# Patient Record
Sex: MALE | Race: WHITE | Employment: OTHER | URBAN - METROPOLITAN AREA
[De-identification: names, ages, dates, MRNs, and addresses within clinical notes are randomized per-mention and may not be internally consistent; named-entity substitution may affect disease eponyms.]

---

## 2019-01-23 ENCOUNTER — HOSPITAL ENCOUNTER (INPATIENT)
Age: 64
LOS: 5 days | Discharge: HOME | DRG: 061 | End: 2019-01-28
Attending: EMERGENCY MEDICINE | Admitting: INTERNAL MEDICINE
Payer: SUBSIDIZED

## 2019-01-23 ENCOUNTER — APPOINTMENT (OUTPATIENT)
Dept: GENERAL RADIOLOGY | Age: 64
DRG: 061 | End: 2019-01-23
Attending: EMERGENCY MEDICINE
Payer: SUBSIDIZED

## 2019-01-23 ENCOUNTER — APPOINTMENT (OUTPATIENT)
Dept: CT IMAGING | Age: 64
DRG: 061 | End: 2019-01-23
Attending: EMERGENCY MEDICINE
Payer: SUBSIDIZED

## 2019-01-23 DIAGNOSIS — I63.9 CEREBROVASCULAR ACCIDENT (CVA), UNSPECIFIED MECHANISM (HCC): Primary | ICD-10-CM

## 2019-01-23 DIAGNOSIS — I10 ESSENTIAL HYPERTENSION: ICD-10-CM

## 2019-01-23 DIAGNOSIS — I50.9 CONGESTIVE HEART FAILURE, UNSPECIFIED HF CHRONICITY, UNSPECIFIED HEART FAILURE TYPE (HCC): ICD-10-CM

## 2019-01-23 DIAGNOSIS — I63.10 CEREBROVASCULAR ACCIDENT (CVA) DUE TO EMBOLISM OF PRECEREBRAL ARTERY (HCC): ICD-10-CM

## 2019-01-23 DIAGNOSIS — E78.5 HYPERLIPIDEMIA, UNSPECIFIED HYPERLIPIDEMIA TYPE: ICD-10-CM

## 2019-01-23 DIAGNOSIS — I25.10 ATHEROSCLEROSIS OF NATIVE CORONARY ARTERY OF NATIVE HEART WITHOUT ANGINA PECTORIS: ICD-10-CM

## 2019-01-23 LAB
ALBUMIN SERPL-MCNC: 3.6 G/DL (ref 3.5–5)
ALBUMIN/GLOB SERPL: 0.9 {RATIO} (ref 1.1–2.2)
ALP SERPL-CCNC: 65 U/L (ref 45–117)
ALT SERPL-CCNC: 69 U/L (ref 12–78)
ANION GAP SERPL CALC-SCNC: 12 MMOL/L (ref 5–15)
APTT PPP: 27.2 SEC (ref 22.1–32)
AST SERPL-CCNC: 63 U/L (ref 15–37)
ATRIAL RATE: 68 BPM
BASOPHILS # BLD: 0.1 K/UL (ref 0–0.1)
BASOPHILS NFR BLD: 1 % (ref 0–1)
BILIRUB SERPL-MCNC: 0.4 MG/DL (ref 0.2–1)
BUN SERPL-MCNC: 17 MG/DL (ref 6–20)
BUN/CREAT SERPL: 15 (ref 12–20)
CALCIUM SERPL-MCNC: 8.9 MG/DL (ref 8.5–10.1)
CALCULATED P AXIS, ECG09: 27 DEGREES
CALCULATED R AXIS, ECG10: -22 DEGREES
CALCULATED T AXIS, ECG11: 112 DEGREES
CHLORIDE SERPL-SCNC: 107 MMOL/L (ref 97–108)
CHOLEST SERPL-MCNC: 191 MG/DL
CK MB CFR SERPL CALC: 1.7 % (ref 0–2.5)
CK MB CFR SERPL CALC: 4.7 % (ref 0–2.5)
CK MB CFR SERPL CALC: 5.6 % (ref 0–2.5)
CK MB SERPL-MCNC: 25.9 NG/ML (ref 5–25)
CK MB SERPL-MCNC: 30.5 NG/ML (ref 5–25)
CK MB SERPL-MCNC: 8.4 NG/ML (ref 5–25)
CK SERPL-CCNC: 485 U/L (ref 39–308)
CK SERPL-CCNC: 543 U/L (ref 39–308)
CK SERPL-CCNC: 554 U/L (ref 39–308)
CO2 SERPL-SCNC: 24 MMOL/L (ref 21–32)
COMMENT, HOLDF: NORMAL
CREAT SERPL-MCNC: 1.1 MG/DL (ref 0.7–1.3)
CRP SERPL-MCNC: 0.41 MG/DL (ref 0–0.6)
DIAGNOSIS, 93000: NORMAL
DIFFERENTIAL METHOD BLD: ABNORMAL
EOSINOPHIL # BLD: 0.2 K/UL (ref 0–0.4)
EOSINOPHIL NFR BLD: 2 % (ref 0–7)
ERYTHROCYTE [DISTWIDTH] IN BLOOD BY AUTOMATED COUNT: 12.7 % (ref 11.5–14.5)
ERYTHROCYTE [SEDIMENTATION RATE] IN BLOOD: 18 MM/HR (ref 0–20)
EST. AVERAGE GLUCOSE BLD GHB EST-MCNC: 154 MG/DL
GLOBULIN SER CALC-MCNC: 4.1 G/DL (ref 2–4)
GLUCOSE BLD STRIP.AUTO-MCNC: 149 MG/DL (ref 65–100)
GLUCOSE SERPL-MCNC: 166 MG/DL (ref 65–100)
HBA1C MFR BLD: 7 % (ref 4.2–6.3)
HCT VFR BLD AUTO: 46.8 % (ref 36.6–50.3)
HDLC SERPL-MCNC: 52 MG/DL
HDLC SERPL: 3.7 {RATIO} (ref 0–5)
HGB BLD-MCNC: 15.3 G/DL (ref 12.1–17)
IMM GRANULOCYTES # BLD AUTO: 0 K/UL (ref 0–0.04)
IMM GRANULOCYTES NFR BLD AUTO: 0 % (ref 0–0.5)
LDLC SERPL CALC-MCNC: 125 MG/DL (ref 0–100)
LIPID PROFILE,FLP: ABNORMAL
LYMPHOCYTES # BLD: 2.7 K/UL (ref 0.8–3.5)
LYMPHOCYTES NFR BLD: 39 % (ref 12–49)
MAGNESIUM SERPL-MCNC: 2 MG/DL (ref 1.6–2.4)
MCH RBC QN AUTO: 33.5 PG (ref 26–34)
MCHC RBC AUTO-ENTMCNC: 32.7 G/DL (ref 30–36.5)
MCV RBC AUTO: 102.4 FL (ref 80–99)
MONOCYTES # BLD: 0.7 K/UL (ref 0–1)
MONOCYTES NFR BLD: 10 % (ref 5–13)
NEUTS SEG # BLD: 3.2 K/UL (ref 1.8–8)
NEUTS SEG NFR BLD: 48 % (ref 32–75)
NRBC # BLD: 0 K/UL (ref 0–0.01)
NRBC BLD-RTO: 0 PER 100 WBC
P-R INTERVAL, ECG05: 190 MS
PHOSPHATE SERPL-MCNC: 2.6 MG/DL (ref 2.6–4.7)
PLATELET # BLD AUTO: 204 K/UL (ref 150–400)
PMV BLD AUTO: 10.7 FL (ref 8.9–12.9)
POTASSIUM SERPL-SCNC: 3.7 MMOL/L (ref 3.5–5.1)
PROT SERPL-MCNC: 7.7 G/DL (ref 6.4–8.2)
Q-T INTERVAL, ECG07: 394 MS
QRS DURATION, ECG06: 88 MS
QTC CALCULATION (BEZET), ECG08: 418 MS
RBC # BLD AUTO: 4.57 M/UL (ref 4.1–5.7)
SAMPLES BEING HELD,HOLD: NORMAL
SERVICE CMNT-IMP: ABNORMAL
SODIUM SERPL-SCNC: 143 MMOL/L (ref 136–145)
THERAPEUTIC RANGE,PTTT: NORMAL SECS (ref 58–77)
TRIGL SERPL-MCNC: 70 MG/DL (ref ?–150)
TROPONIN I SERPL-MCNC: 0.59 NG/ML
TROPONIN I SERPL-MCNC: 12.3 NG/ML
TROPONIN I SERPL-MCNC: 16.3 NG/ML
TROPONIN I SERPL-MCNC: 8.1 NG/ML
TSH SERPL DL<=0.05 MIU/L-ACNC: 0.74 UIU/ML (ref 0.36–3.74)
VENTRICULAR RATE, ECG03: 68 BPM
VLDLC SERPL CALC-MCNC: 14 MG/DL
WBC # BLD AUTO: 6.8 K/UL (ref 4.1–11.1)

## 2019-01-23 PROCEDURE — 96361 HYDRATE IV INFUSION ADD-ON: CPT

## 2019-01-23 PROCEDURE — 74011000250 HC RX REV CODE- 250: Performed by: EMERGENCY MEDICINE

## 2019-01-23 PROCEDURE — 74011000258 HC RX REV CODE- 258: Performed by: RADIOLOGY

## 2019-01-23 PROCEDURE — 84100 ASSAY OF PHOSPHORUS: CPT

## 2019-01-23 PROCEDURE — 96374 THER/PROPH/DIAG INJ IV PUSH: CPT

## 2019-01-23 PROCEDURE — 36415 COLL VENOUS BLD VENIPUNCTURE: CPT

## 2019-01-23 PROCEDURE — 82962 GLUCOSE BLOOD TEST: CPT

## 2019-01-23 PROCEDURE — 94761 N-INVAS EAR/PLS OXIMETRY MLT: CPT

## 2019-01-23 PROCEDURE — 3E03317 INTRODUCTION OF OTHER THROMBOLYTIC INTO PERIPHERAL VEIN, PERCUTANEOUS APPROACH: ICD-10-PCS | Performed by: EMERGENCY MEDICINE

## 2019-01-23 PROCEDURE — 74011250637 HC RX REV CODE- 250/637: Performed by: INTERNAL MEDICINE

## 2019-01-23 PROCEDURE — 86140 C-REACTIVE PROTEIN: CPT

## 2019-01-23 PROCEDURE — 74011250637 HC RX REV CODE- 250/637: Performed by: PHYSICIAN ASSISTANT

## 2019-01-23 PROCEDURE — 77030032490 HC SLV COMPR SCD KNE COVD -B

## 2019-01-23 PROCEDURE — 80053 COMPREHEN METABOLIC PANEL: CPT

## 2019-01-23 PROCEDURE — 71045 X-RAY EXAM CHEST 1 VIEW: CPT

## 2019-01-23 PROCEDURE — 83036 HEMOGLOBIN GLYCOSYLATED A1C: CPT

## 2019-01-23 PROCEDURE — 82550 ASSAY OF CK (CPK): CPT

## 2019-01-23 PROCEDURE — 74011636320 HC RX REV CODE- 636/320: Performed by: RADIOLOGY

## 2019-01-23 PROCEDURE — 99285 EMERGENCY DEPT VISIT HI MDM: CPT

## 2019-01-23 PROCEDURE — 65610000006 HC RM INTENSIVE CARE

## 2019-01-23 PROCEDURE — C8929 TTE W OR WO FOL WCON,DOPPLER: HCPCS

## 2019-01-23 PROCEDURE — 92610 EVALUATE SWALLOWING FUNCTION: CPT | Performed by: SPEECH-LANGUAGE PATHOLOGIST

## 2019-01-23 PROCEDURE — 80061 LIPID PANEL: CPT

## 2019-01-23 PROCEDURE — 85025 COMPLETE CBC W/AUTO DIFF WBC: CPT

## 2019-01-23 PROCEDURE — 74011000258 HC RX REV CODE- 258: Performed by: EMERGENCY MEDICINE

## 2019-01-23 PROCEDURE — 74011250636 HC RX REV CODE- 250/636: Performed by: INTERNAL MEDICINE

## 2019-01-23 PROCEDURE — 84484 ASSAY OF TROPONIN QUANT: CPT

## 2019-01-23 PROCEDURE — 83735 ASSAY OF MAGNESIUM: CPT

## 2019-01-23 PROCEDURE — 70450 CT HEAD/BRAIN W/O DYE: CPT

## 2019-01-23 PROCEDURE — 96375 TX/PRO/DX INJ NEW DRUG ADDON: CPT

## 2019-01-23 PROCEDURE — 74011250636 HC RX REV CODE- 250/636

## 2019-01-23 PROCEDURE — 93005 ELECTROCARDIOGRAM TRACING: CPT

## 2019-01-23 PROCEDURE — 84443 ASSAY THYROID STIM HORMONE: CPT

## 2019-01-23 PROCEDURE — 74011250636 HC RX REV CODE- 250/636: Performed by: PHYSICIAN ASSISTANT

## 2019-01-23 PROCEDURE — 0042T CT PERF W CBF: CPT

## 2019-01-23 PROCEDURE — 70496 CT ANGIOGRAPHY HEAD: CPT

## 2019-01-23 PROCEDURE — 85730 THROMBOPLASTIN TIME PARTIAL: CPT

## 2019-01-23 PROCEDURE — 85652 RBC SED RATE AUTOMATED: CPT

## 2019-01-23 PROCEDURE — 74011250636 HC RX REV CODE- 250/636: Performed by: EMERGENCY MEDICINE

## 2019-01-23 RX ORDER — LISINOPRIL 5 MG/1
5 TABLET ORAL DAILY
Status: DISCONTINUED | OUTPATIENT
Start: 2019-01-23 | End: 2019-01-24

## 2019-01-23 RX ORDER — SODIUM CHLORIDE 9 MG/ML
75 INJECTION, SOLUTION INTRAVENOUS CONTINUOUS
Status: DISCONTINUED | OUTPATIENT
Start: 2019-01-23 | End: 2019-01-25

## 2019-01-23 RX ORDER — CARVEDILOL 6.25 MG/1
6.25 TABLET ORAL 2 TIMES DAILY WITH MEALS
Status: DISCONTINUED | OUTPATIENT
Start: 2019-01-23 | End: 2019-01-28 | Stop reason: HOSPADM

## 2019-01-23 RX ORDER — ONDANSETRON 2 MG/ML
4 INJECTION INTRAMUSCULAR; INTRAVENOUS
Status: DISCONTINUED | OUTPATIENT
Start: 2019-01-23 | End: 2019-01-28 | Stop reason: HOSPADM

## 2019-01-23 RX ORDER — FUROSEMIDE 10 MG/ML
40 INJECTION INTRAMUSCULAR; INTRAVENOUS ONCE
Status: COMPLETED | OUTPATIENT
Start: 2019-01-23 | End: 2019-01-23

## 2019-01-23 RX ORDER — SODIUM CHLORIDE 0.9 % (FLUSH) 0.9 %
30 SYRINGE (ML) INJECTION
Status: DISPENSED | OUTPATIENT
Start: 2019-01-23 | End: 2019-01-23

## 2019-01-23 RX ORDER — LABETALOL HCL 20 MG/4 ML
10 SYRINGE (ML) INTRAVENOUS
Status: DISCONTINUED | OUTPATIENT
Start: 2019-01-23 | End: 2019-01-23 | Stop reason: SDUPTHER

## 2019-01-23 RX ORDER — BISACODYL 5 MG
5 TABLET, DELAYED RELEASE (ENTERIC COATED) ORAL DAILY PRN
Status: DISCONTINUED | OUTPATIENT
Start: 2019-01-23 | End: 2019-01-28 | Stop reason: HOSPADM

## 2019-01-23 RX ORDER — SODIUM CHLORIDE 0.9 % (FLUSH) 0.9 %
5-40 SYRINGE (ML) INJECTION EVERY 8 HOURS
Status: DISCONTINUED | OUTPATIENT
Start: 2019-01-23 | End: 2019-01-28 | Stop reason: HOSPADM

## 2019-01-23 RX ORDER — SODIUM CHLORIDE 0.9 % (FLUSH) 0.9 %
5-40 SYRINGE (ML) INJECTION AS NEEDED
Status: DISCONTINUED | OUTPATIENT
Start: 2019-01-23 | End: 2019-01-28 | Stop reason: HOSPADM

## 2019-01-23 RX ORDER — ACETAMINOPHEN 325 MG/1
650 TABLET ORAL
Status: DISCONTINUED | OUTPATIENT
Start: 2019-01-23 | End: 2019-01-28 | Stop reason: HOSPADM

## 2019-01-23 RX ORDER — SODIUM CHLORIDE 0.9 % (FLUSH) 0.9 %
5-40 SYRINGE (ML) INJECTION EVERY 8 HOURS
Status: DISCONTINUED | OUTPATIENT
Start: 2019-01-23 | End: 2019-01-26

## 2019-01-23 RX ORDER — LABETALOL HCL 20 MG/4 ML
SYRINGE (ML) INTRAVENOUS
Status: DISPENSED
Start: 2019-01-23 | End: 2019-01-23

## 2019-01-23 RX ORDER — SODIUM CHLORIDE 0.9 % (FLUSH) 0.9 %
10 SYRINGE (ML) INJECTION
Status: COMPLETED | OUTPATIENT
Start: 2019-01-23 | End: 2019-01-23

## 2019-01-23 RX ORDER — LABETALOL HCL 20 MG/4 ML
20 SYRINGE (ML) INTRAVENOUS
Status: COMPLETED | OUTPATIENT
Start: 2019-01-23 | End: 2019-01-23

## 2019-01-23 RX ORDER — ATORVASTATIN CALCIUM 40 MG/1
40 TABLET, FILM COATED ORAL
Status: DISCONTINUED | OUTPATIENT
Start: 2019-01-23 | End: 2019-01-28 | Stop reason: HOSPADM

## 2019-01-23 RX ORDER — LABETALOL HCL 20 MG/4 ML
10 SYRINGE (ML) INTRAVENOUS
Status: DISCONTINUED | OUTPATIENT
Start: 2019-01-23 | End: 2019-01-24

## 2019-01-23 RX ORDER — SODIUM CHLORIDE 9 MG/ML
50 INJECTION, SOLUTION INTRAVENOUS ONCE
Status: COMPLETED | OUTPATIENT
Start: 2019-01-23 | End: 2019-01-23

## 2019-01-23 RX ADMIN — SODIUM CHLORIDE 75 ML/HR: 900 INJECTION, SOLUTION INTRAVENOUS at 17:30

## 2019-01-23 RX ADMIN — CARVEDILOL 6.25 MG: 6.25 TABLET, FILM COATED ORAL at 18:30

## 2019-01-23 RX ADMIN — LABETALOL 20 MG/4 ML (5 MG/ML) INTRAVENOUS SYRINGE 10 MG: at 09:33

## 2019-01-23 RX ADMIN — SODIUM CHLORIDE 1.5 ML: 9 INJECTION INTRAMUSCULAR; INTRAVENOUS; SUBCUTANEOUS at 12:41

## 2019-01-23 RX ADMIN — Medication 10 ML: at 18:32

## 2019-01-23 RX ADMIN — Medication 10 ML: at 08:18

## 2019-01-23 RX ADMIN — ALTEPLASE 81 MG: KIT at 02:00

## 2019-01-23 RX ADMIN — LABETALOL 20 MG/4 ML (5 MG/ML) INTRAVENOUS SYRINGE 20 MG: at 01:51

## 2019-01-23 RX ADMIN — IOPAMIDOL 120 ML: 755 INJECTION, SOLUTION INTRAVENOUS at 02:36

## 2019-01-23 RX ADMIN — SODIUM CHLORIDE 50 ML: 900 INJECTION, SOLUTION INTRAVENOUS at 03:00

## 2019-01-23 RX ADMIN — LISINOPRIL 5 MG: 5 TABLET ORAL at 10:26

## 2019-01-23 RX ADMIN — CARVEDILOL 6.25 MG: 6.25 TABLET, FILM COATED ORAL at 10:26

## 2019-01-23 RX ADMIN — Medication 10 ML: at 02:36

## 2019-01-23 RX ADMIN — FUROSEMIDE 40 MG: 10 INJECTION, SOLUTION INTRAMUSCULAR; INTRAVENOUS at 10:26

## 2019-01-23 RX ADMIN — SODIUM CHLORIDE 100 ML: 900 INJECTION, SOLUTION INTRAVENOUS at 02:36

## 2019-01-23 RX ADMIN — SODIUM CHLORIDE 75 ML/HR: 900 INJECTION, SOLUTION INTRAVENOUS at 04:16

## 2019-01-23 RX ADMIN — ATORVASTATIN CALCIUM 40 MG: 40 TABLET, FILM COATED ORAL at 21:59

## 2019-01-23 NOTE — ED NOTES
0500 provided pt with pillow for comfort. VSS. No s/s of acute distress or bleeding. 0530 spouse leaving for the night. Pt resting comfortably on stretcher. Call bell within reach. 0600 pt resting with eyes closed. Continues to be on monitor x 3. No s/s of bleeding noted. No sign and held orders available to release at this time. 0630 pt continues to rest with eyes closed. VSS.    0700 pt awake. Pt continues to have clear speech. IV on RIGHT Forearm has some bruising above IV sight and appears to have elevation at the site of insertion. IV fluids paused and switched to LEFT hand. IV checked by a 2nd RN. IV line has flashback and is flushing well. 0730 pt resting comfortably on stretcher. VSS. Call bell within reach. Will continue to monitor.

## 2019-01-23 NOTE — CONSULTS
Cardiology Consult Note      Patient Name: Fernandez Sebastian  : 1955 MRN: [id number]  Date: 2019  Time: 9:19 AM    Admit Diagnosis: CVA (cerebral vascular accident) [facility])    Primary/Consulting Cardiologist: Cally Hicks M.D.    Galdino Jordan for Consult: Elevated Troponin    Requesting MD: Deleta Moritz, MD    HPI:  Fernandez Sebastian is a 58 y.o. male admitted on 2019  for CVA (cerebral vascular accident) [facility]). has a past medical history of CAD (coronary artery disease). Presented from home early this am for left sided weakness. LKW at 2130 yesterday. Awoke to use the restroom and noted his left side was flaccid as well as left facial droop. Ultimately received tPA in the ED, which has helped his symptoms to mostly completely resolve. He has a h/o CAD. STEMI in  and received PCI to LAD with JARED. Has disease in OM1 and OM2 at 60% and RCA at 80% at the time of cath. He was put on ASA, Plavix, Statin, ACE and BB. EF by echo was 50% at that time. Last seen in office in , he admits he stopped following up. He no longer takes any medicines as well. He did initially lose weight, about 120 pounds, following his stents, but has gained since. Last weigh in office was 247 pounds. He denies having any CP or SOB with activities. He is a , moves heavy loads, walks a lot on the job as well as climbs ladders and step. He denies any edema of the legs, abdomen and does not have early satiety. Subjective:  Denies CP, SOB or palpitations. Feels he has complete recovery of his left side. Speech is clear. Assessment and Plan     1. Acute CVA   - s/p tPA   - Neurology consulted   - ASA held  2. Troponin elevation   - 0.59, Primary team trending   - Asymptomatic   - EKG NSR - no evidence for ACS  2.  CAD   - s/p PCI with JARED to LAD   - residual dz - disease in OM1 and OM@ at 60% and RCA at 80%   - No longer on ASA, Plavix, BB, ACE or statin - he stopped about 1 year after stents   - Statin restarted   - Restart BB and ACE   - ASA held with tPA use   - Echo ordered   - Lipid panel pending  3. HTN   - BP elevated in ED   - BB and ACE  4. Body mass index is 41.55 kg/m². - Morbid obesity - discussed weight loss and dietary changes  5. Abnormal CXR   - Cardiomegaly   - mild edema pattern   - edema of legs as well   - Give Lasix IV x 1    Acute CVA on presentation. tPA given and successfully stopped CVA. Left sided weakness and facial droop now absent. He feels back to \"normal\"  Troponin 0.59 and trending. H/o CAD with PCI to LAD and noted residual disease. Non compliant with meds. Echo ordered as well as lipid profile. Will start meds for HTN and follow up all other labs. Will follow up echo results. Further cardiac testing pending echo results. Cardiology attending: seen and examined. Agree with assess and plan  Has not seen doc since I saw him in 2012 and on no meds. No cardiac symptoms in spite of trop and ekg unchanged from 2012. Chest clear, cv rrr no murmur, ext 1+. Await echo results, but most likely will need cath to sort this out. Resume usual meds for htn and cad.         Review of Systems:     GENERAL   Recent weight loss - no   Fever -----------------   no   Chills -----------------   no     EYES, VISION   Visual Changes - no     EARS, NOSE, THROAT   Hearing loss ----------- no   Swallowing difficulties - no     CARDIOVASCULAR   Chest pain/pressure ---- no   Arrhythmia/palpitations - no       RESPIRATORY   Cough ------------------ no   Shortness of breath - no   Wheezing -------------- no   GASTROINTESTINAL   Abdominal pain - no   Heartburn -------- no   Bloody stool ----- no     GENITOURINARY   Frequent urination - no   Urgency -------------- no     MUSCULOSKELETAL   Joint pain/swelling ---- no   Musculoskeletal pain - no     SKIN & INTEGUMENTARY   Rashes - no   Sores --- no         NEUROLOGICAL Numbness/tingling - no   Sensation loss ------ no     PSYCHIATRIC   Nervousness/anxiety - no   Depression -------------- no     ENDOCRINE   Heat/cold intolerance - no   Excessive thirst -------- no     HEMATOLOGIC/LYMPHATIC   Abnormal bleeding - no     ALL/IMMUN   Allergic reaction ------ no   Recurrent infections - no     Previous treatment/evaluation includes Percutaneous Coronary Intervention, echocardiogram and cardiac catheterization . Cardiac risk factors: dyslipidemia, obesity, male gender, hypertension. Past Medical History:   Diagnosis Date    CAD (coronary artery disease)     stent, MI 4-10-12     Past Surgical History:   Procedure Laterality Date    CARDIAC SURG PROCEDURE UNLIST      cath with stents     Current Facility-Administered Medications   Medication Dose Route Frequency    sodium chloride (NS) flush 5-40 mL  5-40 mL IntraVENous Q8H    sodium chloride (NS) flush 5-40 mL  5-40 mL IntraVENous PRN    labetalol (NORMODYNE;TRANDATE) 20 mg/4 mL (5 mg/mL) injection 10 mg  10 mg IntraVENous Q10MIN PRN    niCARdipine (CARDENE) 25 mg in 0.9% sodium chloride 250 mL infusion  0-15 mg/hr IntraVENous TITRATE    0.9% sodium chloride infusion  75 mL/hr IntraVENous CONTINUOUS    sodium chloride (NS) flush 5-40 mL  5-40 mL IntraVENous Q8H    sodium chloride (NS) flush 5-40 mL  5-40 mL IntraVENous PRN    labetalol (NORMODYNE;TRANDATE) injection 10 mg  10 mg IntraVENous Q10MIN PRN    acetaminophen (TYLENOL) tablet 650 mg  650 mg Oral Q6H PRN    bisacodyl (DULCOLAX) tablet 5 mg  5 mg Oral DAILY PRN    atorvastatin (LIPITOR) tablet 40 mg  40 mg Oral QHS     No current outpatient medications on file. No Known Allergies   History reviewed. No pertinent family history.    Social History     Socioeconomic History    Marital status:      Spouse name: Not on file    Number of children: Not on file    Years of education: Not on file    Highest education level: Not on file   Tobacco Use    Smoking status: Never Smoker    Smokeless tobacco: Never Used   Substance and Sexual Activity    Alcohol use: Yes     Comment: Weekly    Drug use: No    Sexual activity: Yes       Objective:    Physical Exam    Vitals:   Vitals:    01/23/19 0730 01/23/19 0800 01/23/19 0830 01/23/19 0900   BP: 145/82 145/89 143/88 170/89   Pulse: 64 68 66 77   Resp: 16 16 16 18   Temp:  97.9 °F (36.6 °C) 97.9 °F (36.6 °C)    SpO2: 95% 98% 99% 99%   Weight:           General:    Alert, cooperative, no distress, appears stated age. Neck:   Supple, no carotid bruit and no JVD. Back:     Symmetric, normal curvature. Lungs:     Coarse BS to auscultation bilaterally. Heart[de-identified]    Regular rate and rhythm, S1, S2 normal, no murmur, click, rub or gallop. Abdomen:     Soft, non-tender. Bowel sounds normal.    Extremities:   Extremities normal, atraumatic, no cyanosis. 1 + edema. Vascular:   Pulses - 2+ radials   Skin:   Skin color normal. No rashes or lesions   Neurologic:   CN II-XII grossly intact. Telemetry: normal sinus rhythm    ECG:   EKG Results     Procedure 720 Value Units Date/Time    EKG, 12 LEAD, INITIAL [[id number]] Collected:  01/23/19 0255    Order Status:  Completed Updated:  01/23/19 0258     Ventricular Rate 68 BPM      Atrial Rate 68 BPM      P-R Interval 190 ms      QRS Duration 88 ms      Q-T Interval 394 ms      QTC Calculation (Bezet) 418 ms      Calculated P Axis 27 degrees      Calculated R Axis -22 degrees      Calculated T Axis 112 degrees      Diagnosis --     Normal sinus rhythm  Inferior infarct , age undetermined  Anterolateral infarct , age undetermined  When compared with ECG of 10-APR-2012 04:47,  Sinus rhythm has replaced Idioventricular rhythm  Vent.  rate has decreased BY  35 BPM              Data Review:     Radiology:   XR Results (most recent):  Results from Hospital Encounter encounter on 01/23/19   XR CHEST PORT    Narrative EXAM:  XR CHEST PORT    INDICATION: Chest pain    COMPARISON: 4/10/2012    TECHNIQUE: Portable AP semierect] chest view at 0237 hours    FINDINGS: Cardiac monitoring wires overlie the thorax. There is increased  moderate to marked cardiac silhouette enlargement. There is central pulmonary  vascular congestion     There are mild diffuse interstitial opacities. There is no pleural effusion or  pneumothorax. The bones and upper abdomen are stable. Impression IMPRESSION:    Increased moderate to marked cardiac silhouette enlargement, which may represent  cardiomegaly and/or pericardial effusion. Findings of pulmonary edema. Recent Labs     01/23/19  0140   *   TROIQ 0.59*     Recent Labs     01/23/19 0140      K 3.7      CO2 24   BUN 17   CREA 1.10   *   PHOS 2.6   CA 8.9     Recent Labs     01/23/19 0140   WBC 6.8   HGB 15.3   HCT 46.8        Recent Labs     01/23/19 0140   SGOT 63*   AP 65     No results for input(s): CHOL, LDLC in the last 72 hours. No lab exists for component: TGL, HDLC,  HBA1C  No results for input(s): CRP, TSH, TSHEXT in the last 72 hours. No lab exists for component: ESR    Mary Gonsalves. Blair Alaniz M.D.          [facility] [address] [place] [address] Rd[address] [facility] [address] [facility] [address]     [place] [address]     [phone]    CC:None

## 2019-01-23 NOTE — ED NOTES
Pt was able to stand at side of bed with minimal assistance and void in urinal. Pt returned to bed independently with no difficulties.

## 2019-01-23 NOTE — PROGRESS NOTES
NEUROINTERVENTIONAL SURGERY ATTENDING ON CALL NOTE    Asked by Dr. Vero Ramirez to review imaging on this 57 yo patient brought to the ED with sudden onset of left hemiplegia and facial droop early this morning. CT head was negative for blood or hyperdense MCA sign. Pt received tPA and returned to scanner for CTA/CTP. I personally reviewed all of the patient's neuroimaging. CT head shows no acute process, ASPECTS score of 10. Calcifications in both siphons and left vertebral artery. CTA shows no significant bifurcation disease. No large vessel occlusion identified. Reconstructed MIPs show a small right middle division branch occlusion with flow distally from collateral circulation. No other emboli identified. CTP shows delayed MTT in the middle division territory of the temporal lobe with no significant blood flow/blood volume mismatch. Pt was improving in the ED after tPA administration. No need for thrombectomy. Pt for routine post tPA care per neurology. Andrey Prieto M.D.   Attending, NIS

## 2019-01-23 NOTE — PROGRESS NOTES
Admission Medication Reconciliation:    Information obtained from:  patient    Comments/Recommendations:     Spoke with patient regarding Akilah Choi's medications. He states that he does not take any medications prior to admission. No changes have been made to the PTA medication list.    Allergies and reactions were verified with the patient. Allergies:  Patient has no known allergies. Chief Complaint for this Admission:    Chief Complaint   Patient presents with    Extremity Weakness       Significant PMH/Disease States:   Past Medical History:   Diagnosis Date    CAD (coronary artery disease)     stent, MI 4-10-12       Prior to Admission Medications:   None       Thank you for allowing me to participate in this patient's care. Please call the main pharmacy at  or the med [facility] pharmacist at  with any questions.     Keith Kenny, LeahD

## 2019-01-23 NOTE — PROGRESS NOTES
CM attempted to perform initial assessment. Pt currently using restroom. CM to follow-up with pt as able.     Rosanna Pak RN, BSN  Care Management Department

## 2019-01-23 NOTE — ED PROVIDER NOTES
58 y.o. male with past medical history significant for CAD who presents from home with chief complaint of left sided weakness. Pt states he went to bed x2130 last night, Last known well. He reports awaking from sleep x40 minutes ago to use the restroom when he noticed his left side was flaccid. He was able to walk to the bathroom and return to the bed, his wife then noticed a left sided facial droop noted. He denies any headache or pain at this time. Pt denies any hx of stroke. There are no other acute medical concerns at this time. PSHx: Significant for cardiac catheterization   Social Hx: denies tobacco use, denies EtOH use, denies Illicit Drug use    PCP: None    Note written by Barbara Steward, as dictated by Coleman Galvan MD 1:20 AM        The history is provided by the patient. No  was used. Past Medical History:   Diagnosis Date    CAD (coronary artery disease)     stent, MI 4-10-12       Past Surgical History:   Procedure Laterality Date    CARDIAC SURG PROCEDURE UNLIST      cath with stents         History reviewed. No pertinent family history.     Social History     Socioeconomic History    Marital status:      Spouse name: Not on file    Number of children: Not on file    Years of education: Not on file    Highest education level: Not on file   Social Needs    Financial resource strain: Not on file    Food insecurity - worry: Not on file    Food insecurity - inability: Not on file    Transportation needs - medical: Not on file   [employer] needs - non-medical: Not on file   Occupational History    Not on file   Tobacco Use    Smoking status: Never Smoker    Smokeless tobacco: Never Used   Substance and Sexual Activity    Alcohol use: Yes     Comment: Weekly    Drug use: No    Sexual activity: Yes   Other Topics Concern    Not on file   Social History Narrative    Not on file         ALLERGIES: Patient has no known allergies. Review of Systems   HENT:        + left facial droop   Musculoskeletal: Negative for myalgias. Neurological: Positive for weakness (L sided). Negative for headaches. All other systems reviewed and are negative. Vitals:    01/23/19 0141 01/23/19 0153 01/23/19 0158 01/23/19 0200   BP: 185/81 178/74 178/74 160/82   Pulse: 97 89 92 91   Resp: 17 23 26   SpO2: 93% 90% 93% 92%   Weight:                Physical Exam   Nursing note and vitals reviewed. CONSTITUTIONAL: Well-appearing; well-nourished; in mild distress  HEAD: Normocephalic; atraumatic  EYES: PERRL; EOM intact; conjunctiva and sclera are clear bilaterally. ENT: No rhinorrhea; normal pharynx with no tonsillar hypertrophy; mucous membranes pink/moist, no erythema, no exudate. NECK: Supple; non-tender; no cervical lymphadenopathy  CARD: Normal S1, S2; no murmurs, rubs, or gallops. Regular rate and rhythm. RESP: Normal respiratory effort; breath sounds clear and equal bilaterally; no wheezes, rhonchi, or rales. ABD: Normal bowel sounds; non-distended; non-tender; no palpable organomegaly, no masses, no bruits. Back Exam: Normal inspection; no vertebral point tenderness, no CVA tenderness. Normal range of motion. EXT: Normal ROM in all four extremities; non-tender to palpation; no swelling or deformity; distal pulses are normal, no edema. SKIN: Warm; dry; no rash. NEURO:Alert and oriented x 3, Excessive aphasia with left facial droop; left upper and lower extremity weakness; decreased reflexes on the left side; left pronator drift; gaze deviation to the right side     MDM  Number of Diagnoses or Management Options  Cerebrovascular accident (CVA), unspecified mechanism [facility]):   Diagnosis management comments: Assessment: 77-year-old male, who presents with acute CVA and VAN positive for large vessel occlusion. The patient appears mild to moderately hypertensive. He is in the window for thrombolytic therapy. The [facility] is 12.      Plan: CT scan of the head, followed by CTA of the head and neck and CT perfusion/ consult Teleneurology/ thrombolytic therapy/ Consult NIS/ consult hospitalist/ Monitor and Reevaluate. Amount and/or Complexity of Data Reviewed  Clinical lab tests: ordered and reviewed  Tests in the radiology section of CPT®: ordered and reviewed  Tests in the medicine section of CPT®: reviewed and ordered  Discussion of test results with the performing providers: yes  Decide to obtain previous medical records or to obtain history from someone other than the patient: yes  Obtain history from someone other than the patient: yes  Review and summarize past medical records: yes  Discuss the patient with other providers: yes  Independent visualization of images, tracings, or specimens: yes    Risk of Complications, Morbidity, and/or Mortality  Presenting problems: moderate  Diagnostic procedures: moderate  Management options: moderate    Critical Care  Total time providing critical care: (Total critical care time spent exclusive of procedures: 60 minutes)    Patient Progress  Patient progress: stable         Procedures     ED EKG interpretation:  Rhythm: normal sinus rhythm; and regular . Rate (approx.): 68; Axis: left axis deviation; P wave: normal; QRS interval: normal ; ST/T wave: non-specific changes; in  Lead: Diffusely; Other findings: abnormal ekg. This EKG was interpreted by Surjit Barraza MD,ED Provider. XRAY INTERPRETATION (ED MD)  Chest Xray  No acute process seen. Normal heart size. No bony abnormalities. No infiltrate. Tita Hanley MD 4:07 AM      PROGRESS NOTE:  1:22 AM  Pt in Ct. PROGRESS NOTE:  1:26 AM  Paged neurology. CONSULT NOTE:  1:30 AM Tita Hanley MD spoke with Dr. Anjali Diggs, Consult for Neurology. Discussed available diagnostic tests and clinical findings. Dr. Anjali Diggs will evaluate. PROGRESS NOTE:  1:50 AM  Dr. Anjali Diggs evaluating the pt.     CONSULT NOTE:  2:20 AM Tita Hanley MD spoke with Dr. Luis Cannon, Consult for NIS. Discussed available diagnostic tests and clinical findings. Dr. Luis Cannon will review results of the pt's CTA. PROGRESS NOTE:    Pt has been reexamined by Ankita Bowman MD all available results have been reviewed with pt and any available family. The patient is not a candidate for an embolectomy. His symptoms are rapidly improving after the administration of TPA. Will consult hospitalist for evaluation and admission. Pt understands sx, dx, and tx in ED. Care plan has been outlined and questions have been answered. Pt and any available family understands and agrees to need for admission to hospital for further tx not available in ED. Pt is ready for admission. Written by Alphonso Triplett MD,  3:09 AM    .   Hospitalist Ray for Admission  3:09 AM    ED Room Number: DE68/97  Patient Name and age:  Gen Escoto 58 y.o.  male  Working Diagnosis:   1. Cerebrovascular accident (CVA), unspecified mechanism ([address]      Readmission: no  Isolation Requirements:  no  Recommended Level of Care:  ICU  Code Status:  Full  Other:  Pt was given TPA. CONSULT NOTE:  Ankita Bowman MD spoke with Dr. Letitia Friend of the adult hospitalist team. Discussed patient's presentation, history, physical assessment, and available diagnostic results.  He will evaluate, write orders and admit the patient to the hospital. 3:15 AM

## 2019-01-23 NOTE — PROGRESS NOTES
A 79-year-old man with a past medical history significant for coronary artery disease status post stent placement in 2012, hypertension, dyslipidemia, morbid obesity was admitted for sudden onset of left-sided facial droop. Acute CVA s/p tPA  - MRI pending  - neurology consult    Elevated troponin   - Trop increased from 0.59 to 16.30  - pt denied chest pain  - cardiology on board  - discussed with Dr. Corinna Cartwright- possible cardiac cath tomorrow.   - echo pending

## 2019-01-23 NOTE — ED NOTES
Bedside and Verbal shift change report given to Naveed Luna RN (oncoming nurse) by Lyubov Mendoza RN (offgoing nurse). Report included the following information SBAR, ED Summary, MAR and Recent Results.

## 2019-01-23 NOTE — ED TRIAGE NOTES
Gertr[place] 2130, woke from sleep to use restroom, noted LEFT sided facial droop, then while attempting to walk to restroom, became unable to move LEFT arm or leg.

## 2019-01-23 NOTE — ED NOTES
Pt tolerated dysphagia screening well    Spouse requesting lab/imaging results. Dr. Shailesh Oates notified.

## 2019-01-23 NOTE — PROGRESS NOTES
Speech Pathology bedside swallow evaluation/discharge  Patient: Ashly Davies (4 y.o. male)  Date: 1/23/2019  Primary Diagnosis: CVA (cerebral vascular accident) [facility])       Precautions:        ASSESSMENT :  Based on the objective data described below, the patient presents with intact oropharyngeal swallow. Patient with mild left facial droop however no functional speech or swallowing deficits. Patient tolerated all PO trials without overt s/s aspiration. Patient speaking intelligibly at full sentence level in conversation. .  Skilled acute therapy provided by a speech-language pathologist is not indicated at this time. PLAN :  Recommendations:  Regular diet  Thin liquids  No follow up SLP treatment warranted  Discharge Recommendations: None     SUBJECTIVE:   Patient stated I have a swallowing problem. I swallow too much. Patient reports no difficulty swallowing. RN approved visit. OBJECTIVE:     Past Medical History:   Diagnosis Date    CAD (coronary artery disease)     stent, MI 4-10-12     Past Surgical History:   Procedure Laterality Date    CARDIAC SURG PROCEDURE UNLIST      cath with stents     Prior Level of Function/Home Situation:      Diet prior to admission: Regular  Current Diet:  NPO   Cognitive and Communication Status:  Neurologic State: Alert  Orientation Level: Oriented X4  Cognition: Appropriate for age attention/concentration, Follows commands  Perception: Appears intact  Perseveration: No perseveration noted  Safety/Judgement: Awareness of environment, Insight into deficits  Oral Assessment:  Oral Assessment  Labial: Left droop  Dentition: Intact  Oral Hygiene: Moist oral mucosa  Lingual: No impairment  Velum: Unable to visualize  Mandible: No impairment  P.O. Trials:  Patient Position: Upright in bed  Vocal quality prior to P.O.: No impairment  Consistency Presented: Ice chips; Thin liquid;Puree; Solid  How Presented: Self-fed/presented;Straw;Spoon; Successive swallows Bolus Acceptance: No impairment  Bolus Formation/Control: No impairment     Propulsion: No impairment  Oral Residue: None  Initiation of Swallow: No impairment  Laryngeal Elevation: Functional  Aspiration Signs/Symptoms: None  Pharyngeal Phase Characteristics: No impairment, issues, or problems              Oral Phase Severity: No impairment  Pharyngeal Phase Severity : No impairment  NOMS:   The NOMS functional outcome measure was used to quantify this patient's level of swallowing impairment. Based on the NOMS, the patient was determined to be at level 7 for swallow function     NOMS Swallowing Levels:  Level 1 (CN): NPO  Level 2 (CM): NPO but takes consistency in therapy  Level 3 (CL): Takes less than 50% of nutrition p.o. and continues with nonoral feedings; and/or safe with mod cues; and/or max diet restriction  Level 4 (CK): Safe swallow but needs mod cues; and/or mod diet restriction; and/or still requires some nonoral feeding/supplements  Level 5 (CJ): Safe swallow with min diet restriction; and/or needs min cues  Level 6 (CI): Independent with p.o.; rare cues; usually self cues; may need to avoid some foods or needs extra time  Level 7 ([address] [place] [address]): Independent for all p.o.  GARO. (2003). National Outcomes Measurement System (NOMS): Adult Speech-Language Pathology User's Guide. Pain:  Pain Scale 1: Numeric (0 - 10)  Pain Intensity 1: 0     After treatment:   [] Patient left in no apparent distress sitting up in chair  [x] Patient left in no apparent distress in bed  [x] Call bell left within reach  [x] Nursing notified  [x] Caregiver present  [] Bed alarm activated    COMMUNICATION/EDUCATION:   The patients plan of care including findings, recommendations, and recommended diet changes were discussed with: Registered Nurse. Patient was educated regarding role of SLP, POC. [] Posted safety precautions in patient's room.   [x] Patient/family have participated as able and agree with findings and recommendations. [] Patient is unable to participate in plan of care at this time.     Thank you for this referral.  Мария Pardo, SLP  Time Calculation: 15 mins

## 2019-01-23 NOTE — PROGRESS NOTES
Physical Therapy:    Orders received and chart reviewed. Pt admitted with acute CVA and received TPA in the ER this morning. Will hold therapy until 24 hours post TPA administration prior to mobilization. Will follow up tomorrow.  Thank you    Bird Becerra, PT, DPT

## 2019-01-23 NOTE — ED NOTES
0141 Pt back in ED room. Dr. Ivonne Newby, teleneurology, examining pt. [id number] noted by spouse. Pharmacy at the bedside. [id number] teleneurology and Dr. Moy Lowry speaking with pt and spouse regarding alteplase administration. Pt placed on 2L via NC due to OSats dropping to 89% on RA. OSats 94% on 2L O2    0159 Alteplase bolus given. 0200 Alteplase drip started. 0215 Pt to CT for CTA  Via stretcher with 2 RNs and alteplase infusing. No s/s of bleeding noted. Pt tolerating infusion well. 0245 speech appears to be a little bit more clearer at this time. Pt is able to raise left arm a little higher as well. Pt requesting ice chips. Pt provided with mouth swab for oral care. Spouse continues to be at the bedside. 0300 Alteplase infusion complete. NS running at 81 ml/hr to clear alteplase. Pt tolerated infusion well. RN remained a the bedside throughout the infusion.

## 2019-01-23 NOTE — PROGRESS NOTES
Occupational Therapy [id number] -   01.23.2019     Orders received and chart reviewed. Patient admitted with acute CVA and received TPA in the ER this morning (~0200AM). Will hold therapy until 24 hours post TPA administration prior to mobilization. Will follow up tomorrow. Thank you. Dakota Katz MS, OTR/L

## 2019-01-23 NOTE — PROGRESS NOTES
TRANSFER - IN REPORT:    Verbal report received from guillermo(name) on [employer]  being received from er(unit) for routine progression of care      Report consisted of patients Situation, Background, Assessment and   Recommendations(SBAR). Information from the following report(s) SBAR, Kardex and MAR was reviewed with the receiving nurse. Opportunity for questions and clarification was provided. Assessment completed upon patients arrival to unit and care assumed.

## 2019-01-23 NOTE — ED NOTES
0327 Pt will not be going to IR as he is not a candidate per Dr. Natividad Riggs, who reviewed CTA imaging. Dr. Tatiana Matos had called for an update and was notified of this. Maninder Anglin at the bedside. Maintenance fluids hung. No s/s of bleeding noted. 0430 pt talking with spouse. Pt does not appear to be in any distress at this time.

## 2019-01-23 NOTE — H&P
[address] HISTORY AND PHYSICAL Boris Drummond 
MR#: [id number] : 1955 ACCOUNT #: [de-identified] ADMIT DATE: 2019 PRIMARY CARE PHYSICIAN:  None. SOURCE OF INFORMATION:  The patient and the patient's spouse, who was present at the bedside. CHIEF COMPLAINT:  Left-sided facial droop. HISTORY OF PRESENT ILLNESS:  This is a 59-year-old man with a past medical history significant for coronary artery disease status post stent placement, hypertension, dyslipidemia, morbid obesity who was in his usual state of health until the day of presentation at the emergency room when the patient developed sudden onset of left-sided facial droop. It was reported that the patient was last known well at about 9:30 yesterday evening. The patient went to bed around that time. Forty minutes later, the patient woke up to use the bathroom and realized that the left side of his body was weak. He called his wife on the phone who was in another room sleeping, and the wife came to the room and found that the patient has left facial droop in addition to the left-sided weakness. Patient was brought to the emergency room for further evaluation. When the patient arrived at the emergency room, a code stroke was called. CT of the head without contrast was obtained. The emergency room physician consulted neurologist through the tele neurology service. CTA of the head and neck with a perfusion study was obtained. It was determined that the patient was a candidate for tPA. Patient received tPA in the emergency room. The left facial droop improved as well as the left-sided weakness. Patient was referred to the hospitalist service for evaluation for admission. He was last admitted to this hospital from 04/10/2012 to 2012. He was admitted to the cardiology service for evaluation and treatment of anterior myocardial infarction.   He underwent cardiac catheterization with subsequent LAD stent placement. The patient was discharged home on cardiac medication. The patient stated that he took the medication for a while, but after some time, the medications were interfering with his ability to go to work and stopped the medication and tried alternative natural remedies. He also stated that he has lost a significant amount of weight through lifestyle modification. The patient stopped the medication over the objection of his primary care provider at that time. The patient denies fever, rigors, and chills. His troponin was elevated on arrival at the emergency room, but the patient denies any chest pain. PAST MEDICAL HISTORY:  Coronary artery disease status post stent placement, morbid obesity, hypertension, dyslipidemia. ALLERGIES:  NO KNOWN DRUG ALLERGIES. MEDICATIONS:  The patient is not taking any medication at present, but in 2012, he was discharged home on the following medications:  Aspirin 81 mg daily, Plavix 75 mg daily, lisinopril 5 mg daily, Zocor 40 mg daily, Lopressor 25 mg twice daily. FAMILY HISTORY:  This was reviewed. The patient's mother has Alzheimer's. No family history of stroke or TIA. PAST SURGICAL HISTORY:  This is significant for cardiac stent placement. SOCIAL HISTORY:  No history of tobacco abuse. Patient admits to social consumption of alcohol. REVIEW OF SYSTEMS:   
HEAD, EYES, EARS, NOSE, AND THROAT:  No headache, no dizziness, no blurring of vision, no photophobia. RESPIRATORY SYSTEM:  No cough, no shortness of breath, no hemoptysis. CARDIOVASCULAR SYSTEM:  No chest pain, no orthopnea, no palpitation. GASTROINTESTINAL SYSTEM:  No nausea and vomiting, no diarrhea, no constipation. GENITOURINARY SYSTEM:  No dysuria, no urgency, and no frequency. All other systems are reviewed, and they are negative. PHYSICAL EXAMINATION:   
GENERAL APPEARANCE:  The patient appeared ill, in moderate distress. VITAL SIGNS:  On arrival at the emergency room, temperature 98.2, pulse 97, respiratory rate 17, blood pressure 185/81, oxygen saturation 93% on room air. HEAD:  Normocephalic, atraumatic. EYES:  Normal eye movement. No redness, no drainage, no discharge. EARS:  Normal external ears with no obvious drainage. NOSE:  No deformity and no drainage. MOUTH AND THROAT:  No visible oral lesion. NECK:  Supple. No JVD, no thyromegaly. CHEST:  Clear breath sounds. No wheezing, no crackles. HEART:  Normal S1 and S2.  Regular. No clinically appreciable murmur. ABDOMEN:  Soft, obese, nontender, normal bowel sounds. CENTRAL NERVOUS SYSTEM:  Alert, oriented x3. Mild left facial droop noted, but patient is able to move all extremities. EXTREMITIES:  No edema. Pulses 2+ bilaterally. MUSCULOSKELETAL SYSTEM:  No obvious joint deformity and swelling. SKIN:  No active skin lesions seen in the exposed part of the body. PSYCHIATRIC:  Normal mood and affect. LYMPHATIC SYSTEM:  No cervical lymphadenopathy. DIAGNOSTIC DATA:  DATA:  Chest x-ray shows increased moderate to marked cardiac enlargement which may represent cardiomegaly and/or pericardial effusion and findings of pulmonary edema. CT scan of the head without contrast:  No acute intracranial abnormalities. CTA of the head and neck with the perfusion study shows a probable occlusion of a distal branch of the right MCA associated with a small area of abnormal perfusion in the right parietotemporal region. LABORATORY DATA:  Hematology:  WBC 6.8, hemoglobin at 15.3, hematocrit 46.8, platelet at 502. Coagulation profile:  PTT 27.2. Phosphorus level 2.6, magnesium 2.0.   Chemistry:  Sodium 143, potassium 3.7, chloride at 107, CO2 of 24, glucose 166, BUN 17, creatinine 1.10, calcium 8.9, total bilirubin 0.4, ALT 69, AST 63, alkaline phosphatase 65, total protein at 7.7, albumin level 3.6, globulin at 4.1. Cardiac profile:  Troponin 0.59. CK-MB 8.4. ASSESSMENT:   
1. Acute cerebrovascular accident. 2.  Coronary artery disease status post stent placement. 3.  Morbid obesity, unspecified. 4.  Hypertension. 5.  Dyslipidemia. 6.  Elevated troponin level. 7.  Hyperglycemia. PLAN:   
1. Acute cerebrovascular accident. Will admit the patient for further evaluation and treatment. Patient received tPA in the emergency room. Will admit the patient into the intensive care unit. Will obtain the MRI of the brain, echocardiogram.  Inpatient neurology consult will be requested to assist in further evaluation and treatment. Will check a lipid profile, hemoglobin A1c level. Will request a PT, OT evaluation and treatment as well as a speech therapy evaluation and treatment. Will await further recommendation from the inpatient neurologist.  Aspirin on hold for 24 hours as per post-tPA treatment protocol. 2.  Coronary artery disease status post stent placement. Will obtain serial cardiac markers. Patient is chest pain free. Will start the patient on Lopressor and lisinopril. Aspirin is on hold because of the tPA administration. 3.  Morbid obesity, unspecified. Dietary consult will be requested for dietary counseling. Will check a TSH level. 4.  Hypertension. Will place the patient on lisinopril and Lopressor as stated above. 5.  Dyslipidemia. Will resume Zocor and check lipid profile. 6.  Elevated troponin level. Patient denies chest pain. Will trend troponin level. Cardiology consult will be requested to assist in further evaluation and treatment. Will also await result of echocardiogram.   
7.  Hyperglycemia. Will check hemoglobin A1c level. Patient denies a history of diabetes. OTHER ISSUES:  CODE STATUS:  PATIENT IS A FULL CODE. Will request SCD for DVT prophylaxis.  
 
 
Mike Funez MD 
 
  
RE/SN 
 D: 01/23/2019 05:06    
T: 01/23/2019 06:07 JOB #: [id number]

## 2019-01-24 ENCOUNTER — APPOINTMENT (OUTPATIENT)
Dept: MRI IMAGING | Age: 64
DRG: 061 | End: 2019-01-24
Attending: INTERNAL MEDICINE
Payer: SUBSIDIZED

## 2019-01-24 LAB
ANION GAP SERPL CALC-SCNC: 8 MMOL/L (ref 5–15)
APTT PPP: 26.7 SEC (ref 22.1–32)
BUN SERPL-MCNC: 17 MG/DL (ref 6–20)
BUN/CREAT SERPL: 18 (ref 12–20)
CALCIUM SERPL-MCNC: 8.6 MG/DL (ref 8.5–10.1)
CHLORIDE SERPL-SCNC: 106 MMOL/L (ref 97–108)
CO2 SERPL-SCNC: 27 MMOL/L (ref 21–32)
CREAT SERPL-MCNC: 0.95 MG/DL (ref 0.7–1.3)
ERYTHROCYTE [DISTWIDTH] IN BLOOD BY AUTOMATED COUNT: 13.2 % (ref 11.5–14.5)
ERYTHROCYTE [DISTWIDTH] IN BLOOD BY AUTOMATED COUNT: 13.2 % (ref 11.5–14.5)
GLUCOSE SERPL-MCNC: 151 MG/DL (ref 65–100)
HCT VFR BLD AUTO: 45.7 % (ref 36.6–50.3)
HCT VFR BLD AUTO: 46.8 % (ref 36.6–50.3)
HGB BLD-MCNC: 14.8 G/DL (ref 12.1–17)
HGB BLD-MCNC: 15 G/DL (ref 12.1–17)
INR PPP: 1.1 (ref 0.9–1.1)
MCH RBC QN AUTO: 33.3 PG (ref 26–34)
MCH RBC QN AUTO: 33.7 PG (ref 26–34)
MCHC RBC AUTO-ENTMCNC: 32.1 G/DL (ref 30–36.5)
MCHC RBC AUTO-ENTMCNC: 32.4 G/DL (ref 30–36.5)
MCV RBC AUTO: 103.8 FL (ref 80–99)
MCV RBC AUTO: 104.1 FL (ref 80–99)
NRBC # BLD: 0 K/UL (ref 0–0.01)
NRBC # BLD: 0 K/UL (ref 0–0.01)
NRBC BLD-RTO: 0 PER 100 WBC
NRBC BLD-RTO: 0 PER 100 WBC
PLATELET # BLD AUTO: 180 K/UL (ref 150–400)
PLATELET # BLD AUTO: 192 K/UL (ref 150–400)
PMV BLD AUTO: 10.7 FL (ref 8.9–12.9)
PMV BLD AUTO: 11 FL (ref 8.9–12.9)
POTASSIUM SERPL-SCNC: 4 MMOL/L (ref 3.5–5.1)
PROTHROMBIN TIME: 10.9 SEC (ref 9–11.1)
RBC # BLD AUTO: 4.39 M/UL (ref 4.1–5.7)
RBC # BLD AUTO: 4.51 M/UL (ref 4.1–5.7)
SODIUM SERPL-SCNC: 141 MMOL/L (ref 136–145)
THERAPEUTIC RANGE,PTTT: NORMAL SECS (ref 58–77)
TROPONIN I SERPL-MCNC: 4.17 NG/ML
WBC # BLD AUTO: 7.1 K/UL (ref 4.1–11.1)
WBC # BLD AUTO: 7.5 K/UL (ref 4.1–11.1)

## 2019-01-24 PROCEDURE — 77010033678 HC OXYGEN DAILY

## 2019-01-24 PROCEDURE — C1760 CLOSURE DEV, VASC: HCPCS

## 2019-01-24 PROCEDURE — 97165 OT EVAL LOW COMPLEX 30 MIN: CPT

## 2019-01-24 PROCEDURE — 85610 PROTHROMBIN TIME: CPT

## 2019-01-24 PROCEDURE — 36415 COLL VENOUS BLD VENIPUNCTURE: CPT

## 2019-01-24 PROCEDURE — 84484 ASSAY OF TROPONIN QUANT: CPT

## 2019-01-24 PROCEDURE — 99153 MOD SED SAME PHYS/QHP EA: CPT

## 2019-01-24 PROCEDURE — 4A023N7 MEASUREMENT OF CARDIAC SAMPLING AND PRESSURE, LEFT HEART, PERCUTANEOUS APPROACH: ICD-10-PCS | Performed by: SPECIALIST

## 2019-01-24 PROCEDURE — 74011250636 HC RX REV CODE- 250/636: Performed by: INTERNAL MEDICINE

## 2019-01-24 PROCEDURE — 80048 BASIC METABOLIC PNL TOTAL CA: CPT

## 2019-01-24 PROCEDURE — 70551 MRI BRAIN STEM W/O DYE: CPT

## 2019-01-24 PROCEDURE — 97530 THERAPEUTIC ACTIVITIES: CPT

## 2019-01-24 PROCEDURE — 93458 L HRT ARTERY/VENTRICLE ANGIO: CPT

## 2019-01-24 PROCEDURE — 85027 COMPLETE CBC AUTOMATED: CPT

## 2019-01-24 PROCEDURE — B2151ZZ FLUOROSCOPY OF LEFT HEART USING LOW OSMOLAR CONTRAST: ICD-10-PCS | Performed by: SPECIALIST

## 2019-01-24 PROCEDURE — 65660000000 HC RM CCU STEPDOWN

## 2019-01-24 PROCEDURE — 74011636320 HC RX REV CODE- 636/320: Performed by: SPECIALIST

## 2019-01-24 PROCEDURE — 99152 MOD SED SAME PHYS/QHP 5/>YRS: CPT

## 2019-01-24 PROCEDURE — 74011250637 HC RX REV CODE- 250/637: Performed by: INTERNAL MEDICINE

## 2019-01-24 PROCEDURE — 74011250637 HC RX REV CODE- 250/637: Performed by: PHYSICIAN ASSISTANT

## 2019-01-24 PROCEDURE — 74011250636 HC RX REV CODE- 250/636: Performed by: SPECIALIST

## 2019-01-24 PROCEDURE — 77030013744

## 2019-01-24 PROCEDURE — 85730 THROMBOPLASTIN TIME PARTIAL: CPT

## 2019-01-24 PROCEDURE — 77030004533 HC CATH ANGI DX IMP BSC -B

## 2019-01-24 PROCEDURE — 51798 US URINE CAPACITY MEASURE: CPT

## 2019-01-24 PROCEDURE — 97116 GAIT TRAINING THERAPY: CPT

## 2019-01-24 PROCEDURE — 97161 PT EVAL LOW COMPLEX 20 MIN: CPT

## 2019-01-24 PROCEDURE — C1894 INTRO/SHEATH, NON-LASER: HCPCS

## 2019-01-24 PROCEDURE — 74011250637 HC RX REV CODE- 250/637: Performed by: SPECIALIST

## 2019-01-24 PROCEDURE — B2111ZZ FLUOROSCOPY OF MULTIPLE CORONARY ARTERIES USING LOW OSMOLAR CONTRAST: ICD-10-PCS | Performed by: SPECIALIST

## 2019-01-24 RX ORDER — SODIUM CHLORIDE 0.9 % (FLUSH) 0.9 %
5-40 SYRINGE (ML) INJECTION EVERY 8 HOURS
Status: CANCELLED | OUTPATIENT
Start: 2019-01-24

## 2019-01-24 RX ORDER — SODIUM CHLORIDE 0.9 % (FLUSH) 0.9 %
5 SYRINGE (ML) INJECTION AS NEEDED
Status: DISCONTINUED | OUTPATIENT
Start: 2019-01-24 | End: 2019-01-24

## 2019-01-24 RX ORDER — SODIUM CHLORIDE 9 MG/ML
3 INJECTION, SOLUTION INTRAVENOUS AS NEEDED
Status: DISCONTINUED | OUTPATIENT
Start: 2019-01-24 | End: 2019-01-24

## 2019-01-24 RX ORDER — SODIUM CHLORIDE 9 MG/ML
1.5 INJECTION, SOLUTION INTRAVENOUS AS NEEDED
Status: DISCONTINUED | OUTPATIENT
Start: 2019-01-24 | End: 2019-01-24

## 2019-01-24 RX ORDER — MIDAZOLAM HYDROCHLORIDE 1 MG/ML
1-10 INJECTION, SOLUTION INTRAMUSCULAR; INTRAVENOUS
Status: DISCONTINUED | OUTPATIENT
Start: 2019-01-24 | End: 2019-01-24

## 2019-01-24 RX ORDER — VERAPAMIL HYDROCHLORIDE 2.5 MG/ML
2.5-5 INJECTION, SOLUTION INTRAVENOUS
Status: DISCONTINUED | OUTPATIENT
Start: 2019-01-24 | End: 2019-01-24

## 2019-01-24 RX ORDER — GUAIFENESIN 100 MG/5ML
81 LIQUID (ML) ORAL DAILY
Status: DISCONTINUED | OUTPATIENT
Start: 2019-01-24 | End: 2019-01-28 | Stop reason: HOSPADM

## 2019-01-24 RX ORDER — ATROPINE SULFATE 0.1 MG/ML
.5-1 INJECTION INTRAVENOUS AS NEEDED
Status: DISCONTINUED | OUTPATIENT
Start: 2019-01-24 | End: 2019-01-24

## 2019-01-24 RX ORDER — FUROSEMIDE 10 MG/ML
40 INJECTION INTRAMUSCULAR; INTRAVENOUS ONCE
Status: COMPLETED | OUTPATIENT
Start: 2019-01-24 | End: 2019-01-24

## 2019-01-24 RX ORDER — FUROSEMIDE 40 MG/1
40 TABLET ORAL DAILY
Status: DISCONTINUED | OUTPATIENT
Start: 2019-01-25 | End: 2019-01-24

## 2019-01-24 RX ORDER — SODIUM CHLORIDE 0.9 % (FLUSH) 0.9 %
5-40 SYRINGE (ML) INJECTION AS NEEDED
Status: CANCELLED | OUTPATIENT
Start: 2019-01-24

## 2019-01-24 RX ORDER — LISINOPRIL 10 MG/1
10 TABLET ORAL DAILY
Status: DISCONTINUED | OUTPATIENT
Start: 2019-01-25 | End: 2019-01-24

## 2019-01-24 RX ORDER — FENTANYL CITRATE 50 UG/ML
25-200 INJECTION, SOLUTION INTRAMUSCULAR; INTRAVENOUS
Status: DISCONTINUED | OUTPATIENT
Start: 2019-01-24 | End: 2019-01-24

## 2019-01-24 RX ORDER — HEPARIN SODIUM 200 [USP'U]/100ML
1000 INJECTION, SOLUTION INTRAVENOUS AS NEEDED
Status: DISCONTINUED | OUTPATIENT
Start: 2019-01-24 | End: 2019-01-24

## 2019-01-24 RX ORDER — HEPARIN SODIUM 1000 [USP'U]/ML
1000-10000 INJECTION, SOLUTION INTRAVENOUS; SUBCUTANEOUS AS NEEDED
Status: DISCONTINUED | OUTPATIENT
Start: 2019-01-24 | End: 2019-01-24

## 2019-01-24 RX ORDER — CLOPIDOGREL BISULFATE 75 MG/1
75 TABLET ORAL DAILY
Status: DISCONTINUED | OUTPATIENT
Start: 2019-01-25 | End: 2019-01-28 | Stop reason: HOSPADM

## 2019-01-24 RX ORDER — SPIRONOLACTONE 25 MG/1
25 TABLET ORAL DAILY
Status: DISCONTINUED | OUTPATIENT
Start: 2019-01-25 | End: 2019-01-24

## 2019-01-24 RX ORDER — SODIUM CHLORIDE 9 MG/ML
25 INJECTION, SOLUTION INTRAVENOUS CONTINUOUS
Status: DISCONTINUED | OUTPATIENT
Start: 2019-01-24 | End: 2019-01-24 | Stop reason: HOSPADM

## 2019-01-24 RX ORDER — HYDRALAZINE HYDROCHLORIDE 20 MG/ML
10 INJECTION INTRAMUSCULAR; INTRAVENOUS
Status: DISCONTINUED | OUTPATIENT
Start: 2019-01-24 | End: 2019-01-28 | Stop reason: HOSPADM

## 2019-01-24 RX ORDER — CLOPIDOGREL 300 MG/1
600 TABLET, FILM COATED ORAL
Status: DISCONTINUED | OUTPATIENT
Start: 2019-01-24 | End: 2019-01-24

## 2019-01-24 RX ORDER — LIDOCAINE HYDROCHLORIDE 10 MG/ML
10-30 INJECTION INFILTRATION; PERINEURAL
Status: DISCONTINUED | OUTPATIENT
Start: 2019-01-24 | End: 2019-01-24

## 2019-01-24 RX ORDER — FUROSEMIDE 10 MG/ML
40 INJECTION INTRAMUSCULAR; INTRAVENOUS ONCE
Status: ACTIVE | OUTPATIENT
Start: 2019-01-24 | End: 2019-01-25

## 2019-01-24 RX ADMIN — ATORVASTATIN CALCIUM 40 MG: 40 TABLET, FILM COATED ORAL at 21:20

## 2019-01-24 RX ADMIN — Medication 10 ML: at 16:28

## 2019-01-24 RX ADMIN — CARVEDILOL 6.25 MG: 6.25 TABLET, FILM COATED ORAL at 18:15

## 2019-01-24 RX ADMIN — Medication 10 ML: at 00:01

## 2019-01-24 RX ADMIN — SODIUM CHLORIDE 75 ML/HR: 900 INJECTION, SOLUTION INTRAVENOUS at 08:58

## 2019-01-24 RX ADMIN — CARVEDILOL 6.25 MG: 6.25 TABLET, FILM COATED ORAL at 08:49

## 2019-01-24 RX ADMIN — SODIUM CHLORIDE 25 ML/HR: 900 INJECTION, SOLUTION INTRAVENOUS at 13:18

## 2019-01-24 RX ADMIN — IOPAMIDOL 104 ML: 755 INJECTION, SOLUTION INTRAVENOUS at 13:56

## 2019-01-24 RX ADMIN — Medication 10 ML: at 21:21

## 2019-01-24 RX ADMIN — HEPARIN SODIUM 2000 UNITS: 200 INJECTION, SOLUTION INTRAVENOUS at 13:06

## 2019-01-24 RX ADMIN — LISINOPRIL 5 MG: 5 TABLET ORAL at 08:49

## 2019-01-24 RX ADMIN — SODIUM CHLORIDE 250 ML: 900 INJECTION, SOLUTION INTRAVENOUS at 13:37

## 2019-01-24 RX ADMIN — LIDOCAINE HYDROCHLORIDE 10 ML: 10 INJECTION, SOLUTION INFILTRATION; PERINEURAL at 13:32

## 2019-01-24 RX ADMIN — FUROSEMIDE 40 MG: 10 INJECTION, SOLUTION INTRAMUSCULAR; INTRAVENOUS at 13:59

## 2019-01-24 RX ADMIN — ASPIRIN 81 MG CHEWABLE TABLET 81 MG: 81 TABLET CHEWABLE at 08:49

## 2019-01-24 NOTE — CONSULTS
Neurology Consult Note     NAME: Ivon Lugo   :  1955   MRN:  [id number]   DATE:  2019    Assessment:     Principal Problem:    CVA (cerebral vascular accident) ([address] (2019)      Pt is a 55yo male with HTN, HLD, morbid obesity, CAD s/p MI and stenting in , non-compliant with therapy, admitted 18 with acute left F/A/L weakness s/p IV TPA. No LVO. Pt had improvement in sxs. Not on antiplatelet agents at home. Echo with EF 20%, he had Troponin elevation to 16, but then trending down to 12, no c/o CP. Cardiology took him for cardiac cath today. He has not had 24 hour post-TPA imaging yet. Cardiology has started him on ASA. 81mg daily and have placed order for Plavix to start tomorrow. Cardiology has also started him on Lasix, Lisinopril, Spironolactone, Coreg. His BP was as low as 94/58 overnight.   HgbA1C 7.0  CT head - no acute intracranial abnormality. CTA H/N - Atherosclerotic calcification origin dominant left vertebral artery with associated stenosis, chronic calcification carotid bifurcations, Abrupt occlusion distal right MCA M3 branches corresponding to area of perfusion deficit. Plan:   -Continue ASA 81mg  -MRI brain ASAP  -Continue Lipitor 40mg daily  -Diabetes management per hospitalist  -I recommend permissive hypertension, I recommend treating SBP >160 at this point.   -I d/w cardiology [place], [place] and then Dr. Smiley Cruz. We are trying to balance tx for CHF, CAD, and acute stroke. Agree with dual antiplatelet ASA or Plavix per cardiac guidelines. Will d/c Lasix, Spironolactone, and Lisinopril and continue only Coreg at current dose. These medications can be restarted per CHF guidelines in one week. Subjective:   Pt doing well. No complaints. No residual deficits from CVA.      Objective:   Chart reviewed since last seen    Current Facility-Administered Medications   Medication Dose Route Frequency    aspirin chewable tablet 81 mg  81 mg Oral DAILY    [START ON 2019] lisinopril (PRINIVIL, ZESTRIL) tablet 10 mg  10 mg Oral DAILY    [START ON 2019] clopidogrel (PLAVIX) tablet 75 mg  75 mg Oral DAILY    [START ON 2019] spironolactone (ALDACTONE) tablet 25 mg  25 mg Oral DAILY    furosemide (LASIX) injection 40 mg  40 mg IntraVENous ONCE    [START ON 2019] furosemide (LASIX) tablet 40 mg  40 mg Oral DAILY    sodium chloride (NS) flush 5-40 mL  5-40 mL IntraVENous Q8H    sodium chloride (NS) flush 5-40 mL  5-40 mL IntraVENous PRN    niCARdipine (CARDENE) 25 mg in 0.9% sodium chloride 250 mL infusion  0-15 mg/hr IntraVENous TITRATE    0.9% sodium chloride infusion  75 mL/hr IntraVENous CONTINUOUS    sodium chloride (NS) flush 5-40 mL  5-40 mL IntraVENous Q8H    sodium chloride (NS) flush 5-40 mL  5-40 mL IntraVENous PRN    labetalol (NORMODYNE;TRANDATE) 20 mg/4 mL (5 mg/mL) injection 10 mg  10 mg IntraVENous Q10MIN PRN    acetaminophen (TYLENOL) tablet 650 mg  650 mg Oral Q6H PRN    bisacodyl (DULCOLAX) tablet 5 mg  5 mg Oral DAILY PRN    atorvastatin (LIPITOR) tablet 40 mg  40 mg Oral QHS    carvedilol (COREG) tablet 6.25 mg  6.25 mg Oral BID WITH MEALS    ondansetron (ZOFRAN) injection 4 mg  4 mg IntraVENous Q6H PRN     Home Meds: None    No Known Allergies    SH: , No Tob, +ETOH once a week, 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 Cardiac: Regular rate and rhythm with no murmurs. Extremities: 2+ Radial pulses, no cyanosis or edema    Neurological Exam:  Mental Status: Oriented to time, place and person. Speech and language intact. Attention and fund of knowledge appropriate. Normal recent and remote memory. Cranial Nerves:   VFF, PERRL, EOMI, no nystagmus, no ptosis. Facial sensation is normal. Facial movement is symmetric. Palate is midline. Tongue is midline. Hearing is intact bilaterally. Motor:  5/5 strength in upper and lower proximal and distal muscles. Normal bulk and tone. No PD. No tremors   Reflexes:   Deep tendon reflexes 2+ and symmetric. Toes downgoing. Sensory:   Intact to LT and PP   Gait:  Gait not assessed due to post-cath   Cerebellar:  Intact FTN and HTS, ERICA intact         Lab Review   Recent Results (from the past 24 hour(s))   TROPONIN I    Collection Time: 01/23/19  7:07 PM   Result Value Ref Range    Troponin-I, Qt. 8.10 (H) <0.05 ng/mL   CBC W/O DIFF    Collection Time: 01/24/19  2:01 AM   Result Value Ref Range    WBC 7.1 4.1 - 11.1 K/uL    RBC 4.39 4. 10 - 5.70 M/uL    HGB 14.8 12.1 - 17.0 g/dL    HCT 45.7 36.6 - 50.3 %    .1 (H) 80.0 - 99.0 FL    MCH 33.7 26.0 - 34.0 PG    MCHC 32.4 30.0 - 36.5 g/dL    RDW 13.2 11.5 - 14.5 %    PLATELET 029 661 - 675 K/uL    MPV 11.0 8.9 - 12.9 FL    NRBC 0.0 0  WBC    ABSOLUTE NRBC 0.00 0.00 - 7.83 K/uL   METABOLIC PANEL, BASIC    Collection Time: 01/24/19  2:01 AM   Result Value Ref Range    Sodium 141 136 - 145 mmol/L    Potassium 4.0 3.5 - 5.1 mmol/L    Chloride 106 97 - 108 mmol/L    CO2 27 21 - 32 mmol/L    Anion gap 8 5 - 15 mmol/L    Glucose 151 (H) 65 - 100 mg/dL    BUN 17 6 - 20 MG/DL    Creatinine 0.95 0.70 - 1.30 MG/DL    BUN/Creatinine ratio 18 12 - 20      GFR est AA >60 >60 ml/min/1.73m2    GFR est non-AA >60 >60 ml/min/1.73m2    Calcium 8.6 8.5 - 10.1 MG/DL   TROPONIN I Collection Time: 01/24/19  2:01 AM   Result Value Ref Range    Troponin-I, Qt. 4.17 (H) <0.05 ng/mL   CBC W/O DIFF    Collection Time: 01/24/19 12:30 PM   Result Value Ref Range    WBC 7.5 4.1 - 11.1 K/uL    RBC 4.51 4.10 - 5.70 M/uL    HGB 15.0 12.1 - 17.0 g/dL    HCT 46.8 36.6 - 50.3 %    .8 (H) 80.0 - 99.0 FL    MCH 33.3 26.0 - 34.0 PG    MCHC 32.1 30.0 - 36.5 g/dL    RDW 13.2 11.5 - 14.5 %    PLATELET 002 320 - 878 K/uL    MPV 10.7 8.9 - 12.9 FL    NRBC 0.0 0  WBC    ABSOLUTE NRBC 0.00 0.00 - 0.01 K/uL   PTT    Collection Time: 01/24/19 12:30 PM   Result Value Ref Range    aPTT 26.7 22.1 - 32.0 sec    aPTT, therapeutic range     58.0 - 77.0 SECS   PROTHROMBIN TIME + INR    Collection Time: 01/24/19 12:30 PM   Result Value Ref Range    INR 1.1 0.9 - 1.1      Prothrombin time 10.9 9.0 - 11.1 sec       Additional comments:  I have reviewed the patient's new clinical lab test results. I have personally reviewed the patient's radiographs. CT Results (most recent):  Results from Hospital Encounter encounter on 01/23/19   CT PERF W CBF    Narrative *PRELIMINARY REPORT*  There is probable occlusion of a distal branch of the right MCA associated with  a small area of abnormal perfusion in the right parietotemporal region. The  right vertebral artery is developmentally diminutive in its course and  terminates in PICA. An attempt was made to discuss these findings with the emergency department on  1/23/2019 at 0245 hours by Dr. Merced Sheets but the patient was not reached until 0305  hours. [phone]   Final report to follow. *END PRELIMINARY REPORT*    *FINAL REPORT BELOW*  EXAM:  CTA CODE NEURO HEAD AND NECK W CONT, CT PERF W CBF  INDICATION:  CVA  TECHNIQUE: Axial spiral acquired CT angiography was performed from the aortic  arch up through the intracranial vessels. This was performed during intravenous  bolus infusion 100 mL of Isovue 370.  Post-processing with  MIP reconstructions  from aortic arch to the skull base. CT dose reduction was achieved through use  of a standardized protocol tailored for this examination and automatic exposure  control for dose modulation. COMPARISON: CT head, CT perfusion  FINDINGS:  Normal origins of the brachiocephalic arteries from the arch. Some  atherosclerotic calcification in the aorta. The left vertebral artery is dominant. The right vertebral artery is relatively  small. There is dense calcification associated with the origin of the left  vertebral artery which may be associated with a significant stenosis. Cervical vertebral arteries are adequately maintained. Atherosclerotic  calcification related to be 4 segments of both vertebral arteries with at least  mild stenosis. Basilar artery is normal and supplies both posterior cerebral  arteries. Common carotid arteries are adequately maintained. Atherosclerotic calcification in both carotid bifurcations without hemodynamic  stenosis. 0% stenosis by NASCET criteria. Tortuosity of the distal cervical  internal carotid arteries with associated calcification and no significant  stenosis. Carotid siphons also have atherosclerotic calcification with relatively mild  stenosis. Symmetric opacification of middle and anterior cerebral arteries. There is an abrupt occlusion of the right middle cerebral artery distal M3  branch corresponding to the area perfusion abnormality in the right lateral  frontal lobe. Impression IMPRESSION:  1. Atherosclerotic calcification origin dominant left vertebral artery with  associated stenosis which may be significant but hard to define due to degree of  calcification and tortuosity. 2. CHRONIC calcification carotid bifurcations without hemodynamically  significant stenosis. 3. Abrupt occlusion distal right middle cerebral artery M3 branches  corresponding to area of perfusion deficit. EXAM:  CTA CODE NEURO HEAD AND NECK W CONT, CT PERF W CBF  INDICATION:  Acute left-sided facial droop.  Left arm and leg weakness. PROCEDURE:  During rapid bolus infusion 40 mL Isovue-370 CT perfusion acquisition through  the mid calvarium was obtained with color coded mapping reconstructions of time  to peak, blood volume, blood flow, mean transit time and  T-max . CT dose  reduction was achieved through use of a standardized protocol tailored for this  examination and automatic exposure control for dose modulation. COMPARISON: CT  FINDINGS:  There is an area of diminished perfusion in the right lateral frontal lobe  operculum. Possible less defined area of reduced perfusion extending  posteriorly. Final result (Exam End: 1/23/2019 01:29) Provider Status: Open   Study Result     EXAM:  CT CODE NEURO HEAD WO CONTRAST     INDICATION: Weakness.     COMPARISON: None.     TECHNIQUE: Axial noncontrast head CT from foramen magnum to vertex. Coronal and  sagittal reformatted images were obtained. CT dose reduction was achieved  through use of a standardized protocol tailored for this examination and  automatic exposure control for dose modulation. Adaptive statistical iterative  reconstruction (ASIR) was utilized.     FINDINGS:  There is diffuse age-related parenchymal volume loss. The ventricles  and sulci are age-appropriate without hydrocephalus. There is no mass effect or  midline shift. There is no intracranial hemorrhage or extra-axial fluid  collection. There is no significant white matter disease. The gray-white matter  differentiation is maintained. The basal cisterns are patent.     The osseous structures are intact. There is mild mucosal thickening in the left  maxillary sinus. The remaining visualized paranasal sinuses and mastoid air  cells are clear.     IMPRESSION:   No acute intracranial abnormality. Care Plan discussed with:  Patient x   Family x   RN x   Care Manager    Consultant/Specialist:  x     Signed: Jessica Guajardo MD

## 2019-01-24 NOTE — PROGRESS NOTES
Problem: Pressure Injury - Risk of  Goal: *Prevention of pressure injury  Document Patrice Scale and appropriate interventions in the flowsheet. Outcome: Progressing Towards Goal  Pressure Injury Interventions: Activity Interventions: Increase time out of bed, Pressure redistribution bed/mattress(bed type)    Mobility Interventions: Assess need for specialty bed, HOB 30 degrees or less, Pressure redistribution bed/mattress (bed type)    Nutrition Interventions: Document food/fluid/supplement intake                    Problem: Falls - Risk of  Goal: *Absence of Falls  Document Hunter Fall Risk and appropriate interventions in the flowsheet.   Outcome: Progressing Towards Goal  Fall Risk Interventions:  Mobility Interventions: Communicate number of staff needed for ambulation/transfer, Patient to call before getting OOB         Medication Interventions: Patient to call before getting OOB    Elimination Interventions: Call light in reach

## 2019-01-24 NOTE — PROGRESS NOTES
Reason for Admission:   Presented to the ED with left sided weakness and left facial droop. RRAT Score:   12                  Plan for utilizing home health: TBD                         Likelihood of Readmission:  moderate                         Transition of Care Plan:  Rehab    Patient presented to the ED with left facial droop and left sided weakness. Code stroke called, patient received TPA. Care manager met with patient and his wife to explain role and discuss transitions of care. Patient is independent no previous home health or equipment needs. Patient confirmed he does not have a PCP and is not interested in getting one, he will follow up with his neurologist and cardiologist at discharge. He uses the [facility] on [address] as his pharmacy. Confirmed with patient he is currently uninsured. [facility] met with patient this morning and provided him with a financial aid application. Patient is currently in the Cath Lab as his troponin has been elevated. Consult for rehab noted. Will await therapy notes to determine plan for discharge.  Keenan Ruvalcaba RN,CRM    Care Management Interventions  Palliative Care Criteria Met (RRAT>21 & CHF Dx)?: No  MyChart Signup: No  Discharge Durable Medical Equipment: No  Physical Therapy Consult: Yes  Occupational Therapy Consult: Yes  Speech Therapy Consult: Yes  Current Support Network: Lives with Spouse(wife Indio Sawyer [phone])  Confirm Follow Up Transport: Family

## 2019-01-24 NOTE — PROGRESS NOTES
Neurology Consult                                                       Subjective:      Romina Valladares  is a 58 y.o.  male with a  Medical history of CAD, Hypertension, dyslipidemia, morbid obesity s/p stents placed in 2012 and during that time he was put on ASA, Plavix, Statin, ACE and Beta blockers . Had an ejection fracture of 50% also in 2012. It is noted that patient stopped following up with cardiology and is no longer taking any of his cardiac meds due to its interference  with his ability to work as a  . He  was admitted on 01/24/2018 due to a complaint of acute left sided weakness to include his (L) UE and (R) LE  He went to bed that night around 2130 and 40 minutes later got up to use restroom. He noticed at that time his left upper and lower extremity was weak and  when he got back into bed that is when his wife noticed that he also had some left sided facial droop present as well. .  CT head: completed revealed No acute intracranial abnormality. CTA head and neck reveals: Atherosclerotic calcification origin dominant left vertebral artery with associated stenosis, chronic calcification carotid bifurcations, Abrupt occlusion distal right middle cerebral artery M3 branches corresponding to area of perfusion deficit. Patient was provided TPA in the Emergency room and his symptoms were improving;   Neuro IR was consulted and images reviewed by Dr. Anthony Kyle and found the  patient was not a candidate for embolectomy. He was then admitted to the Neuro ICU for further care. Neurology was consulted due to srtoke symptoms that were occurring.      Past Medical History:   Diagnosis Date    Arthritis     BMI 40.0-44.9, adult ([facility] [address] 03/20/2018    CAD (coronary artery disease)     Depression     Diabetes (HCC)     GERD (gastroesophageal reflux disease)     Headache(784.0)     Hx of carbuncle of skin and subcutaneous tissue 03/20/2018    Hyperlipidemia     Hypertension     Morbid obesity ([address] 03/20/2018    Psychiatric disorder     Depressioin, anxiety     Past Surgical History:   Procedure Laterality Date    HX GYN      c section    HX HEENT      tonsillectomy    HX ORTHOPAEDIC      lumbar spine surgery    HX ORTHOPAEDIC      bilat knee arthroscopy    HX ORTHOPAEDIC      cervical fusion    HX ORTHOPAEDIC      carpal tunnel surgery      Family History   Problem Relation Age of Onset    Cancer Maternal Aunt     Diabetes Brother     Heart Disease Maternal Grandmother      Social History     Tobacco Use    Smoking status: Never Smoker    Smokeless tobacco: Never Used   Substance Use Topics    Alcohol use: No      Current Facility-Administered Medications   Medication Dose Route Frequency Provider Last Rate Last Dose    midazolam (VERSED) 1 mg/mL injection             propofol (DIPRIVAN) infusion  0-50 mcg/kg/min IntraVENous TITRATE Carla Loaiza MD 32 mL/hr at 01/12/19 2130 50 mcg/kg/min at 01/12/19 2130    midazolam (VERSED) 1 mg/mL injection             LORazepam (ATIVAN) 1 mg/mL in D5W infusion  0-5 mg/hr IntraVENous TITRATE Carla Loaiza MD 2.5 mL/hr at 01/12/19 1833 2.5 mg/hr at 01/12/19 1833    midazolam (VERSED) injection 5 mg  5 mg IntraVENous NOW Carla Loaiza MD        acetaminophen (TYLENOL) suppository 650 mg  650 mg Rectal Q4H PRN Duane Huynh MD        0.9% sodium chloride infusion  100 mL/hr IntraVENous CONTINUOUS Duane Huynh  mL/hr at 01/12/19 1834 100 mL/hr at 01/12/19 1834    sodium chloride (NS) flush 5-40 mL  5-40 mL IntraVENous Q8H Duane Huynh MD   10 mL at 01/12/19 2144    sodium chloride (NS) flush 5-40 mL  5-40 mL IntraVENous PRN Duane Huynh MD        ondansetron (ZOFRAN) injection 4 mg  4 mg IntraVENous Q6H PRN Duane Huynh MD        [START ON 1/13/2019] aspirin (ASA) suppository 300 mg  300 mg Rectal DAILY Duane Huynh MD        bisacodyl (DULCOLAX) suppository 10 mg  10 mg Rectal DAILY PRN Jethro Prieto MD NADIA        heparin (porcine) injection 5,000 Units  5,000 Units SubCUTAneous Q8H Duane Huynh MD   5,000 Units at 01/12/19 1921    insulin lispro (HUMALOG) injection   SubCUTAneous AC&HS Nigel Homans A, MD   4 Units at 01/12/19 2143    glucose chewable tablet 16 g  4 Tab Oral PRN Duane Huynh MD        dextrose (D50W) injection syrg 12.5-25 g  12.5-25 g IntraVENous PRN Duane Huynh MD        glucagon (GLUCAGEN) injection 1 mg  1 mg IntraMUSCular PRN Duane Huynh MD        pantoprazole (PROTONIX) 40 mg in sodium chloride 0.9% 10 mL injection  40 mg IntraVENous DAILY Duane Huynh MD   40 mg at 01/12/19 1928    potassium chloride 10 mEq in 50 ml IVPB  10 mEq IntraVENous Q1H Duane Huynh MD 50 mL/hr at 01/12/19 2228 10 mEq at 01/12/19 2228    niCARdipine (CARDENE) 25 mg in 0.9% sodium chloride 250 mL infusion  0-15 mg/hr IntraVENous TITRATE Duane Huynh MD 75 mL/hr at 01/12/19 2131 7.5 mg/hr at 01/12/19 2131    acetaminophen (OFIRMEV) infusion 1,000 mg  1,000 mg IntraVENous ONCE Madelyn Jennings MD            Allergies   Allergen Reactions    Sulfa (Sulfonamide Antibiotics) Other (comments)     Eyes burned after using sulfa eyedrops    Gabapentin Other (comments)     Swelling in ankles    Oxycodone Unknown (comments)     \"hallucinations\"    Tape [Adhesive] Rash       Review of Systems:  A comprehensive review of systems was negative except for that written in the History of Present Illness. Objective:     Vitals:    01/12/19 2200 01/12/19 2205 01/12/19 2215 01/12/19 2230   BP: 111/76 127/57 134/60 137/55   Pulse: (!) 103 99 99 98   Resp: 11 14 23 19   Temp:    (!) 103.2 °F (39.6 °C)   SpO2: 98% 98% 98% 99%   Weight:       Height:            Physical Exam:    CONSTITUTIONAL: Well-appearing; well-nourished; in NAD  HEAD: Normocephalic; atraumatic  EYES: PERRL; EOM intact; conjunctiva and sclera are clear bilaterally.   ENT: No rhinorrhea; normal pharynx with no tonsillar hypertrophy; mucous membranes pink/moist, no erythema, no exudate. NECK: Supple; non-tender; no cervical lymphadenopathy  CARD: Normal S1, S2; no murmurs, rubs, or gallops. Regular rate and rhythm. RESP: Normal respiratory effort; breath sounds clear and equal bilaterally; no wheezes, rhonchi, or rales. ABD: Normal bowel sounds; non-distended; non-tender; no palpable organomegaly, no masses, no bruits. Back Exam: Normal inspection; no vertebral point tenderness, no CVA tenderness. Normal range of motion. EXT: Normal ROM in all four extremities; non-tender to palpation; no swelling or deformity; distal pulses are normal, Edema noted in bilteral lower extremities. SKIN: Warm; dry; no rash. NEURO:Alert and oriented x 3,     Imaging:    CT head: 1/23/2019  IMPRESSION:   No acute intracranial abnormality      CTA head/neck  PROCEDURE:  During rapid bolus infusion 40 mL Isovue-370 CT perfusion acquisition through  the mid calvarium was obtained with color coded mapping reconstructions of time  to peak, blood volume, blood flow, mean transit time and  T-max . CT dose  reduction was achieved through use of a standardized protocol tailored for this  examination and automatic exposure control for dose modulation. COMPARISON: CT  FINDINGS:  There is an area of diminished perfusion in the right lateral frontal lobe  operculum. Possible less defined area of reduced perfusion extending  posteriorly    ECHO:  PROCEDURE: This was a routine study. The study included complete 2D  imaging, M-mode, complete spectral Doppler, and color Doppler. The heart  rate was 65 bpm, at the start of the study. Systolic blood pressure was  114 mmHg, at the start of the study. Diastolic blood pressure was 84 mmHg,  at the start of the study. Intravenous contrast (Definity) was  administered to opacify the left ventricle.  This was a technically  difficult study due to patient body habitus    LEFT VENTRICLE: The ventricle was mildly to moderately dilated. Systolic  function was markedly reduced. Ejection fraction was estimated in the  range of 25 % to 30 %. There was severe diffuse hypokinesis with distinct  regional wall motion abnormalities. RIGHT VENTRICLE: Not well visualized. LEFT ATRIUM: The atrium was moderately to severely dilated. ATRIAL SEPTUM: Not well visualized. RIGHT ATRIUM: Not well visualized. MITRAL VALVE: There was mild annular calcification. There was normal  leaflet separation. AORTIC VALVE: The valve was trileaflet. Leaflets exhibited mild to  moderate calcification. DOPPLER: There was mild stenosis. TRICUSPID VALVE: Not well visualized. DOPPLER: There was no evidence for  tricuspid stenosis. There was trivial regurgitation. PULMONIC VALVE: Not well visualized. AORTA: The root exhibited normal size. PERICARDIUM: There was no pericardial effusion. Assessment:     Hospital Problems  Date Reviewed: 1/12/2019          Codes Class Noted POA    * (Principal) Acute CVA (cerebrovascular accident) [facility]) ICD-10-CM: I63.9  ICD-9-CM: 434.91  1/12/2019 Yes              Plan:     - Abrupt occlusion distal right middle cerebral artery with successful/timely administration of TPA with rapid recovery   - SBP < 140  - HOB at 30 degrees  - MRI brain   - Elevated tropin w/o chest pain with possible cardiac cath on 1/24/2019 (cardiolgy consulted)       Thank you for this consult and participating in the care of this patient. I have discussed the diagnosis with the patient and the intended plan as seen in the above orders. Patient is in agreement.       Signed By: LES Galindo     January 12, 2019

## 2019-01-24 NOTE — PROCEDURES
Cardiac Catheterization Procedure Note   Patient: Estuardo Parham  MRN: [id number]  SSN: [id number]   YOB: 1955 Age: 58 y.o. Sex: male    Date of Procedure: 1/24/2019   Pre-procedure Diagnosis: Coronary Artery Disease  Post-procedure Diagnosis: Coronary Artery Disease  Procedure: Left Heart Cath  :  Dr. Vanessa Dumas MD    Assistant(s):  None  Anesthesia: Moderate Sedation   Estimated Blood Loss: Less than 10 mL   Specimens Removed: None  Findings: LAD widely patent stent, no significant disease. LCX co-dominant, first marginal has multiple high grade lesions including at ostium. Ramus small with tight lesion, 2.0 vessel. RCA small with multiple lesions proximal and mid. LV dilated with apical akinesis and severe global hypokinesis, EF 20%, LVEDP 40-45. Recommend medical therapy for now, given IV lasix after procedure.   Complications: None   Implants:  None  Signed by:  Vanessa Dumas MD  1/24/2019  2:11 PM

## 2019-01-24 NOTE — PROGRESS NOTES
Problem: Self Care Deficits Care Plan (Adult)  Goal: *Acute Goals and Plan of Care (Insert Text)  Occupational Therapy Goals  Initiated 1/24/2019  1. Patient will perform grooming standing at sink for 10 minutes with no LOB with independence within 7 day(s). 2.  Patient will perform upper body ADLs with independence within 7 day(s). 3.  Patient will perform lower body ADLs with independence within 7 day(s). 4.  Patient will perform toilet transfers with independence within 7 day(s). 5.  Patient will perform all aspects of toileting with independence within 7 day(s). 6.  Patient will participate in upper extremity therapeutic exercise/activities with independence for 5 minutes within 7 day(s). 7.  Patient will utilize energy conservation techniques during functional activities with verbal cues within 7 day(s). Occupational Therapy EVALUATION  Patient: Estuardo Parham (69 y.o. male)  Date: 1/24/2019  Primary Diagnosis: CVA (cerebral vascular accident) [facility])       Precautions:  Fall    ASSESSMENT :  Based on the objective data described below, the patient presents with Independent upper body ADLs, Supervision lower body ADLs, and Stand-by assistance assist functional mobility. Patient BUE ROM, strength, FM/GM coordination and sensation WFL. Patient vision and balance WFL. Fugl Del Rio assessment not indicated at this time. The following are barriers to independence with ADLs while in acute care:   - Cognitive and/or behavioral: none  - Medical condition: cardiopulmonary tolerance, hypertension precautions (SPB<140)  - Other:       Patient will benefit from skilled acute intervention to address the above impairments.   Patients rehabilitation potential is considered to be Good    Discharge recommendations: Home health (to increase independence and safety) and 24 supervision  Barriers to discharging home, in addition to above listed impairments: none     Equipment recommendations for successful discharge (if) home: none     PLAN :  Recommendations and Planned Interventions: self care training, functional mobility training, therapeutic exercise, balance training, therapeutic activities, endurance activities, patient education, home safety training and family training/education    Frequency/Duration: Patient will be followed by occupational therapy 3 times a week to address goals. SUBJECTIVE:   Patient stated I feel fine except a little of this droop in my face.     OBJECTIVE DATA SUMMARY:   HISTORY:   Past Medical History:   Diagnosis Date    CAD (coronary artery disease)     stent, MI 4-10-12     Past Surgical History:   Procedure Laterality Date    CARDIAC SURG PROCEDURE UNLIST      cath with stents       Prior Level of Function/Environment/Context: Patient lives in 2 level home with wife and was IND with ADLs, IADLs and functional mobility PTA. Patient was working and driving PTA. Patient has all needed DME 3 80year old mother living with family however does not use DME at baseline. Wife available to assist at baseline    [address]: Private residence  # Steps to Enter: 5  Rails to Enter: Yes  Hand Rails : Bilateral  One/Two Story Residence: Two story, live on 1st floor  Living Alone: No  Support Systems: Family member(s), Spouse/Significant Other/Partner  Patient Expects to be Discharged to[de-identified] Private residence  Current DME Used/Available at Home: Grab bars, Commode, bedside, Shower chair  Tub or Shower Type: Shower    Hand dominance: Right    EXAMINATION OF PERFORMANCE DEFICITS:  Cognitive/Behavioral Status:  Neurologic State: Alert  Orientation Level: Oriented X4  Cognition: Appropriate for age attention/concentration; Follows commands  Perception: Appears intact  Perseveration: No perseveration noted  Safety/Judgement: Awareness of environment; Fall prevention; Insight into deficits    Skin: Appears grossly intact    Edema: none noted in BUEs    Hearing:   Auditory  Auditory Impairment: None    Vision/Perceptual:    Tracking: Able to track stimulus in all quadrants w/o difficulty    Diplopia: No    Acuity: Within Defined Limits    Corrective Lenses: Reading glasses    Range of Motion:  In BUEs  AROM: Within functional limits    Strength: In BUEs  Strength: Within functional limits    Coordination:  Coordination: Within functional limits  Fine Motor Skills-Upper: Left Intact; Right Intact    Gross Motor Skills-Upper: Left Intact; Right Intact    Tone & Sensation:  In BUEs  Tone: Normal  Sensation: Intact    Balance:  Sitting: Intact  Standing: Intact    Functional Mobility and Transfers for ADLs:  Bed Mobility:  Rolling: (NT - received in chair and returned to chair)    Transfers:  Sit to Stand: Stand-by assistance  Stand to Sit: Stand-by assistance  Bed to Chair: Stand-by assistance(Infer )  Toilet Transfer : Stand-by assistance(Infer)    ADL Assessment:  Feeding: Independent(Infer per obs of func reach, BUE ROM, FM/GM coordination)    Oral Facial Hygiene/Grooming: Independent(Infer per obs of func reach, BUE ROM, FM/GM coordination)    Bathing: Supervision(Infer per obs of func mob, func reach, BUE ROM, balance)    Upper Body Dressing: Independent(Infer per obs of func reach, BUE ROM, FM/GM coordination)    Lower Body Dressing: Supervision(Infer per obs of func mob, func reach, BUE ROM, balance)    Toileting: Supervision(Infer per obs of func mob, func reach, BUE ROM, balance)    ADL Intervention and task modifications:  Lower Body Dressing Assistance  Socks: Supervision/set-up  Leg Crossed Method Used: No  Position Performed: Seated in chair    Cognitive Retraining  Safety/Judgement: Awareness of environment; Fall prevention; Insight into deficits     Functional Measure:  Barthel Index:    Bathin  Bladder: 10  Bowels: 10  Groomin  Dressing: 10  Feeding: 10  Mobility: 10  Stairs: 5  Toilet Use: 10  Transfer (Bed to Chair and Back): 10  Total: 85        The Barthel ADL Index: Guidelines  1. The index should be used as a record of what a patient does, not as a record of what a patient could do. 2. The main aim is to establish degree of independence from any help, physical or verbal, however minor and for whatever reason. 3. The need for supervision renders the patient not independent. 4. A patient's performance should be established using the best available evidence. Asking the patient, friends/relatives and nurses are the usual sources, but direct observation and common sense are also important. However direct testing is not needed. 5. Usually the patient's performance over the preceding 24-48 hours is important, but occasionally longer periods will be relevant. 6. Middle categories imply that the patient supplies over 50 per cent of the effort. 7. Use of aids to be independent is allowed. Rory Vazquez., Barthel, D.W. (6065). Functional evaluation: the Barthel Index. [address] [facility] (14)2. LATONYA Baltazar, [place]., [place], [address] [place] [address] (1999). Measuring the change indisability after inpatient rehabilitation; comparison of the responsiveness of the Barthel Index and Functional [place] Measure. Journal of Neurology, Neurosurgery, and Psychiatry, 66(4), 535-205. Nayeli Arias, N.J.A, NIR MartínezJ.JUNIOR, & David Gan MVIVIANE. (2004.) Assessment of post-stroke quality of life in cost-effectiveness studies: The usefulness of the Barthel Index and the EuroQoL-5D.  Quality of Life Research, 15, 677-15       Occupational Therapy Evaluation Charge Determination   History Examination Decision-Making   LOW Complexity : Brief history review  LOW Complexity : 1-3 performance deficits relating to physical, cognitive , or psychosocial skils that result in activity limitations and / or participation restrictions  LOW Complexity : No comorbidities that affect functional and no verbal or physical assistance needed to complete eval tasks       Based on the above components, the patient evaluation is determined to be of the following complexity level: LOW   Pain:  Pre treatment: 0 /10   Post treatment:  0/10   Location: none    Description:none   Aggravating factors: Activity Tolerance:   good  Please refer to the flowsheet for vital signs taken during this treatment. After treatment patient left:   Up in chair  Caregiver at bedside  Call light within reach  RN notified   COMMUNICATION/EDUCATION:   The patients plan of care was discussed with: Physical Therapist and Registered Nurse. Home safety education was provided and the patient/caregiver indicated understanding. and Patient/family have participated as able in goal setting and plan of care. This patients plan of care is appropriate for delegation to [facility].     Thank you for this referral.  Almyra Ormond, OT  Time Calculation: 18 mins

## 2019-01-24 NOTE — PROGRESS NOTES
Problem: Mobility Impaired (Adult and Pediatric)  Goal: *Acute Goals and Plan of Care (Insert Text)  Physical Therapy Goals  Initiated 1/24/2019  1. Patient will move from supine to sit and sit to supine  in bed with modified independence within 7 day(s). 2.  Patient will transfer from bed to chair and chair to bed with modified independence using the least restrictive device within 7 day(s). 3.  Patient will perform sit to stand with modified independence within 7 day(s). 4.  Patient will ambulate with modified independence for 500 feet with the least restrictive device within 7 day(s). 5.  Patient will ascend/descend 7 stairs with single handrail(s) with supervision/set-up within 7 day(s). 6.  Patient will improve Harley Balance score by 7 points within 7 days. physical Therapy EVALUATION  Patient: Tita Goldmann (86 y.o. male)  Date: 1/24/2019  Primary Diagnosis: CVA (cerebral vascular accident) [facility])       Precautions:   Fall    ASSESSMENT :   Based on the objective data described below, the patient presents with Stand-by assistance functional transfers, Stand-by assistance and Contact guard assistance gait, 40 feet ambulated, and with gait belt. The following are barriers to independence while in acute care:  -Medical condition: balance and gait  -Other:       Patient will benefit from skilled acute intervention to address the above impairments. Patients rehabilitation potential is considered to be Good    Discharge recommendations: Anticipate outpatient PT versus no needs pending medical course (cardiac cath later today)  Patient's barriers to discharging home, in addition to above impairments: none.      Equipment recommendations for successful discharge (if) home: none       PLAN :  Recommendations and Planned Interventions: transfer training, gait training, therapeutic exercises, therapeutic activities and neuromuscular re-education      Frequency/Duration: Patient will be followed by physical therapy  5 times a week to address goals (Anticipate 1-2 more sessions). SUBJECTIVE:   Patient stated I've been up already.     OBJECTIVE DATA SUMMARY:   HISTORY:    Past Medical History:   Diagnosis Date    CAD (coronary artery disease)     stent, MI 4-10-12     Past Surgical History:   Procedure Laterality Date    CARDIAC SURG PROCEDURE UNLIST      cath with stents     Prior Level of Function/Home Situation: independent, ambulatory without AD, works full time at [employer], lives with spouse  Personal factors and/or comorbidities impacting plan of care:     Home Situation  Home Environment: Private residence  # Steps to Enter: 5  Rails to Enter: Yes  Hand Rails : Bilateral  One/Two Story Residence: Two story, live on 1st floor  Living Alone: No  Support Systems: Family member(s), Spouse/Significant Other/Partner  Patient Expects to be Discharged to[de-identified] Private residence  Current DME Used/Available at Home: Grab bars, Commode, bedside, Shower chair  Tub or Shower Type: Shower    EXAMINATION/PRESENTATION/DECISION MAKING:   Critical Behavior:  Neurologic State: Eyes open spontaneously  Orientation Level: Oriented X4  Cognition: Appropriate decision making, Appropriate for age attention/concentration, Appropriate safety awareness, Follows commands  Safety/Judgement: Awareness of environment, Insight into deficits  Hearing: Auditory  Auditory Impairment: None  Skin:    Edema:   Range Of Motion:  AROM: Within functional limits                       Strength:    Strength:  Within functional limits                    Tone & Sensation:   Tone: Normal              Sensation: Intact               Coordination:     Vision:      Functional Mobility:  Bed Mobility:              Transfers:  Sit to Stand: Stand-by assistance  Stand to Sit: Stand-by assistance                       Balance:   Sitting: Intact  Standing: Intact  Ambulation/Gait Training:  Distance (ft): 40 Feet (ft)  Assistive Device: Gait belt  Ambulation - Level of Assistance: Stand-by assistance;Contact guard assistance        Gait Abnormalities: Decreased step clearance;Trunk sway increased        Base of Support: Widened     Speed/Kimberly: Pace decreased (<100 feet/min)         Functional Measure:  Harley Balance Test:    Sitting to Standing: 3  Standing Unsupported: 4  Sitting with Back Unsupported: 4  Standing to Sittin  Transfers: 4  Standing Unsupported with Eyes Closed: 3  Standing Unsupported with Feet Together: 4  Reach Forward with Outstretched Arm: 3   Object: 3  Turn to Look Over Shoulders: 4  Turn 360 Degrees: 4  Alternate Foot on Step/Stool: 3  Standing Unsupported One Foot in Front: 3  Stand on One Le  Total: 48         56=Maximum possible score;   0-20=High fall risk  21-40=Moderate fall risk   41-56=Low fall risk          Based on the above components, the patient evaluation is determined to be of the following complexity level: LOW         Activity Tolerance:   good  Please refer to the flowsheet for vital signs taken during this treatment. After treatment patient left:   Up in chair  Bed/Chair-wheels locked  RN notified  Family at bedside     COMMUNICATION/EDUCATION:   The patients plan of care was discussed with: Occupational Therapist and Registered Nurse. Fall prevention education was provided and the patient/caregiver indicated understanding., Patient/family have participated as able in goal setting and plan of care. and Patient/family agree to work toward stated goals and plan of care.     Thank you for this referral.  Saloni Herman, PT, DPT   Time Calculation: 19 mins

## 2019-01-24 NOTE — PROGRESS NOTES
PCCM    Events reviewed  Discussed on Multi-D rounds  S/p tpa CVA   S/p cath--> for medical management per cards    Transfer to tele

## 2019-01-24 NOTE — PROGRESS NOTES
Hospitalist Progress Note  Shea Martinez MD  Answering service: [phone] OR [phone] from in house phone        Date of Service:  2019  NAME:  Kareem Ramires  :  1955  MRN:  [id number]      Admission Summary:   41-year-old man with a past medical history significant for coronary artery disease status post stent placement, hypertension, dyslipidemia, morbid obesity who was in his usual state of health until the day of presentation at the emergency room when the patient developed sudden onset of left-sided facial droop. It was reported that the patient was last known well at about 9:30 yesterday evening. The patient went to bed around that time. Forty minutes later, the patient woke up to use the bathroom and realized that the left side of his body was weak. He called his wife on the phone who was in another room sleeping, and the wife came to the room and found that the patient has left facial droop in addition to the left-sided weakness. Patient was brought to the emergency room for further evaluation        Interval history / Subjective:     Patient seen and examined and no focal neuro deficit   He is doing good   He had cardiac cath today and didn't need stenting   His EF is low but he doesnot look fluid overload        Assessment & Plan:       Acute stroke s/p TPA   Post 24 hour window of tpa , stable and no neuro deficit   Neurology wants to continue permissive HTN   ON Beta blocker , asa and plavix for now  CTA reviwed , no intervention needed      CAD s/p MI and stenting in ,  S/p cath   LAD widely patent stent, no significant disease. LCX co-dominant, first marginal has multiple high grade lesions including at ostium. Ramus small with tight lesion, 2.0 vessel. RCA small with multiple lesions proximal and mid. LV dilated with apical akinesis and severe global hypokinesis, EF 20%, LVEDP 40-45.   Recommend medical therapy for now, given IV lasix after procedure. On asa and plavix ,. Statin beta blocker     Since EF is low will need aldactone and ACE prior to discharge as well       HTN   Stable , on beta blocker     HLD  Statin     Morbid obesity   counselling started by me   He is very motivated       Code status: full   DVT prophylaxis: Rachelle Cobos discussed with: Patient/Family  Disposition: TBD     Hospital Problems  Date Reviewed: 1/23/2019          Codes Class Noted POA    * (Principal) CVA (cerebral vascular accident) [facility]) ICD-10-CM: I63.9  ICD-9-CM: 434.91  1/23/2019 Yes                Review of Systems:   A comprehensive review of systems was negative except for that written in the HPI. Vital Signs:    Last 24hrs VS reviewed since prior progress note. Most recent are:  Visit Vitals  /90 (BP 1 Location: Right arm, BP Patient Position: At rest)   Pulse 70   Temp 98.5 °F (36.9 °C)   Resp 19   Ht 6' 1.5\" (1.867 m)   Wt 138.5 kg (305 lb 5.4 oz)   SpO2 98%   BMI 39.74 kg/m²         Intake/Output Summary (Last 24 hours) at 1/24/2019 1802  Last data filed at 1/24/2019 1200  Gross per 24 hour   Intake 1350 ml   Output 625 ml   Net 725 ml        Physical Examination:             Constitutional:  No acute distress, cooperative, pleasant    ENT:  Oral mucous moist, oropharynx benign. Neck supple,    Resp:  CTA bilaterally. No wheezing/rhonchi/rales. No accessory muscle use   CV:  Regular rhythm, normal rate, no murmurs, gallops, rubs    GI:  Soft, non distended, non tender. normoactive bowel sounds, no hepatosplenomegaly     Musculoskeletal:  No edema, warm, 2+ pulses throughout    Neurologic:  Moves all extremities.   AAOx3, CN II-XII reviewed  Right upper ext 5/5 , left upper 5/5 , left lowwer and right lower 5/5             Data Review:    Review and/or order of clinical lab test      Labs:     Recent Labs     01/24/19  1230 01/24/19  0201   WBC 7.5 7.1   HGB 15.0 14.8   HCT 46.8 45.7    180     Recent Labs 01/24/19  0201 01/23/19  0140    143   K 4.0 3.7    107   CO2 27 24   BUN 17 17   CREA 0.95 1.10   * 166*   CA 8.6 8.9   MG  --  2.0   PHOS  --  2.6     Recent Labs     01/23/19  0140   SGOT 63*   ALT 69   AP 65   TBILI 0.4   TP 7.7   ALB 3.6   GLOB 4.1*     Recent Labs     01/24/19  1230 01/23/19  0140   INR 1.1  --    PTP 10.9  --    APTT 26.7 27.2      No results for input(s): FE, TIBC, PSAT, FERR in the last 72 hours. No results found for: FOL, RBCF   No results for input(s): PH, PCO2, PO2 in the last 72 hours.   Recent Labs     01/24/19  0201 01/23/19  1907 01/23/19  1353 01/23/19  0830 01/23/19  0140   CPK  --   --  554* 543* 485*   CKNDX  --   --  4.7* 5.6* 1.7   TROIQ 4.17* 8.10* 12.30* 16.30* 0.59*     Lab Results   Component Value Date/Time    Cholesterol, total 191 01/23/2019 08:30 AM    HDL Cholesterol 52 01/23/2019 08:30 AM    LDL, calculated 125 (H) 01/23/2019 08:30 AM    Triglyceride 70 01/23/2019 08:30 AM    CHOL/HDL Ratio 3.7 01/23/2019 08:30 AM     Lab Results   Component Value Date/Time    Glucose (POC) 149 (H) 01/23/2019 01:24 AM    Glucose (POC) 164 (H) 04/10/2012 03:15 AM     No results found for: COLOR, APPRN, SPGRU, REFSG, JET, PROTU, GLUCU, KETU, BILU, UROU, NIMCO, LEUKU, GLUKE, EPSU, BACTU, WBCU, RBCU, CASTS, UCRY      Medications Reviewed:     Current Facility-Administered Medications   Medication Dose Route Frequency    aspirin chewable tablet 81 mg  81 mg Oral DAILY    [START ON 1/25/2019] clopidogrel (PLAVIX) tablet 75 mg  75 mg Oral DAILY    furosemide (LASIX) injection 40 mg  40 mg IntraVENous ONCE    hydrALAZINE (APRESOLINE) 20 mg/mL injection 10 mg  10 mg IntraVENous Q6H PRN    sodium chloride (NS) flush 5-40 mL  5-40 mL IntraVENous Q8H    sodium chloride (NS) flush 5-40 mL  5-40 mL IntraVENous PRN    0.9% sodium chloride infusion  75 mL/hr IntraVENous CONTINUOUS    sodium chloride (NS) flush 5-40 mL  5-40 mL IntraVENous Q8H    sodium chloride (NS) flush 5-40 mL  5-40 mL IntraVENous PRN    acetaminophen (TYLENOL) tablet 650 mg  650 mg Oral Q6H PRN    bisacodyl (DULCOLAX) tablet 5 mg  5 mg Oral DAILY PRN    atorvastatin (LIPITOR) tablet 40 mg  40 mg Oral QHS    carvedilol (COREG) tablet 6.25 mg  6.25 mg Oral BID WITH MEALS    ondansetron (ZOFRAN) injection 4 mg  4 mg IntraVENous Q6H PRN     ______________________________________________________________________  EXPECTED LENGTH OF STAY: 3d 2h  ACTUAL LENGTH OF STAY:          1                 Dangelo Roblero MD

## 2019-01-24 NOTE — PROGRESS NOTES
Orders received, chart reviewed and patient evaluated by occupational therapy. Recommend patient to discharge to [facility] vs. None pending cardiac cath procedure and pending progression with skilled acute occupational therapy. Recommend with nursing patient to complete as able in order to maintain strength, endurance and independence: OOB to chair 3x/day, ADLs with supervision/setup and mobilizing to the bathroom for toileting with 1 assist. Thank you for your assistance. Full evaluation to follow. Dakota Saldana MS, OTR/L

## 2019-01-24 NOTE — PROGRESS NOTES
Cardiac Cath Lab Procedure Area Arrival Note:    Breanne Esquivel arrived to Cardiac Cath Lab, Procedure Area. Patient identifiers verified with NAME and DATE OF BIRTH. Procedure verified with patient. Consent forms verified. Allergies verified. Patient informed of procedure and plan of care. Questions answered with review. Patient voiced understanding of procedure and plan of care. Patient on cardiac monitor, non-invasive blood pressure, SPO2 monitor. On RA and placed on O2 @ 2 lpm via NC.  IV of NSS on pump at 25 ml/hr per FRANCK protocol. Patient status doing well without problems. Patient is A&Ox 4. Patient reports no complaints of pain or shortness of breath. Patient medicated during procedure with orders obtained and verified by Dr. Cristian Godfrey. Refer to patients Cardiac Cath Lab PROCEDURE REPORT for vital signs, assessment, status, and response during procedure, printed at end of case. Printed report on chart or scanned into chart. 1415 TRANSFER - OUT REPORT:    Verbal report given to FENG Gonzalez(name) on Breanne Esquivel  being transferred to ICU(unit) for routine progression of care       Report consisted of patients Situation, Background, Assessment and   Recommendations(SBAR). Information from the following report(s) SBAR, Procedure Summary and MAR was reviewed with the receiving nurse. Lines:   Peripheral IV 01/23/19 Left Hand (Active)   Site Assessment Clean, dry, & intact 1/24/2019 12:00 PM   Phlebitis Assessment 0 1/24/2019 12:00 PM   Infiltration Assessment 0 1/24/2019 12:00 PM   Dressing Status Clean, dry, & intact 1/24/2019 12:00 PM   Dressing Type Transparent 1/24/2019 12:00 PM   Hub Color/Line Status Pink; Infusing 1/24/2019 12:00 PM   Action Taken Open ports on tubing capped 1/24/2019 12:00 PM   Alcohol Cap Used Yes 1/24/2019 12:00 PM        Opportunity for questions and clarification was provided.       Patient transported with:   Monitor  Registered Nurse  Tech

## 2019-01-25 LAB
ANION GAP SERPL CALC-SCNC: 7 MMOL/L (ref 5–15)
BUN SERPL-MCNC: 19 MG/DL (ref 6–20)
BUN/CREAT SERPL: 19 (ref 12–20)
CALCIUM SERPL-MCNC: 8.7 MG/DL (ref 8.5–10.1)
CHLORIDE SERPL-SCNC: 107 MMOL/L (ref 97–108)
CO2 SERPL-SCNC: 28 MMOL/L (ref 21–32)
CREAT SERPL-MCNC: 0.99 MG/DL (ref 0.7–1.3)
GLUCOSE SERPL-MCNC: 112 MG/DL (ref 65–100)
POTASSIUM SERPL-SCNC: 4.2 MMOL/L (ref 3.5–5.1)
SODIUM SERPL-SCNC: 142 MMOL/L (ref 136–145)

## 2019-01-25 PROCEDURE — 74011250637 HC RX REV CODE- 250/637: Performed by: SPECIALIST

## 2019-01-25 PROCEDURE — 77010033678 HC OXYGEN DAILY

## 2019-01-25 PROCEDURE — 74011250637 HC RX REV CODE- 250/637: Performed by: PHYSICIAN ASSISTANT

## 2019-01-25 PROCEDURE — 74011250637 HC RX REV CODE- 250/637: Performed by: INTERNAL MEDICINE

## 2019-01-25 PROCEDURE — 65660000000 HC RM CCU STEPDOWN

## 2019-01-25 PROCEDURE — 80048 BASIC METABOLIC PNL TOTAL CA: CPT

## 2019-01-25 PROCEDURE — 36415 COLL VENOUS BLD VENIPUNCTURE: CPT

## 2019-01-25 PROCEDURE — 97116 GAIT TRAINING THERAPY: CPT

## 2019-01-25 RX ORDER — CARVEDILOL 6.25 MG/1
6.25 TABLET ORAL 2 TIMES DAILY WITH MEALS
Qty: 90 TAB | Refills: 0 | Status: SHIPPED | OUTPATIENT
Start: 2019-01-25 | End: 2019-03-01 | Stop reason: SDUPTHER

## 2019-01-25 RX ORDER — ATORVASTATIN CALCIUM 40 MG/1
40 TABLET, FILM COATED ORAL
Qty: 90 TAB | Refills: 0 | Status: SHIPPED | OUTPATIENT
Start: 2019-01-25 | End: 2019-05-03 | Stop reason: SDUPTHER

## 2019-01-25 RX ORDER — GUAIFENESIN 100 MG/5ML
81 LIQUID (ML) ORAL DAILY
Qty: 90 TAB | Refills: 0 | Status: SHIPPED | OUTPATIENT
Start: 2019-01-26 | End: 2019-05-03 | Stop reason: SDUPTHER

## 2019-01-25 RX ORDER — CLOPIDOGREL BISULFATE 75 MG/1
75 TABLET ORAL DAILY
Qty: 90 TAB | Refills: 0 | Status: SHIPPED | OUTPATIENT
Start: 2019-01-26 | End: 2019-02-04 | Stop reason: ALTCHOICE

## 2019-01-25 RX ADMIN — ATORVASTATIN CALCIUM 40 MG: 40 TABLET, FILM COATED ORAL at 22:34

## 2019-01-25 RX ADMIN — CARVEDILOL 6.25 MG: 6.25 TABLET, FILM COATED ORAL at 16:57

## 2019-01-25 RX ADMIN — CLOPIDOGREL BISULFATE 75 MG: 75 TABLET ORAL at 09:39

## 2019-01-25 RX ADMIN — Medication 10 ML: at 13:08

## 2019-01-25 RX ADMIN — Medication 10 ML: at 05:57

## 2019-01-25 RX ADMIN — ASPIRIN 81 MG CHEWABLE TABLET 81 MG: 81 TABLET CHEWABLE at 09:39

## 2019-01-25 RX ADMIN — Medication 10 ML: at 22:34

## 2019-01-25 RX ADMIN — CARVEDILOL 6.25 MG: 6.25 TABLET, FILM COATED ORAL at 09:39

## 2019-01-25 RX ADMIN — Medication 10 ML: at 13:07

## 2019-01-25 NOTE — PROGRESS NOTES
Neurology Progress Note  Nick Lorene NP-C  Admit Date: 1/23/2019   LOS: 2 days        Daily Progress Note: 1/25/2019      58year old male with h/o HTN. HLD, obesity, CAD, s/p MI and stenting 2012, non-adherence to medications. Admitted 1/23/19 with left face, arm, leg weakness. Head CT without acute abnormality. CTA/CTP with atherosclerotic calcification origin dominant left vertebral artery with associated stenosis, chronic calcification carotid bifurcations, Abrupt occlusion distal right MCA M3 branches corresponding to area of perfusion deficit. Given tPA in the ED, delayed repeat neuroimaging with MRI on 1/24 showed large acute ischemia in the right cerebellar hemisphere, small acute ischemia in the cortex and white matter of right parietal lobe, chronic small vessel ischemic white matter changes. , A1c 7.0. Elevated troponin and TTE with LVEF 25-30%, cardiology took him for catheterization 1/24. LV dilated with apical akinesis and severe global hypokinesis, EF 20% per procedure note. Recommended dual antiplatelets and started him on anti-hypertensives, BP 94/58 O/N on 1/24 and Dr. More Zhou asked to maintain permissive HTN. Subjective:     Patient reports that he feels well. Denies headache, numbness, tingling, weakness, vision changes, speech issues, cognitive issues.     Current Facility-Administered Medications   Medication Dose Route Frequency Provider Last Rate Last Dose    aspirin chewable tablet 81 mg  81 mg Oral DAILY Renee Olivares MD   81 mg at 01/24/19 0849    clopidogrel (PLAVIX) tablet 75 mg  75 mg Oral DAILY Charity Jackson MD        hydrALAZINE (APRESOLINE) 20 mg/mL injection 10 mg  10 mg IntraVENous Q6H PRN Herson De Paz MD        sodium chloride (NS) flush 5-40 mL  5-40 mL IntraVENous Q8H Maryana Costa MD   10 mL at 01/25/19 0557    sodium chloride (NS) flush 5-40 mL  5-40 mL IntraVENous PRN Maryana Costa MD        0.9% sodium chloride infusion  75 mL/hr IntraVENous CONTINUOUS Stevie Gee MD   Stopped at 19 1318    sodium chloride (NS) flush 5-40 mL  5-40 mL IntraVENous Q8H Duane Huynh MD   10 mL at 19 0557    sodium chloride (NS) flush 5-40 mL  5-40 mL IntraVENous PRN Duane Huynh MD        acetaminophen (TYLENOL) tablet 650 mg  650 mg Oral Q6H PRN Duane Huynh MD        bisacodyl (DULCOLAX) tablet 5 mg  5 mg Oral DAILY PRN Duane Huynh MD        atorvastatin (LIPITOR) tablet 40 mg  40 mg Oral QHS Duane Huynh MD   40 mg at 190    carvedilol (COREG) tablet 6.25 mg  6.25 mg Oral BID WITH MEALS Rajan Peguero PA-C   6.25 mg at 19    ondansetron (ZOFRAN) injection 4 mg  4 mg IntraVENous Q6H PRN Maria E Acuna PA            No Known Allergies    Review of Systems:  Pertinent items are noted in the History of Present Illness. Objective:     Vital signs  Temp (24hrs), Av.4 °F (36.9 °C), Min:97.7 °F (36.5 °C), Max:98.8 °F (37.1 °C)   No intake/output data recorded.  1901 -  0700  In: 4213 [P.O.:120;  I.V.:1275]  Out: 1025 [Urine:1025]    Visit Vitals  BP (!) 154/95 (BP 1 Location: Right arm, BP Patient Position: At rest)   Pulse 64   Temp 98.6 °F (37 °C)   Resp 16   Ht 6' 1.5\" (1.867 m)   Wt 138.8 kg (305 lb 16 oz)   SpO2 97%   BMI 39.82 kg/m²    O2 Flow Rate (L/min): 2 l/min O2 Device: Room air     Pain control  Pain Assessment  Pain Scale 1: Numeric (0 - 10)  Pain Intensity 1: 0    PT/OT  Gait     Gait  Base of Support: Widened  Speed/Kimberly: Pace decreased (<100 feet/min)  Gait Abnormalities: Decreased step clearance, Trunk sway increased  Ambulation - Level of Assistance: Stand-by assistance, Contact guard assistance  Distance (ft): 40 Feet (ft)  Assistive Device: Gait belt             Vitals:    19 0500 19 0600 19 0700 19 0800   BP: 118/74 131/64 (!) 152/92 (!) 154/95   Pulse: (!) 58 66 64 64   Resp: 18 20 18 16   Temp:    98.6 °F (37 °C)   SpO2: 99% 94% 96% 97% Weight:       Height:            Neurologic Exam:  Mental Status:  Alert and oriented x 4. Appropriate affect, mood and behavior. Language:    Normal fluency, comprehension and naming. Cranial Nerves:   Pupils equal, round and reactive to light. Vision grossly intact     Extraocular movements intact. Facial sensation intact. Full facial strength, no asymmetry. Hearing intact grossly. No dysarthria. Tongue protrudes to midline. Shoulder shrug 5/5 bilaterally. Motor:    No pronator drift. Bulk and tone normal.      5/5 power in all extremities proximally and distally. No involuntary movements. Sensation:    Sensation intact and equal throughout to light touch    Coordination & Gait: Gait testing deferred.   Normal FTN, HTS, rapid alternating movements bilaterally        24 hour results:    Recent Results (from the past 24 hour(s))   CBC W/O DIFF    Collection Time: 01/24/19 12:30 PM   Result Value Ref Range    WBC 7.5 4.1 - 11.1 K/uL    RBC 4.51 4.10 - 5.70 M/uL    HGB 15.0 12.1 - 17.0 g/dL    HCT 46.8 36.6 - 50.3 %    .8 (H) 80.0 - 99.0 FL    MCH 33.3 26.0 - 34.0 PG    MCHC 32.1 30.0 - 36.5 g/dL    RDW 13.2 11.5 - 14.5 %    PLATELET 041 804 - 774 K/uL    MPV 10.7 8.9 - 12.9 FL    NRBC 0.0 0  WBC    ABSOLUTE NRBC 0.00 0.00 - 0.01 K/uL   PTT    Collection Time: 01/24/19 12:30 PM   Result Value Ref Range    aPTT 26.7 22.1 - 32.0 sec    aPTT, therapeutic range     58.0 - 77.0 SECS   PROTHROMBIN TIME + INR    Collection Time: 01/24/19 12:30 PM   Result Value Ref Range    INR 1.1 0.9 - 1.1      Prothrombin time 10.9 9.0 - 20.2 sec   METABOLIC PANEL, BASIC    Collection Time: 01/25/19  3:56 AM   Result Value Ref Range    Sodium 142 136 - 145 mmol/L    Potassium 4.2 3.5 - 5.1 mmol/L    Chloride 107 97 - 108 mmol/L    CO2 28 21 - 32 mmol/L    Anion gap 7 5 - 15 mmol/L    Glucose 112 (H) 65 - 100 mg/dL    BUN 19 6 - 20 MG/DL    Creatinine 0.99 0.70 - 1.30 MG/DL    BUN/Creatinine ratio 19 12 - 20      GFR est AA >60 >60 ml/min/1.73m2    GFR est non-AA >60 >60 ml/min/1.73m2    Calcium 8.7 8.5 - 10.1 MG/DL          Imaging:  MRI Results (most recent):  Results from Hospital Encounter encounter on 01/23/19   MRI BRAIN WO CONT    Narrative EXAM:  MRI BRAIN WO CONT    INDICATION:  CVA    COMPARISON: CT yesterday. CONTRAST:  None. TECHNIQUE: Sagittal T1, axial FLAIR, T2,T1 and gradient echo images as well as  coronal T2 weighted images and axial diffusion weighted images of the head were  obtained. FINDINGS: The ventricular size and configuration are normal. There are are  rounded areas of mildly increased signal with T2-weighting in the medial portion  of the right cerebellar hemisphere inferiorly. In addition there are foci of  hyperintensity in periventricular white matter both cerebral hemispheres. Finally there is also an area of increased T2 signal in the cortex of a right  parietal lobe gyrus. There is no evidence of mass or hemorrhage or extra-axial  collection. Diffusion scanning demonstrates extensive diffusion abnormalities in  the inferior right cerebellar hemisphere consistent with areas of acute  ischemia. In addition the cortical ribbon in the right parietal lobe previously  described as abnormal T2 signal also demonstrates diffusion abnormality as well  as punctate focus within the right parietal white matter both at this level and  more inferiorly. These findings are consistent with areas of acute ischemic  change as well. Normal appearing flow-voids are present in the vertebral,  basilar and carotid artery systems. The craniocervical junction is normal.  Inflammatory changes noted in the base of the left maxillary sinus. Impression IMPRESSION:   1. There are large areas of acute ischemic change in the right cerebellar  hemisphere. 2. Small foci of acute ischemic change noted in the cortex and white matter of  the right parietal lobe.   3. White matter findings in both cerebral hemispheres adjacent to the ventricles  consistent with small vessel ischemic change which is most likely chronic. Assessment:     Principal Problem:    CVA (cerebral vascular accident) ([facility] [address] (1/23/2019)        Plan:     28-year-old male admitted 1/23/19 with left-sided weakness. Head CT unremarkable, received TPA in the ED. Troponin was elevated, TTE showed LVEF 25-30% and he was taken for cardiac catheterization on 1/24/2019. LVEF 20% with severe diffuse hypokinesis. Antihypertensives started, but goal is to maintain permissive hypertension still. Also started on aspirin and Plavix by cardiology. MRI concerning for cardioembolic stroke. Exam unremarkable today, no focal deficits appreciated.     Continue ASA and Plavix  Discussed MRI findings with Dr. Cirilo Garcia, she plans to discuss possibly starting anticoagulation with cardiology  PT/OT/speech  Continue permissive hypertension for 1 week post stroke, then okay to treat per CHF guidelines  Continue statin      Plan d/w Dr. Redd Carney, NP

## 2019-01-25 NOTE — PROGRESS NOTES
TRANSFER - OUT REPORT:    Verbal report given to toya(name) on Bailey Chambers  being transferred to nstu(unit) for routine progression of care       Report consisted of patients Situation, Background, Assessment and   Recommendations(SBAR). Information from the following report(s) SBAR, Kardex and MAR was reviewed with the receiving nurse. Lines:   Peripheral IV 01/23/19 Left Hand (Active)   Site Assessment Clean, dry, & intact 1/25/2019  4:00 PM   Phlebitis Assessment 0 1/25/2019  4:00 PM   Infiltration Assessment 0 1/25/2019  4:00 PM   Dressing Status Clean, dry, & intact 1/25/2019  4:00 PM   Dressing Type Transparent 1/25/2019  4:00 PM   Hub Color/Line Status Pink;Capped 1/25/2019  4:00 PM   Action Taken Open ports on tubing capped 1/25/2019  4:00 PM   Alcohol Cap Used Yes 1/25/2019  4:00 PM        Opportunity for questions and clarification was provided.       Patient transported with:   Registered Nurse

## 2019-01-25 NOTE — CDMP QUERY
Patient admitted with CVA, noted to have asymptomatic elevated troponin of 16.3. If possible, please document in progress notes and d/c summary if you are evaluating and/or treating any of the following: 
 
=> Type 2 MI (POA) 
=> NSTEMI (POA) 
=> Other Explanation of clinical findings 
=> Clinically Undetermined (no explanation for clinical findings) The medical record reflects the following: 
   Risk Factors: CVA, CAD, elevated troponin, morbid obesity, HTN Clinical Indicators: 
Troponin 16.30 ED: 2L via NC due to OSats dropping to 89% on RA; /101 ECHO: Ejection fraction was estimated in the range of 25 % to 30 % CCL note: 
LV dilated with apical akinesis and severe global hypokinesis, EF 20%, Treatment: cardiac cath, supplemental O2, Coreg, at DC Lisinopril, Aldactone Please clarify and document your clinical opinion in the progress notes and discharge summary including the definitive and/or presumptive diagnosis, (suspected or probable), related to the above clinical findings. Please include clinical findings supporting your diagnosis. ? Type 2 (MI secondary to an ischemic imbalance): MI consequent to increased oxygen demand or decreased supply (eg, coronary endothelial dysfunction, coronary artery spasm, coronary artery embolus, tachy-/lacy-arrhythmias, anemia, respiratory failure, hypertension, or hypotension). Reference Martin BURGER, Paty Weaver MD MPH, Theodore Pedraza. (July 5, 2016). Criteria for the Diagnosis of Acute Myocardial Infarction. In UpToDate (Topic  52, Version 36.0). Retrieved from [employer] Thank you, Sandi Dodd, RN 
[phone]

## 2019-01-25 NOTE — PROGRESS NOTES
TRANSFER - IN REPORT:    Verbal report received from rufus(name) on Emmett Varela  being received from S(unit) for routine progression of care      Report consisted of patients Situation, Background, Assessment and   Recommendations(SBAR). Information from the following report(s) SBAR, ED Summary, Recent Results and Cardiac Rhythm nsr-sinus lacy 50's was reviewed with the receiving nurse. Opportunity for questions and clarification was provided. Assessment completed upon patients arrival to unit and care assumed.

## 2019-01-25 NOTE — PROGRESS NOTES
Problem: Pressure Injury - Risk of  Goal: *Prevention of pressure injury  Document Patrice Scale and appropriate interventions in the flowsheet. Outcome: Progressing Towards Goal  Pressure Injury Interventions: Activity Interventions: Pressure redistribution bed/mattress(bed type)    Mobility Interventions: Pressure redistribution bed/mattress (bed type)    Nutrition Interventions: Document food/fluid/supplement intake    Friction and Shear Interventions: HOB 30 degrees or less               Problem: Falls - Risk of  Goal: *Absence of Falls  Document Hunter Fall Risk and appropriate interventions in the flowsheet.   Outcome: Progressing Towards Goal  Fall Risk Interventions:  Mobility Interventions: Communicate number of staff needed for ambulation/transfer         Medication Interventions: Evaluate medications/consider consulting pharmacy, Patient to call before getting OOB    Elimination Interventions: Call light in reach

## 2019-01-25 NOTE — PROGRESS NOTES
Cardiology Progress Note            Admit Date: 1/23/2019  Admit Diagnosis: CVA (cerebral vascular accident) [facility])  Date: 1/25/2019     Time: 10:13 AM    Subjective:  Received OOB to chair. Feeling well today. Denies CP or SOB, palpitations, dizziness or blurry vision     Assessment and Plan     1. Acute CVA              - s/p tPA              - Neurology following              - Plavix and ASA started. 2. CAD              - s/p PCI with JARED to LAD with residual dz - disease in OM1 and OM@ at 60% and RCA at 80% - 2012              - Echo EF 25-30%, severe diffuse hypokinesis with distinct regional wall motion abnormalities                 LA moderate to severe dilation     - s/p Cardiac cath       - Re: LAD widely patent stent, no significant disease. LCX co-dominant, first marginal has multiple high grade lesions including at ostium. Ramus small with tight lesion, 2.0 vessel. RCA small with multiple lesions proximal and mid. LV dilated with apical akinesis and severe global hypokinesis, EF 20%, LVEDP 40-45. - Coreg 6.25 mg BID     - ACE/ARB and Aldactone held for permissive HTN following CVA, x 1 week. May begin after 1 week     - ASA, Plavix and Statin started. 3. HTN              - Coreg 6.25 mg BID     - Permissive HTN  4. HLD   Cholesterol, total 191 01/23/2019 08:30 AM   HDL Cholesterol 52 01/23/2019 08:30 AM   LDL, calculated 125 (H) 01/23/2019 08:30 AM   VLDL, calculated 14 01/23/2019 08:30 AM   Triglyceride 70 01/23/2019 08:30 AM   CHOL/HDL Ratio 3.7 01/23/2019 08:30 AM      - Lipitor 40 mg daily  4. Body mass index is 41.55 kg/m². - Morbid obesity - discussed weight loss and dietary changes    Progression of CAD. Medical management for now. ASA, Plavix and Coreg. Also with ICM, EF 20%.   Coreg for now, permissive HTN  following CVA, so ACE/ARB and Spironolactone held for 1 week. Most likely with start Entresto in place of ACE/ARB. Also will discuss LifeVest prior to discharge. Reviewed Neurology progress note. They are considering starting anticoagulation based on MRI findings. Cardiology OK if anticoagulation started. Would just d/c Plavix and continue on ASA, so as not to be on triple therapy. Cardiology attending: seen and examined. Agree with assess and plan  No complaints. Exam unchanged. Well compensated at present, long term will need gdmt once concerns about bp have passed. No further cardiac testing at this time.  recs regarding oacs as above, no indication from cardiac standpoint thus far        ROS:     GENERAL   Recent weight loss - no   Fever -----------------   no   Chills -----------------   no     EYES, VISION   Visual Changes - no     EARS, NOSE, THROAT   Hearing loss ----------- no   Swallowing difficulties - no     CARDIOVASCULAR   Chest pain/pressure ---- no   Arrhythmia/palpitations - no       RESPIRATORY   Cough ------------------ no   Shortness of breath - no   Wheezing -------------- no   GASTROINTESTINAL   Abdominal pain - no   Heartburn -------- no   Bloody stool ----- no     GENITOURINARY   Frequent urination - no   Urgency -------------- no     MUSCULOSKELETAL   Joint pain/swelling ---- no   Musculoskeletal pain - no     SKIN & INTEGUMENTARY   Rashes - no   Sores --- no         NEUROLOGICAL   Numbness/tingling - no   Sensation loss ------ no     PSYCHIATRIC   Nervousness/anxiety - no   Depression -------------- no     ENDOCRINE   Heat/cold intolerance - no   Excessive thirst -------- no     HEMATOLOGIC/LYMPHATIC   Abnormal bleeding - no     ALL/IMMUN   Allergic reaction ------ no   Recurrent infections - no     Objective:     Physical Exam:                Visit Vitals  /83   Pulse 66   Temp 98.6 °F (37 °C)   Resp 14   Ht 6' 1.5\" (1.867 m)   Wt 305 lb 16 oz (138.8 kg)   SpO2 90% BMI 39.82 kg/m²        General Appearance:   Well developed, well nourished,alert and oriented x 3, and   individual in no acute distress. Ears/Nose/Mouth/Throat:    Hearing grossly normal.         Neck:  Supple. Chest:    Lungs clear to auscultation bilaterally. Cardiovascular:   Regular rate and rhythm, S1, S2 normal, no murmur. Abdomen:    Soft, non-tender, bowel sounds are active. Extremities:  1+ edema bilaterally. Skin:  Warm and dry.      Telemetry: normal sinus rhythm          Data Review:    Labs:    Recent Results (from the past 24 hour(s))   CBC W/O DIFF    Collection Time: 01/24/19 12:30 PM   Result Value Ref Range    WBC 7.5 4.1 - 11.1 K/uL    RBC 4.51 4.10 - 5.70 M/uL    HGB 15.0 12.1 - 17.0 g/dL    HCT 46.8 36.6 - 50.3 %    .8 (H) 80.0 - 99.0 FL    MCH 33.3 26.0 - 34.0 PG    MCHC 32.1 30.0 - 36.5 g/dL    RDW 13.2 11.5 - 14.5 %    PLATELET 738 655 - 510 K/uL    MPV 10.7 8.9 - 12.9 FL    NRBC 0.0 0  WBC    ABSOLUTE NRBC 0.00 0.00 - 0.01 K/uL   PTT    Collection Time: 01/24/19 12:30 PM   Result Value Ref Range    aPTT 26.7 22.1 - 32.0 sec    aPTT, therapeutic range     58.0 - 77.0 SECS   PROTHROMBIN TIME + INR    Collection Time: 01/24/19 12:30 PM   Result Value Ref Range    INR 1.1 0.9 - 1.1      Prothrombin time 10.9 9.0 - 11.4 sec   METABOLIC PANEL, BASIC    Collection Time: 01/25/19  3:56 AM   Result Value Ref Range    Sodium 142 136 - 145 mmol/L    Potassium 4.2 3.5 - 5.1 mmol/L    Chloride 107 97 - 108 mmol/L    CO2 28 21 - 32 mmol/L    Anion gap 7 5 - 15 mmol/L    Glucose 112 (H) 65 - 100 mg/dL    BUN 19 6 - 20 MG/DL    Creatinine 0.99 0.70 - 1.30 MG/DL    BUN/Creatinine ratio 19 12 - 20      GFR est AA >60 >60 ml/min/1.73m2    GFR est non-AA >60 >60 ml/min/1.73m2    Calcium 8.7 8.5 - 10.1 MG/DL          Radiology:        Current Facility-Administered Medications   Medication Dose Route Frequency    aspirin chewable tablet 81 mg  81 mg Oral DAILY    clopidogrel (PLAVIX) tablet 75 mg  75 mg Oral DAILY    hydrALAZINE (APRESOLINE) 20 mg/mL injection 10 mg  10 mg IntraVENous Q6H PRN    sodium chloride (NS) flush 5-40 mL  5-40 mL IntraVENous Q8H    sodium chloride (NS) flush 5-40 mL  5-40 mL IntraVENous PRN    0.9% sodium chloride infusion  75 mL/hr IntraVENous CONTINUOUS    sodium chloride (NS) flush 5-40 mL  5-40 mL IntraVENous Q8H    sodium chloride (NS) flush 5-40 mL  5-40 mL IntraVENous PRN    acetaminophen (TYLENOL) tablet 650 mg  650 mg Oral Q6H PRN    bisacodyl (DULCOLAX) tablet 5 mg  5 mg Oral DAILY PRN    atorvastatin (LIPITOR) tablet 40 mg  40 mg Oral QHS    carvedilol (COREG) tablet 6.25 mg  6.25 mg Oral BID WITH MEALS    ondansetron (ZOFRAN) injection 4 mg  4 mg IntraVENous Q6H PRN       Mary Gonsalves. Rajat Pyle M.D.      [facility] [address] [place] [address] 13, [address] [address]   [address]   [phone]

## 2019-01-25 NOTE — ROUTINE PROCESS
1930: Bedside and Verbal shift change report given to [address] (oncoming nurse) by Kerri Rojo (offgoing nurse). Report included the following information SBAR, Kardex, ED Summary, Intake/Output, MAR, Accordion, Recent Results and Cardiac Rhythm NSR.

## 2019-01-25 NOTE — PROGRESS NOTES
physical Therapy TREATMENT/DISCHARGE  Patient: [facility] (45 y.o. male)  Date: 1/25/2019  Diagnosis: CVA (cerebral vascular accident) [facility]) CVA (cerebral vascular accident) [facility])      Precautions: Fall  Chart, physical therapy assessment, plan of care and goals were reviewed. ASSESSMENT:  Pt received seated in chair and agreeable to therapy. Pt eager to mobilize and return home. Pt completed sit<>stand independently without AD. Pt performed standing balance activities without LOB or instability. Pt ambulated around the ICU unit with supervision/standby assist. Pt demonstrated fair gait speed, wide GERMAIN, no LOB or gait abnormalities. Pt does not require any further acute PT needs at this time. Recommend pt remain OOB to chair for all meals, ambulate with RN staff to prevent deconditioning during remaining hospital stay. Progression toward goals:  [x]      Improving appropriately and progressing toward goals  []      Improving slowly and progressing toward goals  []      Not making progress toward goals and plan of care will be adjusted     PLAN:  Patient will be discharged from acute skilled physical therapy at this time. Rationale for discharge:  [x] Goals Achieved  [] [address]  [] Patient not participating in therapy  [] Other:  Discharge Recommendations:  None  Further Equipment Recommendations for Discharge:  none     SUBJECTIVE:   Patient stated I would like to just go home from here.     OBJECTIVE DATA SUMMARY:   Critical Behavior:  Neurologic State: Alert, Appropriate for age, Eyes open spontaneously  Orientation Level: Oriented X4  Cognition: Appropriate decision making, Appropriate for age attention/concentration, Appropriate safety awareness, Follows commands  Safety/Judgement: Awareness of environment, Fall prevention, Insight into deficits  Functional Mobility Training:  Bed Mobility:      NT--pt received and returned seated in chair        Transfers:  Sit to Stand: Independent  Stand to Sit: Independent           Balance:  Sitting: Intact  Standing: Intact  Ambulation/Gait Training:  Distance (ft): 500 Feet (ft)  Assistive Device: Gait belt  Ambulation - Level of Assistance: Stand-by assistance;Supervision        Gait Abnormalities: Decreased step clearance        Base of Support: Widened     Speed/Kimberly: Pace decreased (<100 feet/min)       Therapeutic Exercises:   Tandem stance without LOB  Static standing with Eyes Closed without LOB  Static standing with narrow GERMAIN without LOB  Pain:  Pain Scale 1: Numeric (0 - 10)  Pain Intensity 1: 0              Activity Tolerance:   Good. VSS  Please refer to the flowsheet for vital signs taken during this treatment.   After treatment:   [x] Patient left in no apparent distress sitting up in chair  [] Patient left in no apparent distress in bed  [x] Call bell left within reach  [x] Nursing notified  [] Caregiver present  [] Bed alarm activated    COMMUNICATION/COLLABORATION:   The patients plan of care was discussed with: Occupational Therapist and Registered Nurse    Sydni Card PT, DPT   Time Calculation: 10 mins

## 2019-01-25 NOTE — PROGRESS NOTES
CM noted order for rehab-- Patient is self pay and will need to complete financial application for inpatient rehab. Nikolay Chavez to evaluate and CM will make referral when recommendations made. UPDATE 11:25 am  PT evaluated patient and discharged him. . No home health services recommended      Patient is being transferred out of ICU today. CM will follow and assist with any needs that arise.

## 2019-01-25 NOTE — PROGRESS NOTES
Hospitalist Progress Note  Fabian Salas MD  Answering service: [phone] OR [phone] from in house phone        Date of Service:  2019  NAME:  Donna Cesar  :  1955  MRN:  [id number]      Admission Summary:   65-year-old man with a past medical history significant for coronary artery disease status post stent placement, hypertension, dyslipidemia, morbid obesity who was in his usual state of health until the day of presentation at the emergency room when the patient developed sudden onset of left-sided facial droop. It was reported that the patient was last known well at about 9:30 yesterday evening. The patient went to bed around that time. Forty minutes later, the patient woke up to use the bathroom and realized that the left side of his body was weak. He called his wife on the phone who was in another room sleeping, and the wife came to the room and found that the patient has left facial droop in addition to the left-sided weakness. Patient was brought to the emergency room for further evaluation        Interval history / Subjective:     Patient seen and examined and no focal neuro deficit   He is doing good     'neuro planning for [facility] in a week as it is embolic stroke   He needs life vest , which we are arranging now     No new problems   Tolerating his meds    I had extensive discussion with MARCELO Navarro and Dr Asher Marin and how lifevest and his [facility] will benefit him . Assessment & Plan:       Acute stroke s/p TPA   Post 24 hour window of tpa , stable and no neuro deficit   Neurology wants to continue permissive HTN   ON Beta blocker , asa and plavix for now  CTA reviwed , no intervention needed     MRI There are large areas of acute ischemic change in the right cerebellar  hemisphere. 2. Small foci of acute ischemic change noted in the cortex and white matter of  the right parietal lobe.    Stroke looks embolic   No history of afib I discussed with Dr Frances Mcgraw need SOLITARIO as he will get anticoagulated despite negative results     CAD s/p MI and stenting in 2012,  S/p cath   LAD widely patent stent, no significant disease. LCX co-dominant, first marginal has multiple high grade lesions including at ostium. Ramus small with tight lesion, 2.0 vessel. RCA small with multiple lesions proximal and mid. LV dilated with apical akinesis and severe global hypokinesis, EF 20%, LVEDP 40-45. Recommend medical therapy for now, given IV lasix after procedure. On asa and plavix ,. Statin beta blocker     Since EF is low will need aldactone and ACE but held due to hypotension and would need same in a week   Hoping to start in a week by cards outpatient       HTN   Stable , on beta blocker , on permissive side per neurology     HLD  Statin     Morbid obesity   counselling started by me   He is very motivated   Wife is a big support     Can be discharged once life vest is arranged and start eliquis in a week     Code status: full   DVT prophylaxis: Rachelle Cobos discussed with: Patient/Family  Disposition: TBD     Hospital Problems  Date Reviewed: 1/23/2019          Codes Class Noted POA    * (Principal) CVA (cerebral vascular accident) [facility]) ICD-10-CM: I63.9  ICD-9-CM: 434.91  1/23/2019 Yes                Review of Systems:   A comprehensive review of systems was negative except for that written in the HPI. Vital Signs:    Last 24hrs VS reviewed since prior progress note.  Most recent are:  Visit Vitals  BP (!) 113/97   Pulse 63   Temp 98.6 °F (37 °C)   Resp 19   Ht 6' 1.5\" (1.867 m)   Wt 138.8 kg (305 lb 16 oz)   SpO2 92%   BMI 39.82 kg/m²         Intake/Output Summary (Last 24 hours) at 1/25/2019 1615  Last data filed at 1/24/2019 2120  Gross per 24 hour   Intake 120 ml   Output 700 ml   Net -580 ml        Physical Examination:             Constitutional:  No acute distress, cooperative, pleasant    ENT:  Oral mucous moist, oropharynx benign. Neck supple,    Resp:  CTA bilaterally. No wheezing/rhonchi/rales. No accessory muscle use   CV:  Regular rhythm, normal rate, no murmurs, gallops, rubs    GI:  Soft, non distended, non tender. normoactive bowel sounds, no hepatosplenomegaly     Musculoskeletal:  No edema, warm, 2+ pulses throughout    Neurologic:  Moves all extremities. AAOx3, CN II-XII reviewed  Right upper ext 5/5 , left upper 5/5 , left lowwer and right lower 5/5             Data Review:    Review and/or order of clinical lab test      Labs:     Recent Labs     01/24/19  1230 01/24/19  0201   WBC 7.5 7.1   HGB 15.0 14.8   HCT 46.8 45.7    180     Recent Labs     01/25/19  0356 01/24/19  0201 01/23/19  0140    141 143   K 4.2 4.0 3.7    106 107   CO2 28 27 24   BUN 19 17 17   CREA 0.99 0.95 1.10   * 151* 166*   CA 8.7 8.6 8.9   MG  --   --  2.0   PHOS  --   --  2.6     Recent Labs     01/23/19  0140   SGOT 63*   ALT 69   AP 65   TBILI 0.4   TP 7.7   ALB 3.6   GLOB 4.1*     Recent Labs     01/24/19  1230 01/23/19  0140   INR 1.1  --    PTP 10.9  --    APTT 26.7 27.2      No results for input(s): FE, TIBC, PSAT, FERR in the last 72 hours. No results found for: FOL, RBCF   No results for input(s): PH, PCO2, PO2 in the last 72 hours.   Recent Labs     01/24/19  0201 01/23/19  1907 01/23/19  1353 01/23/19  0830 01/23/19  0140   CPK  --   --  554* 543* 485*   CKNDX  --   --  4.7* 5.6* 1.7   TROIQ 4.17* 8.10* 12.30* 16.30* 0.59*     Lab Results   Component Value Date/Time    Cholesterol, total 191 01/23/2019 08:30 AM    HDL Cholesterol 52 01/23/2019 08:30 AM    LDL, calculated 125 (H) 01/23/2019 08:30 AM    Triglyceride 70 01/23/2019 08:30 AM    CHOL/HDL Ratio 3.7 01/23/2019 08:30 AM     Lab Results   Component Value Date/Time    Glucose (POC) 149 (H) 01/23/2019 01:24 AM    Glucose (POC) 164 (H) 04/10/2012 03:15 AM     No results found for: COLOR, APPRN, SPGRU, REFSG, JET, PROTU, GLUCU, KETU, BILU, UROU, NIMCO, [facility] GLUKE, EPSU, BACTU, WBCU, RBCU, CASTS, UCRY      Medications Reviewed:     Current Facility-Administered Medications   Medication Dose Route Frequency    aspirin chewable tablet 81 mg  81 mg Oral DAILY    clopidogrel (PLAVIX) tablet 75 mg  75 mg Oral DAILY    hydrALAZINE (APRESOLINE) 20 mg/mL injection 10 mg  10 mg IntraVENous Q6H PRN    sodium chloride (NS) flush 5-40 mL  5-40 mL IntraVENous Q8H    sodium chloride (NS) flush 5-40 mL  5-40 mL IntraVENous PRN    sodium chloride (NS) flush 5-40 mL  5-40 mL IntraVENous Q8H    sodium chloride (NS) flush 5-40 mL  5-40 mL IntraVENous PRN    acetaminophen (TYLENOL) tablet 650 mg  650 mg Oral Q6H PRN    bisacodyl (DULCOLAX) tablet 5 mg  5 mg Oral DAILY PRN    atorvastatin (LIPITOR) tablet 40 mg  40 mg Oral QHS    carvedilol (COREG) tablet 6.25 mg  6.25 mg Oral BID WITH MEALS    ondansetron (ZOFRAN) injection 4 mg  4 mg IntraVENous Q6H PRN     ______________________________________________________________________  EXPECTED LENGTH OF STAY: 3d 2h  ACTUAL LENGTH OF STAY:          2                 Stanton Rodas MD

## 2019-01-25 NOTE — CDMP QUERY
Please clarify if this patient is (was) being treated/managed for:  
 
=> Acute Systolic CHF (POA) in setting of EF 20% requiring cardiac cath/Lasix/Aldactone/Lisinopril/Coreg. 
=> Acute on chronic Systolic CHF (POA) in setting of EF 20% requiring cardiac cath/Lasix/Aldactone/Lisinopril/Coreg. 
=> Chronic Systolic CHF (POA) without exacerbation in setting of EF 20% requiring cardiac cath/Lasix/Aldactone/Lisinopril/Coreg. 
=> Other explanation of clinical findings 
=> Clinically Undetermined (no explanation for clinical findings) The medical record reflects the following clinical findings, treatment, and risk factors. Risk Factors: EF 20%; morbid obesity; HTN; CAD Clinical Indicators:   
1/24 CCL note: LV dilated with apical akinesis and severe global hypokinesis, EF 20%, given IV lasix after procedure 1/24 Neuro: Will d/c Lasix, Spironolactone, and Lisinopril and continue only Coreg at current dose. These medications can be restarted per CHF guidelines in one week 1/24 HPN: Since EF is low will need aldactone and ACE prior to discharge as well ECHO: Ejection fraction was estimated in the range of 25 % to 30 % Treatment: cardiac cath, Lasix x 1 in CL, Coreg, Aldactone, Lisinopril Please clarify and document your clinical opinion in the progress notes and discharge summary including the definitive and/or presumptive diagnosis, (suspected or probable), related to the above clinical findings. Please include clinical findings supporting your diagnosis. Thank you, Ciaran Westbrook, RN 
[phone]

## 2019-01-25 NOTE — ADVANCED PRACTICE NURSE
[facility] [address] [place] [address] Drive, [address] Justin Patel 
 [phone] Bailey Chambers 1/25/2019 
4:01 PM 
 
Assessment:  ICM with EF 25%, dilated LV - Recommendation for LifeVest 
 
CAD -  
 - Elevated troponin to peak of 16 
   - h/o s/p PCI with JARED to LAD with residual dz - disease in OM1 and OM@ at 60% and RCA at 80% - 2012 
            - Echo EF 25-30%, severe diffuse hypokinesis with distinct regional wall motion abnormalities - LV with moderate dilation. LA moderate to severe dilation - s/p Cardiac cath 1/24 
                                    - Re: LAD widely patent stent, no significant disease.  LCX co-dominant, first marginal has multiple high grade lesions including at ostium.   
                                             Ramus small with tight lesion, 2.0 vessel.   
                                             RCA small with multiple lesions proximal and mid.   
                                             LV dilated with apical akinesis and severe global hypokinesis, EF 20%, LVEDP 40-45.   
                        - Coreg 6.25 mg BID 
                        - ACE/ARB and Aldactone held for permissive HTN following CVA, x 1 week. May begin after 1 week - ASA, Plavix and Statin started. D/w patient and wife concerning risks of EF below 35%, dilated LV and ICM. Elevated risk for VT/VF. Indications for LifeVest. 
Both patient and patient's wife agree. Patient to use LifeVest for 90 days, while on GDT medication. Reevaluate EF at that time. If continued EF below 35%, ICD is indicated and recommended.  
 
Cailin Kunz PA-C

## 2019-01-26 PROCEDURE — 94760 N-INVAS EAR/PLS OXIMETRY 1: CPT

## 2019-01-26 PROCEDURE — 74011250637 HC RX REV CODE- 250/637: Performed by: SPECIALIST

## 2019-01-26 PROCEDURE — 74011250637 HC RX REV CODE- 250/637: Performed by: PHYSICIAN ASSISTANT

## 2019-01-26 PROCEDURE — 65660000000 HC RM CCU STEPDOWN

## 2019-01-26 PROCEDURE — 77010033678 HC OXYGEN DAILY

## 2019-01-26 PROCEDURE — 74011250637 HC RX REV CODE- 250/637: Performed by: INTERNAL MEDICINE

## 2019-01-26 RX ADMIN — ASPIRIN 81 MG CHEWABLE TABLET 81 MG: 81 TABLET CHEWABLE at 08:09

## 2019-01-26 RX ADMIN — Medication 10 ML: at 11:48

## 2019-01-26 RX ADMIN — CLOPIDOGREL BISULFATE 75 MG: 75 TABLET ORAL at 08:09

## 2019-01-26 RX ADMIN — CARVEDILOL 6.25 MG: 6.25 TABLET, FILM COATED ORAL at 08:09

## 2019-01-26 RX ADMIN — ATORVASTATIN CALCIUM 40 MG: 40 TABLET, FILM COATED ORAL at 22:13

## 2019-01-26 RX ADMIN — Medication 10 ML: at 22:13

## 2019-01-26 RX ADMIN — Medication 10 ML: at 06:11

## 2019-01-26 NOTE — PROGRESS NOTES
[facility] Progress Note         NAME:  Ming Truong   :   1955   MRN:   [id number]     Assessment/Plan: asymptomatic this am no cp or sob reported   1. Acute CVA              - s/p tPA              - Neurology following              - Plavix and ASA started. 2. CAD              - s/p PCI with JARED to LAD with residual dz - disease in OM1 and OM@ at 60% and RCA at 80% - 2012              - Echo EF 25-30%, severe diffuse hypokinesis with distinct regional wall motion abnormalities                            LA moderate to severe dilation                          - s/p Cardiac cath                                                  - Re: LAD widely patent stent, no significant disease.  LCX co-dominant, first marginal has multiple high grade lesions including at ostium.                                                 Ramus small with tight lesion, 2.0 vessel.                                                 RCA small with multiple lesions proximal and mid.                                                 LV dilated with apical akinesis and severe global hypokinesis, EF 20%, LVEDP 40-45.                            - Coreg 6.25 mg BID                          - ACE/ARB and Aldactone held for permissive HTN following CVA, x 1 week. May begin after 1 week                          - ASA, Plavix and Statin started. 3. HTN              - Coreg 6.25 mg BID                          - Permissive HTN  4.  HLD   Cholesterol, total 191 2019 08:30 AM   HDL Cholesterol 52 2019 08:30 AM   LDL, calculated 125 (H) 2019 08:30 AM   VLDL, calculated 14 2019 08:30 AM   Triglyceride 70 2019 08:30 AM   CHOL/HDL Ratio 3.7 2019 08:30 AM                           - Lipitor 40 mg daily  4. Body mass index is 41.55 kg/m².              - Morbid obesity - discussed weight loss and dietary changes     Progression of CAD. Medical management for now. ASA, Plavix and Coreg. Also with ICM, EF 20%. Coreg for now, permissive HTN  following CVA, so ACE/ARB and Spironolactone held for 1 week. Most likely with start Entresto in place of ACE/ARB. Also will discuss LifeVest prior to discharge. Reviewed Neurology progress note. They are considering starting anticoagulation based on MRI findings. [address] to be started possibly as outpatient as per neuro note ( no oac at this time to avoid hemorrhagic conversion of cva  Continue asa and plavix for now            Subjective:   Cardiac ROS: Patient denies any exertional chest pain, dyspnea, palpitations, syncope, orthopnea, edema or paroxysmal nocturnal dyspnea. Review of Systems:   Pertinent items are noted in the History of Present Illness. Objective:       No intake/output data recorded. 01/24 1901 - 01/26 0700  In: 120 [P.O.:120]  Out: 700 [Urine:700]    Telemetry: normal sinus rhythm    Physical Exam:  Visit Vitals  /84   Pulse 64   Temp 97.4 °F (36.3 °C)   Resp 17   Ht 6' 1.5\" (1.867 m)   Wt 141.1 kg (311 lb 1.1 oz)   SpO2 97%   BMI 40.48 kg/m²       Neck: no JVD  Heart: regular rate and rhythm  Lungs: clear to auscultation bilaterally  Abdomen: soft, non-tender. Bowel sounds normal. No masses,  no organomegaly  Extremities: no edema    Additional comments: None    Care Plan discussed with:    Comments   Patient     Family      RN     Care Manager                    Consultant:        Data Review:     No results for input(s): TNIPOC in the last 72 hours.     No lab exists for component: ITNL   Recent Labs     01/24/19  0201 01/23/19  1907 01/23/19  1353 01/23/19  0830   CPK  --   --  554* 543*   CKMB  --   --  25.9* 30.5*   TROIQ 4.17* 8.10* 12.30* 16.30*     Recent Labs     01/25/19  0356 01/24/19  1230 01/24/19  0201     --  141   K 4.2  --  4.0     --  106   CO2 28  --  27   BUN 19  --  17   CREA 0.99  --  0.95   * --  151*   WBC  --  7.5 7.1   HGB  --  15.0 14.8   HCT  --  46.8 45.7   PLT  --  192 180     Recent Labs     01/24/19  1230   INR 1.1   PTP 10.9   APTT 26.7       Medications reviewed  Current Facility-Administered Medications   Medication Dose Route Frequency    aspirin chewable tablet 81 mg  81 mg Oral DAILY    clopidogrel (PLAVIX) tablet 75 mg  75 mg Oral DAILY    hydrALAZINE (APRESOLINE) 20 mg/mL injection 10 mg  10 mg IntraVENous Q6H PRN    sodium chloride (NS) flush 5-40 mL  5-40 mL IntraVENous Q8H    sodium chloride (NS) flush 5-40 mL  5-40 mL IntraVENous PRN    sodium chloride (NS) flush 5-40 mL  5-40 mL IntraVENous Q8H    sodium chloride (NS) flush 5-40 mL  5-40 mL IntraVENous PRN    acetaminophen (TYLENOL) tablet 650 mg  650 mg Oral Q6H PRN    bisacodyl (DULCOLAX) tablet 5 mg  5 mg Oral DAILY PRN    atorvastatin (LIPITOR) tablet 40 mg  40 mg Oral QHS    carvedilol (COREG) tablet 6.25 mg  6.25 mg Oral BID WITH MEALS    ondansetron (ZOFRAN) injection 4 mg  4 mg IntraVENous Q6H PRN       Data Reviewed: current meds, labs,recent radiology, intake/output/weight and problem list reviewed    Ruperto Cantu MD

## 2019-01-26 NOTE — PROGRESS NOTES
Problem: Pressure Injury - Risk of  Goal: *Prevention of pressure injury  Document Patrice Scale and appropriate interventions in the flowsheet. Outcome: Progressing Towards Goal  Pressure Injury Interventions: Activity Interventions: Increase time out of bed    Mobility Interventions: HOB 30 degrees or less    Nutrition Interventions: Document food/fluid/supplement intake, Offer support with meals,snacks and hydration    Friction and Shear Interventions: HOB 30 degrees or less, Minimize layers               Problem: Falls - Risk of  Goal: *Absence of Falls  Document Hunter Fall Risk and appropriate interventions in the flowsheet.   Outcome: Progressing Towards Goal  Fall Risk Interventions:  Mobility Interventions: Assess mobility with egress test, Communicate number of staff needed for ambulation/transfer         Medication Interventions: Evaluate medications/consider consulting pharmacy, Teach patient to arise slowly    Elimination Interventions: Call light in reach, Toilet paper/wipes in reach, Toileting schedule/hourly rounds

## 2019-01-26 NOTE — PROGRESS NOTES
Hospitalist Progress Note  Raul Duque MD  Answering service: [phone] OR [phone] from in house phone        Date of Service:  2019  NAME:  Ming Truong  :  1955  MRN:  [id number]      Admission Summary:   43-year-old man with a past medical history significant for coronary artery disease status post stent placement, hypertension, dyslipidemia, morbid obesity who was in his usual state of health until the day of presentation at the emergency room when the patient developed sudden onset of left-sided facial droop. It was reported that the patient was last known well at about 9:30 yesterday evening. The patient went to bed around that time. Forty minutes later, the patient woke up to use the bathroom and realized that the left side of his body was weak. He called his wife on the phone who was in another room sleeping, and the wife came to the room and found that the patient has left facial droop in addition to the left-sided weakness. Patient was brought to the emergency room for further evaluation        Interval history / Subjective:     Patient seen and examined and no focal neuro deficit   He is doing good     'neuro planning for [facility] in a week as it is embolic stroke   He needs life vest , which we are arranging now     No new problems   Tolerating his meds    I had extensive discussion with MARCELO Levy and Dr John Rubin and how lifevest and his [facility] will benefit him . :  No acute events . Waiting for life vest. He probably also have RICK as he desturates during night. He will need Sleep study out patient. If BP remains stable to add ACEI at time of discharge. Consider  Eliquis   attime of  Discharge as seems to be embolic stroke per Neurology.        Assessment & Plan:       Acute stroke s/p TPA   Post 24 hour window of tpa , stable and no neuro deficit   Neurology wants to continue permissive HTN   ON Beta blocker , asa and plavix for now  CTA reviwed , no intervention needed     MRI There are large areas of acute ischemic change in the right cerebellar  hemisphere. 2. Small foci of acute ischemic change noted in the cortex and white matter of  the right parietal lobe. Stroke looks embolic   No history of afib   I discussed with Dr Kennedy Giles need SOLITARIO as he will get anticoagulated despite negative results     CAD s/p MI and stenting in 2012,  S/p cath   LAD widely patent stent, no significant disease. LCX co-dominant, first marginal has multiple high grade lesions including at ostium. Ramus small with tight lesion, 2.0 vessel. RCA small with multiple lesions proximal and mid. LV dilated with apical akinesis and severe global hypokinesis, EF 20%, LVEDP 40-45. Recommend medical therapy for now, given IV lasix after procedure. On asa and plavix ,. Statin beta blocker     Since EF is low will need aldactone and ACE but held due to hypotension and would need same in a week   Hoping to start in a week by cards outpatient       HTN   Stable , on beta blocker , on permissive side per neurology     HLD  Statin     Morbid obesity   counselling started by me   He is very motivated   Wife is a big support     Can be discharged once life vest is arranged and start eliquis in a week     Code status: full   DVT prophylaxis: [place] 6 discussed with: Patient/Family  Disposition: TBD     Hospital Problems  Date Reviewed: 1/23/2019          Codes Class Noted POA    * (Principal) CVA (cerebral vascular accident) [facility]) ICD-10-CM: I63.9  ICD-9-CM: 434.91  1/23/2019 Yes                Review of Systems:   A comprehensive review of systems was negative except for that written in the HPI. Vital Signs:    Last 24hrs VS reviewed since prior progress note. Most recent are:  Visit Vitals  /73 (BP 1 Location: Right arm, BP Patient Position: At rest;Supine; Head of bed elevated (Comment degrees))   Pulse 64   Temp 97.4 °F (36.3 °C)   Resp 17 Ht 6' 1.5\" (1.867 m)   Wt 141.1 kg (311 lb 1.1 oz)   SpO2 97%   BMI 40.48 kg/m²       No intake or output data in the 24 hours ending 01/26/19 0734     Physical Examination:             Constitutional:  No acute distress, cooperative, pleasant    ENT:  Oral mucous moist, oropharynx benign. Neck supple,    Resp:  CTA bilaterally. No wheezing/rhonchi/rales. No accessory muscle use   CV:  Regular rhythm, normal rate, no murmurs, gallops, rubs    GI:  Soft, non distended, non tender. normoactive bowel sounds, no hepatosplenomegaly     Musculoskeletal:  No edema, warm, 2+ pulses throughout    Neurologic:  Moves all extremities. AAOx3, CN II-XII reviewed  Right upper ext 5/5 , left upper 5/5 , left lowwer and right lower 5/5             Data Review:    Review and/or order of clinical lab test      Labs:     Recent Labs     01/24/19  1230 01/24/19  0201   WBC 7.5 7.1   HGB 15.0 14.8   HCT 46.8 45.7    180     Recent Labs     01/25/19  0356 01/24/19  0201    141   K 4.2 4.0    106   CO2 28 27   BUN 19 17   CREA 0.99 0.95   * 151*   CA 8.7 8.6     No results for input(s): SGOT, GPT, ALT, AP, TBIL, TBILI, TP, ALB, GLOB, GGT, AML, LPSE in the last 72 hours. No lab exists for component: AMYP, HLPSE  Recent Labs     01/24/19  1230   INR 1.1   PTP 10.9   APTT 26.7      No results for input(s): FE, TIBC, PSAT, FERR in the last 72 hours. No results found for: FOL, RBCF   No results for input(s): PH, PCO2, PO2 in the last 72 hours.   Recent Labs     01/24/19  0201 01/23/19  1907 01/23/19  1353 01/23/19  0830   CPK  --   --  554* 543*   CKNDX  --   --  4.7* 5.6*   TROIQ 4.17* 8.10* 12.30* 16.30*     Lab Results   Component Value Date/Time    Cholesterol, total 191 01/23/2019 08:30 AM    HDL Cholesterol 52 01/23/2019 08:30 AM    LDL, calculated 125 (H) 01/23/2019 08:30 AM    Triglyceride 70 01/23/2019 08:30 AM    CHOL/HDL Ratio 3.7 01/23/2019 08:30 AM     Lab Results   Component Value Date/Time Glucose (POC) 149 (H) 01/23/2019 01:24 AM    Glucose (POC) 164 (H) 04/10/2012 03:15 AM     No results found for: COLOR, APPRN, SPGRU, REFSG, JET, PROTU, GLUCU, KETU, BILU, UROU, NIMCO, LEUKU, GLUKE, EPSU, BACTU, WBCU, RBCU, CASTS, UCRY      Medications Reviewed:     Current Facility-Administered Medications   Medication Dose Route Frequency    aspirin chewable tablet 81 mg  81 mg Oral DAILY    clopidogrel (PLAVIX) tablet 75 mg  75 mg Oral DAILY    hydrALAZINE (APRESOLINE) 20 mg/mL injection 10 mg  10 mg IntraVENous Q6H PRN    sodium chloride (NS) flush 5-40 mL  5-40 mL IntraVENous Q8H    sodium chloride (NS) flush 5-40 mL  5-40 mL IntraVENous PRN    sodium chloride (NS) flush 5-40 mL  5-40 mL IntraVENous Q8H    sodium chloride (NS) flush 5-40 mL  5-40 mL IntraVENous PRN    acetaminophen (TYLENOL) tablet 650 mg  650 mg Oral Q6H PRN    bisacodyl (DULCOLAX) tablet 5 mg  5 mg Oral DAILY PRN    atorvastatin (LIPITOR) tablet 40 mg  40 mg Oral QHS    carvedilol (COREG) tablet 6.25 mg  6.25 mg Oral BID WITH MEALS    ondansetron (ZOFRAN) injection 4 mg  4 mg IntraVENous Q6H PRN     ______________________________________________________________________  EXPECTED LENGTH OF STAY: 3d 2h  ACTUAL LENGTH OF STAY:          3                 Mone Lorenzo MD

## 2019-01-27 LAB
ANION GAP SERPL CALC-SCNC: 7 MMOL/L (ref 5–15)
BUN SERPL-MCNC: 18 MG/DL (ref 6–20)
BUN/CREAT SERPL: 19 (ref 12–20)
CALCIUM SERPL-MCNC: 8.4 MG/DL (ref 8.5–10.1)
CHLORIDE SERPL-SCNC: 107 MMOL/L (ref 97–108)
CO2 SERPL-SCNC: 27 MMOL/L (ref 21–32)
CREAT SERPL-MCNC: 0.95 MG/DL (ref 0.7–1.3)
GLUCOSE BLD STRIP.AUTO-MCNC: 120 MG/DL (ref 65–100)
GLUCOSE SERPL-MCNC: 109 MG/DL (ref 65–100)
MAGNESIUM SERPL-MCNC: 2.1 MG/DL (ref 1.6–2.4)
POTASSIUM SERPL-SCNC: 4.1 MMOL/L (ref 3.5–5.1)
SERVICE CMNT-IMP: ABNORMAL
SODIUM SERPL-SCNC: 141 MMOL/L (ref 136–145)

## 2019-01-27 PROCEDURE — 83735 ASSAY OF MAGNESIUM: CPT

## 2019-01-27 PROCEDURE — 74011250637 HC RX REV CODE- 250/637: Performed by: INTERNAL MEDICINE

## 2019-01-27 PROCEDURE — 65660000000 HC RM CCU STEPDOWN

## 2019-01-27 PROCEDURE — 74011250637 HC RX REV CODE- 250/637: Performed by: HOSPITALIST

## 2019-01-27 PROCEDURE — 74011250637 HC RX REV CODE- 250/637: Performed by: SPECIALIST

## 2019-01-27 PROCEDURE — 80048 BASIC METABOLIC PNL TOTAL CA: CPT

## 2019-01-27 PROCEDURE — 82962 GLUCOSE BLOOD TEST: CPT

## 2019-01-27 PROCEDURE — 36415 COLL VENOUS BLD VENIPUNCTURE: CPT

## 2019-01-27 RX ADMIN — Medication 10 ML: at 12:13

## 2019-01-27 RX ADMIN — ASPIRIN 81 MG CHEWABLE TABLET 81 MG: 81 TABLET CHEWABLE at 08:32

## 2019-01-27 RX ADMIN — CARVEDILOL 6.25 MG: 6.25 TABLET, FILM COATED ORAL at 08:32

## 2019-01-27 RX ADMIN — ATORVASTATIN CALCIUM 40 MG: 40 TABLET, FILM COATED ORAL at 21:48

## 2019-01-27 RX ADMIN — CARVEDILOL 6.25 MG: 6.25 TABLET, FILM COATED ORAL at 17:38

## 2019-01-27 RX ADMIN — CLOPIDOGREL BISULFATE 75 MG: 75 TABLET ORAL at 08:32

## 2019-01-27 RX ADMIN — Medication 10 ML: at 21:48

## 2019-01-27 RX ADMIN — Medication 10 ML: at 06:20

## 2019-01-27 NOTE — PROGRESS NOTES
[facility] Progress Note         NAME:  Wes Lora   :   1955   MRN:   [id number]     Assessment/Plan:1. Acute CVA              - s/p tPA              - Neurology following              - Plavix and ASA started. 2. CAD              - s/p PCI with JARED to LAD with residual dz - disease in OM1 and OM@ at 60% and RCA at 80% - 2012              - Echo EF 25-30%, severe diffuse hypokinesis with distinct regional wall motion abnormalities                            LA moderate to severe dilation                          - s/p Cardiac cath                                                  - Re: LAD widely patent stent, no significant disease.                                                LCX co-dominant, first marginal has multiple high grade lesions including at ostium.                                                 Ramus small with tight lesion, 2.0 vessel.                                                 RCA small with multiple lesions proximal and mid.                                                 LV dilated with apical akinesis and severe global hypokinesis, EF 20%, LVEDP 40-45.                            - Coreg 6.25 mg BID                          - ACE/ARB and Aldactone held for permissive HTN following CVA, x 1 week.  May begin after 1 week                          - ASA, Plavix and Statin started. nsvt (13 beats)noted overnight potassium and magnesium ok, continue coreg , he will need life vest prior to dc                           3. HTN              - Coreg 6.25 mg BID                          - Permissive HTN not on ace yet  4.  HLD   Cholesterol, total 191 2019 08:30 AM   HDL Cholesterol 52 2019 08:30 AM   LDL, calculated 125 (H) 2019 08:30 AM   VLDL, calculated 14 2019 08:30 AM   Triglyceride 70 2019 08:30 AM   CHOL/HDL Ratio 3.7 2019 08:30 AM                           - Lipitor 40 mg daily  4. Body mass index is 41.55 kg/m².              - Morbid obesity - discussed weight loss and dietary changes        OAC to be started possibly as outpatient as per neuro note ( no oac at this time to avoid hemorrhagic conversion of cva  Continue asa and plavix for now            Subjective:   Cardiac ROS: Patient denies any exertional chest pain, dyspnea, palpitations, syncope, orthopnea, edema or paroxysmal nocturnal dyspnea. Review of Systems:   Pertinent items are noted in the History of Present Illness. Objective:       No intake/output data recorded. No intake/output data recorded. Telemetry: normal sinus rhythm    Physical Exam:  Visit Vitals  /76   Pulse 63   Temp 97.8 °F (36.6 °C)   Resp 14   Ht 6' 1.5\" (1.867 m)   Wt 139.4 kg (307 lb 5.1 oz)   SpO2 97%   BMI 40.00 kg/m²       Neck: no JVD  Heart: regular rate and rhythm  Lungs: clear to auscultation bilaterally  Abdomen: soft, non-tender. Bowel sounds normal. No masses,  no organomegaly  Extremities: no edema    Additional comments: None    Care Plan discussed with:    Comments   Patient     Family      RN     Care Manager                    Consultant:        Data Review:     No results for input(s): TNIPOC in the last 72 hours. No lab exists for component: ITNL   No results for input(s): CPK, CKMB, TROIQ in the last 72 hours.   Recent Labs     01/27/19  0619 01/25/19  0356 01/24/19  1230    142  --    K 4.1 4.2  --     107  --    CO2 27 28  --    BUN 18 19  --    CREA 0.95 0.99  --    * 112*  --    MG 2.1  --   --    WBC  --   --  7.5   HGB  --   --  15.0   HCT  --   --  46.8   PLT  --   --  192     Recent Labs     01/24/19  1230   INR 1.1   PTP 10.9   APTT 26.7       Medications reviewed  Current Facility-Administered Medications   Medication Dose Route Frequency    aspirin chewable tablet 81 mg  81 mg Oral DAILY    clopidogrel (PLAVIX) tablet 75 mg  75 mg Oral DAILY    hydrALAZINE (APRESOLINE) 20 mg/mL injection 10 mg  10 mg IntraVENous Q6H PRN    sodium chloride (NS) flush 5-40 mL  5-40 mL IntraVENous Q8H    sodium chloride (NS) flush 5-40 mL  5-40 mL IntraVENous PRN    sodium chloride (NS) flush 5-40 mL  5-40 mL IntraVENous PRN    acetaminophen (TYLENOL) tablet 650 mg  650 mg Oral Q6H PRN    bisacodyl (DULCOLAX) tablet 5 mg  5 mg Oral DAILY PRN    atorvastatin (LIPITOR) tablet 40 mg  40 mg Oral QHS    carvedilol (COREG) tablet 6.25 mg  6.25 mg Oral BID WITH MEALS    ondansetron (ZOFRAN) injection 4 mg  4 mg IntraVENous Q6H PRN       Data Reviewed: current meds, labs,recent radiology, intake/output/weight and problem list reviewed    Pérez Sykes MD

## 2019-01-27 NOTE — PROGRESS NOTES
Hospitalist Progress Note  Ritika Lamb MD  Answering service: [phone] OR [phone] from in house phone        Date of Service:  2019  NAME:  Jaimie Gonsales  :  1955  MRN:  [id number]      Admission Summary:   50-year-old man with a past medical history significant for coronary artery disease, status post Drug eluting stent placement, hypertension, dyslipidemia, morbid obesity  was in his usual state of health until the day of presentation at the emergency room when he developed sudden onset of left-sided facial droop. It was reported that the patient was last known well at about 9:30 yesterday evening. The patient went to bed around that time. Forty minutes later, the patient woke up to use the bathroom and realized that the left side of his body was weak. He called his wife on the phone who was in another room sleeping, and the wife came to the room and found that the patient had left facial droop in addition to the left-sided weakness. Patient was brought to the emergency room for further evaluation. Interval history / Subjective:     Patient sitting up in the chair and in no distress. Denied any headaches, dizziness, visual deficits, chest pains or SOB. \"I am just waiting to get this life vest on so I can get out of here. \"     Assessment & Plan:       Acute stroke s/p TPA   Continue permissive hypertension  Continue Beta blocker, Aspirin and Plavix. CTA reviwed , no intervention needed     MRI There are large areas of acute ischemic change in the right cerebellar  hemisphere. 2. Small foci of acute ischemic change noted in the cortex and white matter of  the right parietal lobe. Stroke looks embolic   No history of afib   Case was discussed with Dr Cody Jones. Does not need SOLITARIO as he will get anticoagulated even if negative results. Neurology F/U noted and appreciated.     CAD s/p MI and stenting in ,  S/p cath   LAD widely patent stent, no significant disease. LCX co-dominant, first marginal has multiple high grade lesions including at ostium. Ramus small with tight lesion, 2.0 vessel. RCA small with multiple lesions proximal and mid. LV dilated with apical akinesis and severe global hypokinesis, EF 20%, LVEDP 40-45. Recommend medical therapy for now, given IV lasix after procedure. On asa, plavix, Statin and  beta blocker   Since EF is low will need aldactone and ACE but held due to hypotension and would need same in a week   S/P 13 beats of non-sustained V-Tach. Potassium and magnesium levels normal. For fitting of life vest prior to D/C. Cardiology F/U noted and appreciated. HTN   Stable , on beta blocker , on permissive hypertension per neurology     HLD  Statin     Morbid obesity   Therapeutic Lifestyle changes counselling done. Code status: Full code with an extremely guarded prognosis. D/W patient. DVT prophylaxis: SCDs    Care Plan discussed with: Patient. Disposition: Anticipate D/C to home after fitting of [address] Problems  Date Reviewed: 1/23/2019          Codes Class Noted POA    * (Principal) CVA (cerebral vascular accident) [facility]) ICD-10-CM: I63.9  ICD-9-CM: 434.91  1/23/2019 Yes                Review of Systems:   A comprehensive review of systems was negative except for that written in the HPI. Vital Signs:    Last 24hrs VS reviewed since prior progress note. Most recent are:  Visit Vitals  /66 (BP 1 Location: Left arm, BP Patient Position: At rest)   Pulse 62   Temp 97.7 °F (36.5 °C)   Resp 13   Ht 6' 1.5\" (1.867 m)   Wt 139.4 kg (307 lb 5.1 oz)   SpO2 96%   BMI 40.00 kg/m²       No intake or output data in the 24 hours ending 01/27/19 1001     Physical Examination:             Constitutional:  No acute distress, cooperative, pleasant    ENT:  Oral mucous moist, oropharynx benign. Neck supple,    Resp:  CTA bilaterally. No wheezing/rhonchi/rales.  No accessory muscle use   CV:  Regular rhythm, normal rate, no murmurs, gallops, rubs    GI:  Soft, non distended, non tender. normoactive bowel sounds, no hepatosplenomegaly     Musculoskeletal:  No edema, warm, 2+ pulses throughout    Neurologic:  Moves all extremities. AAOx3, CN II-XII reviewed  Right upper ext 5/5 , left upper 5/5 , left lower and right lower 5/5             Data Review:    Review and/or order of clinical lab test      Labs:     Recent Labs     01/24/19  1230   WBC 7.5   HGB 15.0   HCT 46.8        Recent Labs     01/27/19  0619 01/25/19  0356    142   K 4.1 4.2    107   CO2 27 28   BUN 18 19   CREA 0.95 0.99   * 112*   CA 8.4* 8.7   MG 2.1  --      No results for input(s): SGOT, GPT, ALT, AP, TBIL, TBILI, TP, ALB, GLOB, GGT, AML, LPSE in the last 72 hours. No lab exists for component: AMYP, HLPSE  Recent Labs     01/24/19  1230   INR 1.1   PTP 10.9   APTT 26.7      No results for input(s): FE, TIBC, PSAT, FERR in the last 72 hours. No results found for: FOL, RBCF   No results for input(s): PH, PCO2, PO2 in the last 72 hours. No results for input(s): CPK, CKNDX, TROIQ in the last 72 hours.     No lab exists for component: CPKMB  Lab Results   Component Value Date/Time    Cholesterol, total 191 01/23/2019 08:30 AM    HDL Cholesterol 52 01/23/2019 08:30 AM    LDL, calculated 125 (H) 01/23/2019 08:30 AM    Triglyceride 70 01/23/2019 08:30 AM    CHOL/HDL Ratio 3.7 01/23/2019 08:30 AM     Lab Results   Component Value Date/Time    Glucose (POC) 149 (H) 01/23/2019 01:24 AM    Glucose (POC) 164 (H) 04/10/2012 03:15 AM     No results found for: COLOR, APPRN, SPGRU, REFSG, JET, PROTU, GLUCU, KETU, BILU, UROU, NIMCO, LEUKU, GLUKE, EPSU, BACTU, WBCU, RBCU, CASTS, UCRY      Medications Reviewed:     Current Facility-Administered Medications   Medication Dose Route Frequency    aspirin chewable tablet 81 mg  81 mg Oral DAILY    clopidogrel (PLAVIX) tablet 75 mg  75 mg Oral DAILY    hydrALAZINE (APRESOLINE) 20 mg/mL injection 10 mg  10 mg IntraVENous Q6H PRN    sodium chloride (NS) flush 5-40 mL  5-40 mL IntraVENous Q8H    sodium chloride (NS) flush 5-40 mL  5-40 mL IntraVENous PRN    sodium chloride (NS) flush 5-40 mL  5-40 mL IntraVENous PRN    acetaminophen (TYLENOL) tablet 650 mg  650 mg Oral Q6H PRN    bisacodyl (DULCOLAX) tablet 5 mg  5 mg Oral DAILY PRN    atorvastatin (LIPITOR) tablet 40 mg  40 mg Oral QHS    carvedilol (COREG) tablet 6.25 mg  6.25 mg Oral BID WITH MEALS    ondansetron (ZOFRAN) injection 4 mg  4 mg IntraVENous Q6H PRN     ______________________________________________________________________  EXPECTED LENGTH OF STAY: 3d 2h  ACTUAL LENGTH OF STAY:          4                 Daksha More MD

## 2019-01-27 NOTE — PROGRESS NOTES
Spiritual Care Partner Volunteer visited patient in [place] [address] on 1/27/19. Documented by:   Chaplain Romano MDiv, MACE  [phone] [phone])

## 2019-01-28 VITALS
OXYGEN SATURATION: 96 % | HEIGHT: 74 IN | BODY MASS INDEX: 39.24 KG/M2 | HEART RATE: 64 BPM | TEMPERATURE: 98.3 F | SYSTOLIC BLOOD PRESSURE: 123 MMHG | WEIGHT: 305.78 LBS | RESPIRATION RATE: 12 BRPM | DIASTOLIC BLOOD PRESSURE: 72 MMHG

## 2019-01-28 LAB
GLUCOSE BLD STRIP.AUTO-MCNC: 117 MG/DL (ref 65–100)
GLUCOSE BLD STRIP.AUTO-MCNC: 97 MG/DL (ref 65–100)
MAGNESIUM SERPL-MCNC: 2.2 MG/DL (ref 1.6–2.4)
POTASSIUM SERPL-SCNC: 4 MMOL/L (ref 3.5–5.1)
SERVICE CMNT-IMP: ABNORMAL
SERVICE CMNT-IMP: NORMAL

## 2019-01-28 PROCEDURE — 83735 ASSAY OF MAGNESIUM: CPT

## 2019-01-28 PROCEDURE — 82962 GLUCOSE BLOOD TEST: CPT

## 2019-01-28 PROCEDURE — 74011250637 HC RX REV CODE- 250/637: Performed by: HOSPITALIST

## 2019-01-28 PROCEDURE — 36415 COLL VENOUS BLD VENIPUNCTURE: CPT

## 2019-01-28 PROCEDURE — 74011250637 HC RX REV CODE- 250/637: Performed by: SPECIALIST

## 2019-01-28 PROCEDURE — 84132 ASSAY OF SERUM POTASSIUM: CPT

## 2019-01-28 RX ADMIN — CLOPIDOGREL BISULFATE 75 MG: 75 TABLET ORAL at 08:30

## 2019-01-28 RX ADMIN — ASPIRIN 81 MG CHEWABLE TABLET 81 MG: 81 TABLET CHEWABLE at 08:30

## 2019-01-28 RX ADMIN — Medication 10 ML: at 06:37

## 2019-01-28 RX ADMIN — CARVEDILOL 6.25 MG: 6.25 TABLET, FILM COATED ORAL at 17:39

## 2019-01-28 RX ADMIN — CARVEDILOL 6.25 MG: 6.25 TABLET, FILM COATED ORAL at 08:30

## 2019-01-28 NOTE — DISCHARGE SUMMARY
Discharge Summary       PATIENT ID: Manuel Duffy  MRN: [id number]   YOB: 1955    DATE OF ADMISSION: 1/23/2019  1:26 AM    DATE OF DISCHARGE: 1/28/2019   PRIMARY CARE PROVIDER: None       DISCHARGING PHYSICIAN: Nick Mir MD    To contact this individual call [phone] and ask the  to page. If unavailable ask to be transferred the Adult Hospitalist Department. CONSULTATIONS: IP CONSULT TO NEUROLOGY  IP CONSULT TO CARDIOLOGY    PROCEDURES/SURGERIES: * No surgery found *    ADMITTING DIAGNOSES & HOSPITAL COURSE:     59-year-old man with a past medical history significant for coronary artery disease, status post Drug eluting stent placement, hypertension, dyslipidemia, morbid obesity  was in his usual state of health until the day of presentation at the emergency room when he developed sudden onset of left-sided facial droop. It was reported that the patient was last known well at about 9:30 yesterday evening. The patient went to bed around that time. Forty minutes later, the patient woke up to use the bathroom and realized that the left side of his body was weak.  He called his wife on the phone who was in another room sleeping, and the wife came to the room and found that the patient had left facial droop in addition to the left-sided weakness. Patient was brought to the emergency room for further evaluation.     DISCHARGE DIAGNOSES / PLAN:      Left sided weakness secondary to Acute CVA s/p TPA on admission. Likely cardio embolic stroke. CTA of the head: No large vessel occlusion. no intervention needed     MRI of brain: There are large areas of acute ischemic changes in the right cerebellar  hemisphere. 2. Small foci of acute ischemic change noted in the cortex and white matter of  the right parietal lobe. Echo: EF 25-30%, severe diffuse hypokinesis with distinct regional wall motion abnormalities. Left atrium mod to severely dilated.  No left to right shunt.  - Started on dual anti-platelet therapy, continue  - Permissive hypertension for one week. No therapy needs indicated as per PT, cleared for discharge home. Follow up with neurology out-patient.       CAD s/p MI and stenting in 2012,  S/p cath   LAD widely patent stent, no significant disease.  LCX co-dominant, first marginal has multiple high grade lesions including at ostium.  Ramus small with tight lesion, 2.0 vessel.  RCA small with multiple lesions proximal and mid.  LV dilated with apical akinesis and severe global hypokinesis, EF 20%, LVEDP 40-45.  Recommend medical therapy for now, given IV lasix after procedure. On asa, plavix, Statin and  beta blocker   Since EF is low will need aldactone and ACE but held due to hypotension and would need same in a week   S/P 13 beats of non-sustained V-Tach. Potassium and magnesium levels normal. For fitting of life vest prior to D/C. Cardiology F/U noted and appreciated. Ischemic cardiomyopathy: EF 25-30%. NYHA class 1  Fitted on life vest today  No heart failure medication as per cardiology for one week. No ACEI in other to prevent hypotension in the setting of stroke  Out-patient follow up with cardiology     HTN   Stable , on beta blocker. Permissive hypertension for one week as per neurology.     HLD  Statin      Morbid obesity   Therapeutic Lifestyle changes counselling done. Elevated troponin: poa, likely due to NSTEMI vs due to ischemic cardiomyopathy. Patient is s/p cardiac cath in 2012 and not complaint with medical therapy. Continue aspirin and plavix. F/u with cardiology OP      Code status: Full code with an extremely guarded prognosis.  D/W patient.     DVT prophylaxis: SCDs     Care Plan discussed with: Patient.     Disposition prior to discharge: hemodynamically stable and has improved        PENDING TEST RESULTS:   At the time of discharge the following test results are still pending:     FOLLOW UP APPOINTMENTS:    Follow-up Information     Follow up With Specialties Details Why Jennifer Mcgregor MD Cardiology In 1 week  [id number]  [address] [facility] [address] [facility]      Noam Celestin MD Neurology In 1 week  [address] [facility]  [phone]      None    None (649) Patient stated that they have no PCP             ADDITIONAL CARE RECOMMENDATIONS:     DIET: Cardiac Diet    ACTIVITY: Activity as tolerated    WOUND CARE:     EQUIPMENT needed:       DISCHARGE MEDICATIONS:  Current Discharge Medication List      START taking these medications    Details   aspirin 81 mg chewable tablet Take 1 Tab by mouth daily. Qty: 90 Tab, Refills: 0      atorvastatin (LIPITOR) 40 mg tablet Take 1 Tab by mouth nightly. Qty: 90 Tab, Refills: 0      carvedilol (COREG) 6.25 mg tablet Take 1 Tab by mouth two (2) times daily (with meals). Qty: 90 Tab, Refills: 0      clopidogrel (PLAVIX) 75 mg tab Take 1 Tab by mouth daily. Qty: 90 Tab, Refills: 0               NOTIFY YOUR PHYSICIAN FOR ANY OF THE FOLLOWING:   Fever over 101 degrees for 24 hours. Chest pain, shortness of breath, fever, chills, nausea, vomiting, diarrhea, change in mentation, falling, weakness, bleeding. Severe pain or pain not relieved by medications. Or, any other signs or symptoms that you may have questions about. DISPOSITION:  x  Home With:   OT  PT  HH  RN       Long term SNF/Inpatient Rehab    Independent/assisted living    Hospice    Other:       PATIENT CONDITION AT DISCHARGE:     Functional status    Poor     Deconditioned    x Independent      Cognition   x  Lucid     Forgetful     Dementia      Catheters/lines (plus indication)    Martinez     PICC     PEG    x None      Code status    x Full code     DNR      PHYSICAL EXAMINATION AT DISCHARGE:   Refer to Progress Note    Constitutional:  No acute distress, cooperative, pleasant    ENT:  Oral mucous moist, oropharynx benign. Neck supple,    Resp:  CTA bilaterally. No wheezing/rhonchi/rales.  No accessory muscle use   CV:  Regular rhythm, normal rate, no murmurs, gallops, rubs    GI:  Soft, non distended, non tender. normoactive bowel sounds, no hepatosplenomegaly     Musculoskeletal:  No edema, warm, 2+ pulses throughout    Neurologic:  Moves all extremities. AAOx3, CN II-XII reviewed  Right upper ext 5/5 , left upper 5/5 , left lower and right lower 5/5                              CHRONIC MEDICAL DIAGNOSES:  Problem List as of 1/28/2019 Date Reviewed: 1/23/2019          Codes Class Noted - Resolved    * (Principal) CVA (cerebral vascular accident) ([facility] ICD-10-CM: I63.9  ICD-9-CM: 434.91  1/23/2019 - Present        Hyperlipidemia ICD-10-CM: E78.5  ICD-9-CM: 272.4  4/24/2012 - Present        Coronary atherosclerosis of native coronary artery ICD-10-CM: I25.10  ICD-9-CM: 414.01  4/24/2012 - Present        Acute anterior wall MI ([facility] ICD-10-CM: I21.09  ICD-9-CM: 410.10  4/10/2012 - Present    Overview Addendum 4/24/2012  3:40 PM by Kourtney Grant MD     4/12 100% mid lad treated with single suly. 60% om1, 80% prox rca.                      Greater than 30 minutes were spent with the patient on counseling and coordination of care    Signed:   Fernando Spence MD  1/28/2019  3:36 PM

## 2019-01-28 NOTE — DISCHARGE INSTRUCTIONS
Discharge Instructions       PATIENT ID: Tracy Tillman  MRN: [id number]   YOB: 1955    DATE OF ADMISSION: 1/23/2019  1:26 AM    DATE OF DISCHARGE: 1/28/2019    PRIMARY CARE PROVIDER: None     ATTENDING PHYSICIAN: Selina Amin MD  DISCHARGING PROVIDER: Sarah Contreras MD    To contact this individual call [phone] and ask the  to page. If unavailable ask to be transferred the Adult Hospitalist Department. DISCHARGE DIAGNOSES   Stroke     CONSULTATIONS: IP CONSULT TO NEUROLOGY  IP CONSULT TO CARDIOLOGY    PROCEDURES/SURGERIES: * No surgery found *    PENDING TEST RESULTS:   At the time of discharge the following test results are still pending:     FOLLOW UP APPOINTMENTS:   Follow-up Information     Follow up With Specialties Details Why Contact Info    Jason Andres MD Cardiology In 1 week  [place] [address]  [address] [address]      Jessica Sosa MD Neurology In 1 week  [address] [facility]  [phone]             ADDITIONAL CARE RECOMMENDATIONS:   Please follow up your cardiologist and neurologist  Tale medications as prescribed for now and have to start a stronger blood thinner in one week   Dr Jodi Dsouza or Dr Eduardo Zamora would prescribe that to start in a week   You need to be on a life vest before you leave the hospital         DIET: Cardiac Diet  Oral Nutritional Supplements:     ACTIVITY: Activity as tolerated    WOUND CARE:     EQUIPMENT needed:       DISCHARGE MEDICATIONS:   See Medication Reconciliation Form    · It is important that you take the medication exactly as they are prescribed. · Keep your medication in the bottles provided by the pharmacist and keep a list of the medication names, dosages, and times to be taken in your wallet. · Do not take other medications without consulting your doctor. NOTIFY YOUR PHYSICIAN FOR ANY OF THE FOLLOWING:   Fever over 101 degrees for 24 hours.    Chest pain, shortness of breath, fever, chills, nausea, vomiting, diarrhea, change in mentation, falling, weakness, bleeding. Severe pain or pain not relieved by medications. Or, any other signs or symptoms that you may have questions about.       DISPOSITION:   xx Home With:   OT  PT  HH  RN       SNF/Inpatient Rehab/LTAC    Independent/assisted living    Hospice    Other:     CDMP Checked:   Yes x     PROBLEM LIST Updated:  Yes x       Signed:   Octavia Opitz, MD  1/25/2019  2:39 PM

## 2019-01-28 NOTE — PROGRESS NOTES
1/28/2019     Admit Date: 1/23/2019    Admit Diagnosis: CVA (cerebral vascular accident) [facility])    Principal Problem:    CVA (cerebral vascular accident) ([address] (1/23/2019)        Subjective:  Feels great. No complaints. Orlando Lau denies chest pain, dyspnea, palpitations, syncope, orthopnea, paroxysmal nocturnal dyspnea, exertional chest pressure/discomfort, claudication, lower extremity edema. Assessment/Plan:  Ischemic cardiomyopathy, stable symptoms. Will hold oacs and further heart failure meds for about a week after discussion with neurology and risks of bleeding and hypotension. Still trying to get life vest.   Mimi Alas MD    Objective:     Visit Vitals  /65   Pulse 97   Temp 98.3 °F (36.8 °C)   Resp 13   Ht 6' 1.5\" (1.867 m)   Wt 305 lb 12.5 oz (138.7 kg)   SpO2 96%   BMI 39.80 kg/m²        Physical Exam:  Neck: supple, symmetrical, trachea midline, no adenopathy, no carotid bruit and no JVD  Heart: regular rate and rhythm, S1, S2 normal, no murmur, click, rub or gallop  Lungs: clear to auscultation bilaterally  Abdomen: soft, non-tender. Bowel sounds normal. No masses,  no organomegaly  Extremities: extremities normal, atraumatic, no cyanosis or edema      Labs:    @LABRCNT[CPK:1,CKQMB:1,CPKMB:1,CKMB:!,TROIQ:1@  Recent Labs     01/28/19  0250 01/27/19  0619   NA  --  141   K 4.0 4.1   CL  --  107   BUN  --  18   CREA  --  0.95   GLU  --  109*   CA  --  8.4*     No results for input(s): WBC, HGB, HCT, PLT, HGBEXT, HCTEXT, PLTEXT in the last 72 hours. No results for input(s): CHOL, LDLC in the last 72 hours.     No lab exists for component: TGL, HDLC,  HBA1C         Current Facility-Administered Medications   Medication Dose Route Frequency    aspirin chewable tablet 81 mg  81 mg Oral DAILY    clopidogrel (PLAVIX) tablet 75 mg  75 mg Oral DAILY    hydrALAZINE (APRESOLINE) 20 mg/mL injection 10 mg  10 mg IntraVENous Q6H PRN    sodium chloride (NS) flush 5-40 mL  5-40 mL IntraVENous Q8H    sodium chloride (NS) flush 5-40 mL  5-40 mL IntraVENous PRN    sodium chloride (NS) flush 5-40 mL  5-40 mL IntraVENous PRN    acetaminophen (TYLENOL) tablet 650 mg  650 mg Oral Q6H PRN    bisacodyl (DULCOLAX) tablet 5 mg  5 mg Oral DAILY PRN    atorvastatin (LIPITOR) tablet 40 mg  40 mg Oral QHS    carvedilol (COREG) tablet 6.25 mg  6.25 mg Oral BID WITH MEALS    ondansetron (ZOFRAN) injection 4 mg  4 mg IntraVENous Q6H PRN       Data Review: current meds, labs,recent radiology, intake/output/weight and problem list reviewed

## 2019-01-28 NOTE — PROGRESS NOTES
I have reviewed discharge instructions with the patient and spouse. The patient and spouse verbalized understanding. Bedside RN performed patient education and medication education. Discharge concerns initiated and discussed with patient, including clarification on \"who\" assists the patient at their home and instructions for when the home going patient should call their provider after discharge. Opportunity for questions and clarification was provided. Patient receptive to education: YES  Patient stated: I will followup with my neurologist and cardiologist and get my prescriptions on the way home  Barriers to Education: none  Diagnosis Education given:  YES    Length of stay: 5  Expected Day of Discharge: 5    Ask if they have \"Help at Home\" & add to white board?   YES    Education Day #: 5    Medication Education Given:  YES  M in the box Medication name: Asprin, coreg, plavix, lipitor    Pt aware of HCAHPS survey: YES            Stroke Education documented in Patient Education: YES  Core Measures Documented in [facility]:  Risk Factors: YES  Warning signs of stroke: YES  When to Activate 911: YES  Medication Education for Risk Factors: YES  Smoking cessation if applicable: NO  Written Education Given:  YES    Discharge NIH Completed: YES  Score: 0      BRAINS: YES

## 2019-01-28 NOTE — PROGRESS NOTES
Occupational Therapy:   Arrived at patient's room. Patient currently being fitted for Life Vest. Will defer. Noted that Physical Therapy has discharged with no further needs. Anticipate patient will not have further OT needs based on his last visit. Per chart, patient lives in 2 level home with wife and was IND with ADLs, IADLs and functional mobility PTA. Patient was working and driving PTA. Patient has all needed DME 3 80year old mother living with family however does not use DME at baseline. Wife available to assist at baseline.    ZOYA Cain

## 2019-02-04 ENCOUNTER — OFFICE VISIT (OUTPATIENT)
Dept: CARDIOLOGY CLINIC | Age: 64
End: 2019-02-04

## 2019-02-04 VITALS
BODY MASS INDEX: 39.45 KG/M2 | RESPIRATION RATE: 17 BRPM | DIASTOLIC BLOOD PRESSURE: 86 MMHG | WEIGHT: 307.4 LBS | OXYGEN SATURATION: 98 % | SYSTOLIC BLOOD PRESSURE: 124 MMHG | HEART RATE: 68 BPM | HEIGHT: 74 IN

## 2019-02-04 DIAGNOSIS — I25.5 ISCHEMIC CARDIOMYOPATHY: ICD-10-CM

## 2019-02-04 DIAGNOSIS — E78.2 MIXED HYPERLIPIDEMIA: ICD-10-CM

## 2019-02-04 DIAGNOSIS — E66.01 OBESITY, MORBID (HCC): ICD-10-CM

## 2019-02-04 DIAGNOSIS — I25.10 ATHEROSCLEROSIS OF NATIVE CORONARY ARTERY OF NATIVE HEART WITHOUT ANGINA PECTORIS: Primary | ICD-10-CM

## 2019-02-04 RX ORDER — LISINOPRIL 5 MG/1
5 TABLET ORAL DAILY
Qty: 30 TAB | Refills: 5 | Status: SHIPPED | OUTPATIENT
Start: 2019-02-04 | End: 2019-03-01 | Stop reason: SDUPTHER

## 2019-02-04 RX ORDER — SPIRONOLACTONE 25 MG/1
25 TABLET ORAL DAILY
Qty: 30 TAB | Refills: 5 | Status: SHIPPED | OUTPATIENT
Start: 2019-02-04 | End: 2019-03-01 | Stop reason: SDUPTHER

## 2019-02-04 RX ORDER — WARFARIN SODIUM 5 MG/1
5 TABLET ORAL DAILY
Qty: 30 TAB | Refills: 5 | Status: SHIPPED | OUTPATIENT
Start: 2019-02-04 | End: 2019-03-01 | Stop reason: SDUPTHER

## 2019-02-04 NOTE — PROGRESS NOTES
HISTORY OF PRESENT ILLNESS Wellington Rich is a 58 y.o. male SUMMARY:  
Problem List  Date Reviewed: 2/4/2019 Codes Class Noted Obesity, morbid ([address] ICD-10-CM: E66.01 
ICD-9-CM: 278.01  2/4/2019 CVA (cerebral vascular accident) [facility]) ICD-10-CM: I63.9 ICD-9-CM: 434.91  1/23/2019 Hyperlipidemia ICD-10-CM: E78.5 ICD-9-CM: 272.4  4/24/2012 Coronary atherosclerosis of native coronary artery ICD-10-CM: I25.10 ICD-9-CM: 414.01  4/24/2012 Acute anterior wall MI [facility]) ICD-10-CM: I21.09 
ICD-9-CM: 410.10  4/10/2012 Overview Addendum 4/24/2012  3:40 PM by Regi Ly MD  
  4/12 100% mid lad treated with single suly. 60% om1, 80% prox rca. Current Outpatient Medications on File Prior to Visit Medication Sig  
 aspirin 81 mg chewable tablet Take 1 Tab by mouth daily.  atorvastatin (LIPITOR) 40 mg tablet Take 1 Tab by mouth nightly.  carvedilol (COREG) 6.25 mg tablet Take 1 Tab by mouth two (2) times daily (with meals).  clopidogrel (PLAVIX) 75 mg tab Take 1 Tab by mouth daily. No current facility-administered medications on file prior to visit. CARDIOLOGY STUDIES TO DATE: 
1/19 echo dilated lv with lvef 25-30%, lae, mild aortic stenosis 1/19 cardiac cath: LAD widely patent stent, no significant disease. LCX co-dominant, first marginal has multiple high grade lesions including at ostium. Ramus small with tight lesion, 2.0 vessel. RCA small with multiple lesions proximal and mid. LV dilated with apical akinesis and severe global hypokinesis, EF 20%, LVEDP 40-45. Chief Complaint Patient presents with  Coronary Artery Disease HPI : 
Mr. Yari Craig was recently hospitalized with a stroke and his MRI showed multiple foci consistent with an embolic source. He also had a NSTEMI and had a repeat catheterization, which showed disease best medically managed. His ejection fraction was low, so he is now wearing a Life Vest and we held off on fully instituting therapy for his low ejection fraction because neurology wanted his blood pressure to stay up for a few days. CARDIAC ROS:  
negative for chest pain, dyspnea, palpitations, syncope, orthopnea, paroxysmal nocturnal dyspnea, exertional chest pressure/discomfort, claudication, lower extremity edema No family history on file. Past Medical History:  
Diagnosis Date  CAD (coronary artery disease) stent, MI 4-10-12 GENERAL ROS: 
A comprehensive review of systems was negative except for that written in the HPI. Visit Vitals /86 (BP 1 Location: Right arm, BP Patient Position: Sitting) Pulse 68 Resp 17 Ht 6' 1.5\" (1.867 m) Wt 307 lb 6.4 oz (139.4 kg) SpO2 98% BMI 40.01 kg/m² Wt Readings from Last 3 Encounters:  
02/04/19 307 lb 6.4 oz (139.4 kg) 01/28/19 305 lb 12.5 oz (138.7 kg) 01/23/19 323 lb (146.5 kg) BP Readings from Last 3 Encounters:  
02/04/19 124/86  
01/28/19 123/72  
06/29/12 108/70 PHYSICAL EXAM 
General appearance: alert, cooperative, no distress, appears stated age Neurologic: Alert and oriented X 3 Neck: supple, symmetrical, trachea midline, no adenopathy, no carotid bruit and no JVD Lungs: clear to auscultation bilaterally Heart: regular rate and rhythm, S1, S2 normal, no murmur, click, rub or gallop Extremities: extremities normal, atraumatic, no cyanosis or edema Lab Results Component Value Date/Time  Cholesterol, total 191 01/23/2019 08:30 AM  
 Cholesterol, total 180 04/11/2012 04:18 AM  
 HDL Cholesterol 52 01/23/2019 08:30 AM  
 HDL Cholesterol 36 04/11/2012 04:18 AM  
 LDL, calculated 125 (H) 01/23/2019 08:30 AM  
 LDL, calculated 127 (H) 04/11/2012 04:18 AM  
 Triglyceride 70 01/23/2019 08:30 AM  
 Triglyceride 85 04/11/2012 04:18 AM  
 CHOL/HDL Ratio 3.7 01/23/2019 08:30 AM  
 CHOL/HDL Ratio 5.0 04/11/2012 04:18 AM  
 
ASSESSMENT Neurology recommended full oral anticoagulation, so we talked about options there. He cannot afford Eliquis because he has no insurance, so we are going to go with Coumadin. He will stop his Plavix and continue baby aspirin. We will continue his Carvedilol and Atorvastatin and add Lisinopril 5 mg a day and Spironolactone 25 mg a day. He will need follow-up echocardiogram in about two to three months and we will see him back in a month or so and try to up titrate his medications. current treatment plan is effective, no change in therapy 
lab results and schedule of future lab studies reviewed with patient 
reviewed diet, exercise and weight control Encounter Diagnoses Name Primary?  Atherosclerosis of native coronary artery of native heart without angina pectoris Yes  Obesity, morbid ([address]  Mixed hyperlipidemia  Ischemic cardiomyopathy No orders of the defined types were placed in this encounter. Follow-up Disposition: 
Return in about 4 weeks (around 3/4/2019). Rebbeca Lesch, MD2/4/2019

## 2019-02-04 NOTE — PATIENT INSTRUCTIONS
Stop Plavix. Start coumadin. Take it at night. Start lisinopril 5 mg daily and spironolactone 25 mg daily. Have INR checked and see Dr. Smiley Cruz for follow up as scheduled.

## 2019-02-08 ENCOUNTER — ANTI-COAG VISIT (OUTPATIENT)
Dept: CARDIOLOGY CLINIC | Age: 64
End: 2019-02-08

## 2019-02-08 DIAGNOSIS — I63.9 IMPENDING CEREBROVASCULAR ACCIDENT (HCC): ICD-10-CM

## 2019-02-08 DIAGNOSIS — Z79.01 LONG TERM (CURRENT) USE OF ANTICOAGULANTS: ICD-10-CM

## 2019-02-08 LAB
INR BLD: 1.3
PT POC: NORMAL SECONDS
VALID INTERNAL CONTROL?: YES

## 2019-02-08 NOTE — PROGRESS NOTES
A full discussion of the nature of anticoagulants has been carried out. A benefit risk analysis has been presented to the patient, so that they understand the justification for choosing anticoagulation at this time. The need for frequent and regular monitoring, precise dosage adjustment and compliance is stressed. Side effects of potential bleeding are discussed. The patient should avoid any OTC items containing aspirin or ibuprofen, and should avoid great swings in general diet. Avoid alcohol consumption. Call if any signs of abnormal bleeding. Next PT/INR test in 1 week. New coumadin start. Patient instructed to increase dose to 7.5 mg on Tu/Fri and 5 mg all other days. Written and verbal instructions given to patient. Patient verbalized understanding INR goal range 2-3 per Dr. Graciela Gonzalez- abhijit stroke Anticoagulation Summary  As of 2019 INR goal:   2.0-3.0  
TTR:    INR used for dosin.3! (2019) Warfarin maintenance plan:   7.5 mg (5 mg x 1.5) on e, Fri; 5 mg (5 mg x 1) all other days Weekly warfarin total:   40 mg  
Plan last modified:   Licha Guerra RN (2019) Next INR check:     
Target end date:     
 Indications Impending cerebrovascular accident [facility]) [I63.9] Long term (current) use of anticoagulants [Z79.01] Anticoagulation Episode Summary INR check location:     
 Preferred lab:     
 Send INR reminders to:     
 Comments: Anticoagulation Care Providers Provider Role Specialty Phone number Abhi Yousif MD Responsible Cardiology [phone] Future Appointments Date Time Provider Jack Lopez 2/15/2019 10:40 AM FARZANA HODGES SCHED  
3/4/2019 11:40 AM Abhi Yousif  [address]

## 2019-02-15 ENCOUNTER — ANTI-COAG VISIT (OUTPATIENT)
Dept: CARDIOLOGY CLINIC | Age: 64
End: 2019-02-15

## 2019-02-15 DIAGNOSIS — I63.9 IMPENDING CEREBROVASCULAR ACCIDENT (HCC): ICD-10-CM

## 2019-02-15 DIAGNOSIS — Z79.01 LONG TERM (CURRENT) USE OF ANTICOAGULANTS: ICD-10-CM

## 2019-02-15 LAB
INR BLD: 1.7
PT POC: NORMAL SECONDS
VALID INTERNAL CONTROL?: YES

## 2019-02-22 ENCOUNTER — ANTI-COAG VISIT (OUTPATIENT)
Dept: CARDIOLOGY CLINIC | Age: 64
End: 2019-02-22

## 2019-02-22 DIAGNOSIS — Z79.01 LONG TERM (CURRENT) USE OF ANTICOAGULANTS: ICD-10-CM

## 2019-02-22 DIAGNOSIS — I63.9 IMPENDING CEREBROVASCULAR ACCIDENT (HCC): ICD-10-CM

## 2019-02-22 LAB
INR BLD: 1.4
PT POC: NORMAL SECONDS
VALID INTERNAL CONTROL?: YES

## 2019-02-22 NOTE — PROGRESS NOTES
A full discussion of the nature of anticoagulants has been carried out. A benefit risk analysis has been presented to the patient, so that they understand the justification for choosing anticoagulation at this time. The need for frequent and regular monitoring, precise dosage adjustment and compliance is stressed. Side effects of potential bleeding are discussed. The patient should avoid any OTC items containing aspirin or ibuprofen, and should avoid great swings in general diet. Avoid alcohol consumption. Call if any signs of abnormal bleeding. Next PT/INR test in 1 week. Patient reports his diet is basically all vegetables. Instructed to increase dose to 5 mg daily except M/W/F take 10 mg (2 tabs). Written and verbal instructions given to patient. Patient verbalized understanding TWD increased 17.7% Anticoagulation Summary  As of 2019 INR goal:   2.0-3.0  
TTR:   0.0 % (4 d) INR used for dosin.4! (2019) Warfarin maintenance plan:   10 mg (5 mg x 2) on Mon, Wed, Fri; 5 mg (5 mg x 1) all other days Weekly warfarin total:   50 mg  
Plan last modified:   Cedric Winkler RN (2019) Next INR check:     
Target end date:     
 Indications Impending cerebrovascular accident [facility]) [I63.9] Long term (current) use of anticoagulants [Z79.01] Anticoagulation Episode Summary INR check location:     
 Preferred lab:     
 Send INR reminders to:     
 Comments: Anticoagulation Care Providers Provider Role Specialty Phone number Sebastian Davila MD Responsible Cardiology [phone] Future Appointments Date Time Provider Jack Lopez 2019  9:00 AM COUMADINFARZANA BETSY SCHED  
3/1/2019  1:20 PM COUMADIN, [address]  
3/4/2019 11:40 AM Sebastian Davila  [address]

## 2019-03-01 ENCOUNTER — ANTI-COAG VISIT (OUTPATIENT)
Dept: CARDIOLOGY CLINIC | Age: 64
End: 2019-03-01

## 2019-03-01 ENCOUNTER — TELEPHONE (OUTPATIENT)
Dept: CARDIOLOGY CLINIC | Age: 64
End: 2019-03-01

## 2019-03-01 DIAGNOSIS — I25.10 ATHEROSCLEROSIS OF NATIVE CORONARY ARTERY OF NATIVE HEART WITHOUT ANGINA PECTORIS: ICD-10-CM

## 2019-03-01 DIAGNOSIS — I63.9 IMPENDING CEREBROVASCULAR ACCIDENT (HCC): ICD-10-CM

## 2019-03-01 DIAGNOSIS — Z79.01 LONG TERM (CURRENT) USE OF ANTICOAGULANTS: ICD-10-CM

## 2019-03-01 DIAGNOSIS — E78.2 MIXED HYPERLIPIDEMIA: ICD-10-CM

## 2019-03-01 DIAGNOSIS — E66.01 OBESITY, MORBID (HCC): ICD-10-CM

## 2019-03-01 DIAGNOSIS — I25.5 ISCHEMIC CARDIOMYOPATHY: ICD-10-CM

## 2019-03-01 LAB
INR BLD: 2.1
PT POC: NORMAL SECONDS
VALID INTERNAL CONTROL?: YES

## 2019-03-01 RX ORDER — SPIRONOLACTONE 25 MG/1
25 TABLET ORAL DAILY
Qty: 90 TAB | Refills: 1 | Status: SHIPPED | OUTPATIENT
Start: 2019-03-01 | End: 2019-09-23 | Stop reason: SDUPTHER

## 2019-03-01 RX ORDER — WARFARIN SODIUM 5 MG/1
TABLET ORAL
Qty: 130 TAB | Refills: 0 | Status: SHIPPED | OUTPATIENT
Start: 2019-03-01 | End: 2019-06-21 | Stop reason: SDUPTHER

## 2019-03-01 RX ORDER — ROSUVASTATIN CALCIUM 10 MG/1
10 TABLET, COATED ORAL
Qty: 90 TAB | Refills: 1 | Status: SHIPPED | OUTPATIENT
Start: 2019-03-01 | End: 2019-03-01 | Stop reason: CLARIF

## 2019-03-01 RX ORDER — LISINOPRIL 5 MG/1
5 TABLET ORAL DAILY
Qty: 90 TAB | Refills: 1 | Status: SHIPPED | OUTPATIENT
Start: 2019-03-01 | End: 2019-03-04

## 2019-03-01 RX ORDER — CARVEDILOL 6.25 MG/1
6.25 TABLET ORAL 2 TIMES DAILY WITH MEALS
Qty: 180 TAB | Refills: 1 | Status: SHIPPED | OUTPATIENT
Start: 2019-03-01 | End: 2019-09-27 | Stop reason: SDUPTHER

## 2019-03-01 NOTE — TELEPHONE ENCOUNTER
Requested Prescriptions     Signed Prescriptions Disp Refills    carvedilol (COREG) 6.25 mg tablet 180 Tab 1     Sig: Take 1 Tab by mouth two (2) times daily (with meals). Authorizing Provider: Reji Clark     Ordering User: Fermin Russell    lisinopril (PRINIVIL, ZESTRIL) 5 mg tablet 90 Tab 1     Sig: Take 1 Tab by mouth daily. Authorizing Provider: Reji Clark     Ordering User: Chucho Hameed spironolactone (ALDACTONE) 25 mg tablet 90 Tab 1     Sig: Take 1 Tab by mouth daily. Authorizing Provider: Reji [employer]     Ordering User: Veronica Thacker     Verbal order per Dr. Corinna Cartwright. Follow up with Dr. Corinna Cartwright on 3-4-19.

## 2019-03-01 NOTE — TELEPHONE ENCOUNTER
Patient in for coumadin appt. Asking for refills on coumadin, coreg, lisinopril and spironalactone.   Send to [address] [facility],

## 2019-03-01 NOTE — PROGRESS NOTES
A full discussion of the nature of anticoagulants has been carried out. A benefit risk analysis has been presented to the patient, so that they understand the justification for choosing anticoagulation at this time. The need for frequent and regular monitoring, precise dosage adjustment and compliance is stressed. Side effects of potential bleeding are discussed. The patient should avoid any OTC items containing aspirin or ibuprofen, and should avoid great swings in general diet. Avoid alcohol consumption. Call if any signs of abnormal bleeding. Next PT/INR test in 1 week. Patient in [place] next week. Continue current dosing regimen. Patient verbalized understanding Requested Prescriptions Signed Prescriptions Disp Refills  warfarin (COUMADIN) 5 mg tablet 130 Tab 0 Sig: Take 2 tabs on // and 1 tab all other days or as directed by coumadin clinic Per Dr. Hayes Presumlaureen Future Appointments Date Time Provider Jack Lopez 3/4/2019 11:40 AM Tony Marcos  [address]  
3/11/2019  8:40 AM [facility] [address] [facility]  As of 3/1/2019 INR goal:   2.0-3.0  
TTR:   8.9 % (1.6 wk) INR used for dosin.1 (3/1/2019) Warfarin maintenance plan:   10 mg (5 mg x 2) on Mon, Wed, Fri; 5 mg (5 mg x 1) all other days Weekly warfarin total:   50 mg  
Plan last modified:   Raúl Marinelli RN (2019) Next INR check:   3/11/2019 Target end date:     
 Indications Impending cerebrovascular accident [facility]) [I63.9] Long term (current) use of anticoagulants [Z79.01] Anticoagulation Episode Summary INR check location:     
 Preferred lab:     
 Send INR reminders to:     
 Comments: Anticoagulation Care Providers Provider Role Specialty Phone number Tony Marcos MD Responsible Cardiology [phone] Future Appointments Date Time Provider Jack Lopez 3/4/2019 11:40 AM Tony Marcos  [address]  
3/11/2019  8:40 AM TRACIE, [address] Il[address]

## 2019-03-04 ENCOUNTER — OFFICE VISIT (OUTPATIENT)
Dept: CARDIOLOGY CLINIC | Age: 64
End: 2019-03-04

## 2019-03-04 VITALS
DIASTOLIC BLOOD PRESSURE: 68 MMHG | RESPIRATION RATE: 18 BRPM | HEIGHT: 74 IN | WEIGHT: 290.2 LBS | OXYGEN SATURATION: 98 % | BODY MASS INDEX: 37.24 KG/M2 | SYSTOLIC BLOOD PRESSURE: 120 MMHG | HEART RATE: 57 BPM

## 2019-03-04 DIAGNOSIS — E66.01 OBESITY, MORBID (HCC): ICD-10-CM

## 2019-03-04 DIAGNOSIS — I25.10 ATHEROSCLEROSIS OF NATIVE CORONARY ARTERY OF NATIVE HEART WITHOUT ANGINA PECTORIS: Primary | ICD-10-CM

## 2019-03-04 DIAGNOSIS — I10 HYPERTENSION, UNSPECIFIED TYPE: ICD-10-CM

## 2019-03-04 DIAGNOSIS — R94.30 ABNORMAL CARDIAC FUNCTION TEST: ICD-10-CM

## 2019-03-04 DIAGNOSIS — I25.5 ISCHEMIC CARDIOMYOPATHY: ICD-10-CM

## 2019-03-04 DIAGNOSIS — E78.2 MIXED HYPERLIPIDEMIA: ICD-10-CM

## 2019-03-04 RX ORDER — LISINOPRIL 10 MG/1
10 TABLET ORAL DAILY
Qty: 90 TAB | Refills: 1 | Status: SHIPPED | OUTPATIENT
Start: 2019-03-04 | End: 2019-07-26 | Stop reason: ALTCHOICE

## 2019-03-04 NOTE — PROGRESS NOTES
HISTORY OF PRESENT ILLNESS Gen Escoto is a 58 y.o. male SUMMARY:  
Problem List  Date Reviewed: 3/4/2019 Codes Class Noted Impending cerebrovascular accident [facility]) ICD-10-CM: I63.9 ICD-9-CM: 435.9  2/8/2019 Long term (current) use of anticoagulants ICD-10-CM: Z79.01 
ICD-9-CM: V58.61  2/8/2019 Obesity, morbid ([address] ICD-10-CM: E66.01 
ICD-9-CM: 278.01  2/4/2019 CVA (cerebral vascular accident) [facility]) ICD-10-CM: I63.9 ICD-9-CM: 434.91  1/23/2019 Hyperlipidemia ICD-10-CM: E78.5 ICD-9-CM: 272.4  4/24/2012 Coronary atherosclerosis of native coronary artery ICD-10-CM: I25.10 ICD-9-CM: 414.01  4/24/2012 Acute anterior wall MI [facility]) ICD-10-CM: I21.09 
ICD-9-CM: 410.10  4/10/2012 Overview Addendum 4/24/2012  3:40 PM by Debbie Dixon MD  
  4/12 100% mid lad treated with single suly. 60% om1, 80% prox rca. Current Outpatient Medications on File Prior to Visit Medication Sig  warfarin (COUMADIN) 5 mg tablet Take 2 tabs on M/W/F and 1 tab all other days or as directed by coumadin clinic  carvedilol (COREG) 6.25 mg tablet Take 1 Tab by mouth two (2) times daily (with meals).  lisinopril (PRINIVIL, ZESTRIL) 5 mg tablet Take 1 Tab by mouth daily.  spironolactone (ALDACTONE) 25 mg tablet Take 1 Tab by mouth daily.  aspirin 81 mg chewable tablet Take 1 Tab by mouth daily.  atorvastatin (LIPITOR) 40 mg tablet Take 1 Tab by mouth nightly. No current facility-administered medications on file prior to visit. CARDIOLOGY STUDIES TO DATE: 
1/19 echo dilated lv with lvef 25-30%, lae, mild aortic stenosis 1/19 cardiac cath: LAD widely patent stent, no significant disease. LCX co-dominant, first marginal has multiple high grade lesions including at ostium. Ramus small with tight lesion, 2.0 vessel. RCA small with multiple lesions proximal and mid.   LV dilated with apical akinesis and severe global hypokinesis, EF 20%, LVEDP 40-45. Chief Complaint Patient presents with  Coronary Artery Disease HPI : 
Mr. Ashly Correia is doing great and is asymptomatic from a cardiac perspective. No problems with his medication and he is keeping up with his Coumadin checks and wearing his Life Vest.   
 
 
 
CARDIAC ROS:  
negative for chest pain, dyspnea, palpitations, syncope, orthopnea, paroxysmal nocturnal dyspnea, exertional chest pressure/discomfort, claudication, lower extremity edema No family history on file. Past Medical History:  
Diagnosis Date  CAD (coronary artery disease) stent, MI 4-10-12 GENERAL ROS: 
A comprehensive review of systems was negative except for that written in the HPI. Visit Vitals /68 (BP 1 Location: Right arm, BP Patient Position: Sitting) Pulse (!) 57 Resp 18 Ht 6' 1.5\" (1.867 m) Wt 290 lb 3.2 oz (131.6 kg) SpO2 98% BMI 37.77 kg/m² Wt Readings from Last 3 Encounters:  
03/04/19 290 lb 3.2 oz (131.6 kg) 02/04/19 307 lb 6.4 oz (139.4 kg) 01/28/19 305 lb 12.5 oz (138.7 kg) BP Readings from Last 3 Encounters:  
03/04/19 120/68  
02/04/19 124/86  
01/28/19 123/72 PHYSICAL EXAM 
General appearance: alert, cooperative, no distress, appears stated age Neurologic: Alert and oriented X 3 Neck: supple, symmetrical, trachea midline, no adenopathy, no carotid bruit and no JVD Lungs: clear to auscultation bilaterally Heart: regular rate and rhythm, S1, S2 normal, no murmur, click, rub or gallop Extremities: extremities normal, atraumatic, no cyanosis or edema Lab Results Component Value Date/Time  Cholesterol, total 191 01/23/2019 08:30 AM  
 Cholesterol, total 180 04/11/2012 04:18 AM  
 HDL Cholesterol 52 01/23/2019 08:30 AM  
 HDL Cholesterol 36 04/11/2012 04:18 AM  
 LDL, calculated 125 (H) 01/23/2019 08:30 AM  
 LDL, calculated 127 (H) 04/11/2012 04:18 AM  
 Triglyceride 70 01/23/2019 08:30 AM  
 Triglyceride 85 04/11/2012 04:18 AM  
 CHOL/HDL Ratio 3.7 01/23/2019 08:30 AM  
 CHOL/HDL Ratio 5.0 04/11/2012 04:18 AM  
 
ASSESSMENT Mr. Nakia Portillo is stable, asymptomatic and well compensated on a good medical regimen and needs no cardiac testing at this time. I do not think he will tolerate any more Coreg, but we are going to double his Lisinopril and we gave him written instructions in that regard. He will need a follow-up echocardiogram around the first of May.   
 
 
 
 
 
 current treatment plan is effective, no change in therapy 
lab results and schedule of future lab studies reviewed with patient 
reviewed diet, exercise and weight control Encounter Diagnoses Name Primary?  Atherosclerosis of native coronary artery of native heart without angina pectoris Yes  Mixed hyperlipidemia  Hypertension, unspecified type  Abnormal cardiac function test   
 Ischemic cardiomyopathy Orders Placed This Encounter  DISCONTD: rosuvastatin (CRESTOR) 10 mg tablet Follow-up Disposition: 
Return in about 2 months (around 5/4/2019). Abimbola Gallagher MD3/4/2019

## 2019-03-04 NOTE — PROGRESS NOTES
Requested Prescriptions Signed Prescriptions Disp Refills  lisinopril (PRINIVIL, ZESTRIL) 10 mg tablet 90 Tab 1 Sig: Take 1 Tab by mouth daily. Dose increased to 10 mg/day.

## 2019-03-04 NOTE — PROGRESS NOTES
1. Have you been to the ER, urgent care clinic since your last visit? Hospitalized since your last visit? No 
 
2. Have you seen or consulted any other health care providers outside of the [address] [place] [address] since your last visit? Include any pap smears or colon screening. No 
 
3) Do you have an Advance Directive on file? no 
 
Patient is accompanied by self I have received verbal consent from James Adorno to discuss any/all medical information while they are present in the room.

## 2019-03-11 ENCOUNTER — ANTI-COAG VISIT (OUTPATIENT)
Dept: CARDIOLOGY CLINIC | Age: 64
End: 2019-03-11

## 2019-03-11 DIAGNOSIS — Z79.01 LONG TERM (CURRENT) USE OF ANTICOAGULANTS: ICD-10-CM

## 2019-03-11 DIAGNOSIS — I63.9 IMPENDING CEREBROVASCULAR ACCIDENT (HCC): ICD-10-CM

## 2019-03-11 LAB
INR BLD: 3.2
PT POC: NORMAL SECONDS
VALID INTERNAL CONTROL?: YES

## 2019-03-22 ENCOUNTER — ANTI-COAG VISIT (OUTPATIENT)
Dept: CARDIOLOGY CLINIC | Age: 64
End: 2019-03-22

## 2019-03-22 DIAGNOSIS — I63.9 IMPENDING CEREBROVASCULAR ACCIDENT (HCC): ICD-10-CM

## 2019-03-22 DIAGNOSIS — Z79.01 LONG TERM (CURRENT) USE OF ANTICOAGULANTS: ICD-10-CM

## 2019-03-22 LAB
INR BLD: 3.9
PT POC: NORMAL SECONDS
VALID INTERNAL CONTROL?: YES

## 2019-03-22 NOTE — PROGRESS NOTES
A full discussion of the nature of anticoagulants has been carried out. A benefit risk analysis has been presented to the patient, so that they understand the justification for choosing anticoagulation at this time. The need for frequent and regular monitoring, precise dosage adjustment and compliance is stressed. Side effects of potential bleeding are discussed. The patient should avoid any OTC items containing aspirin or ibuprofen, and should avoid great swings in general diet. Avoid alcohol consumption. Call if any signs of abnormal bleeding. Next PT/INR test in 1 week. Patient travels a lot for work. Also reports a recent 40 lb weight loss. Instructed to decrease dose to 5 mg daily except Wednesdays take 2 tabs. Written and verbal instructions given to patient. Patient verbalized understanding TWD decreased 11.1% Anticoagulation Summary  As of 3/22/2019 INR goal:   2.0-3.0  
TTR:   27.9 % (1.1 mo) INR used for dosing:   3.9! (3/22/2019) Warfarin maintenance plan:   10 mg (5 mg x 2) every Wed; 5 mg (5 mg x 1) all other days Weekly warfarin total:   40 mg  
Plan last modified:   Valery Mcarthur RN (3/22/2019) Next INR check:   4/3/2019 Target end date:     
 Indications Impending cerebrovascular accident [facility]) [I63.9] Long term (current) use of anticoagulants [Z79.01] Anticoagulation Episode Summary INR check location:     
 Preferred lab:     
 Send INR reminders to:     
 Comments: Anticoagulation Care Providers Provider Role Specialty Phone number Miguelangel Smith MD [facility] Cardiology [phone] Future Appointments Date Time Provider Jack Lopez 3/22/2019 11:00 AM COUMADIN, YANES CAVREY BETSY SCHED  
4/3/2019  8:00 AM COUMADIN, YANES CAVREY BETSY SCHED  
4/22/2019  9:40 AM iMguelangel Smith  [address]

## 2019-04-03 ENCOUNTER — ANTI-COAG VISIT (OUTPATIENT)
Dept: CARDIOLOGY CLINIC | Age: 64
End: 2019-04-03

## 2019-04-03 DIAGNOSIS — I63.9 IMPENDING CEREBROVASCULAR ACCIDENT (HCC): ICD-10-CM

## 2019-04-03 DIAGNOSIS — Z79.01 LONG TERM (CURRENT) USE OF ANTICOAGULANTS: ICD-10-CM

## 2019-04-03 LAB
INR BLD: 2.5
PT POC: NORMAL SECONDS
VALID INTERNAL CONTROL?: YES

## 2019-04-03 NOTE — PROGRESS NOTES
A full discussion of the nature of anticoagulants has been carried out. A benefit risk analysis has been presented to the patient, so that they understand the justification for choosing anticoagulation at this time. The need for frequent and regular monitoring, precise dosage adjustment and compliance is stressed. Side effects of potential bleeding are discussed. The patient should avoid any OTC items containing aspirin or ibuprofen, and should avoid great swings in general diet. Avoid alcohol consumption. Call if any signs of abnormal bleeding. Next PT/INR test in 2 weeks with Dr. Luisito Calvillo appt per patient request. 
 
Continue current dosing regimen. Patient verbalized understanding Anticoagulation Summary  As of 4/3/2019 INR goal:   2.0-3.0  
TTR:   30.0 % (1.5 mo) INR used for dosin.5 (4/3/2019) Warfarin maintenance plan:   10 mg (5 mg x 2) every Wed; 5 mg (5 mg x 1) all other days Weekly warfarin total:   40 mg  
Plan last modified:   Eduardo Chaves RN (3/22/2019) Next INR check:   2019 Target end date:     
 Indications Impending cerebrovascular accident [facility]) [I63.9] Long term (current) use of anticoagulants [Z79.01] Anticoagulation Episode Summary INR check location:     
 Preferred lab:     
 Send INR reminders to:     
 Comments: Anticoagulation Care Providers Provider Role Specialty Phone number Babita Pham MD [facility] Cardiology [phone] Future Appointments Date Time Provider Jack Lopez 2019  9:20 AM COUMADINFARZANA  
2019  9:40 AM Babita Pham  [address]

## 2019-04-24 ENCOUNTER — OFFICE VISIT (OUTPATIENT)
Dept: CARDIOLOGY CLINIC | Age: 64
End: 2019-04-24

## 2019-04-24 ENCOUNTER — ANTI-COAG VISIT (OUTPATIENT)
Dept: CARDIOLOGY CLINIC | Age: 64
End: 2019-04-24

## 2019-04-24 VITALS
HEART RATE: 68 BPM | RESPIRATION RATE: 16 BRPM | HEIGHT: 74 IN | DIASTOLIC BLOOD PRESSURE: 60 MMHG | SYSTOLIC BLOOD PRESSURE: 100 MMHG | OXYGEN SATURATION: 97 % | BODY MASS INDEX: 34.24 KG/M2 | WEIGHT: 266.8 LBS

## 2019-04-24 DIAGNOSIS — E66.01 OBESITY, MORBID (HCC): Primary | ICD-10-CM

## 2019-04-24 DIAGNOSIS — Z79.01 LONG TERM (CURRENT) USE OF ANTICOAGULANTS: ICD-10-CM

## 2019-04-24 DIAGNOSIS — I25.10 ATHEROSCLEROSIS OF NATIVE CORONARY ARTERY OF NATIVE HEART WITHOUT ANGINA PECTORIS: ICD-10-CM

## 2019-04-24 DIAGNOSIS — I25.5 ISCHEMIC CARDIOMYOPATHY: ICD-10-CM

## 2019-04-24 DIAGNOSIS — I63.9 IMPENDING CEREBROVASCULAR ACCIDENT (HCC): ICD-10-CM

## 2019-04-24 DIAGNOSIS — I10 HYPERTENSION, UNSPECIFIED TYPE: ICD-10-CM

## 2019-04-24 DIAGNOSIS — E78.2 MIXED HYPERLIPIDEMIA: ICD-10-CM

## 2019-04-24 LAB
INR BLD: 1.3
PT POC: NORMAL SECONDS
VALID INTERNAL CONTROL?: YES

## 2019-04-24 NOTE — PROGRESS NOTES
HISTORY OF PRESENT ILLNESS  Vic Nuñez is a 58 y.o. male     SUMMARY:   Problem List  Date Reviewed: 4/24/2019          Codes Class Noted    Impending cerebrovascular accident [facility]) ICD-10-CM: I63.9  ICD-9-CM: 435.9  2/8/2019        Long term (current) use of anticoagulants ICD-10-CM: Z79.01  ICD-9-CM: V58.61  2/8/2019        Obesity, morbid ([address] ICD-10-CM: E66.01  ICD-9-CM: 278.01  2/4/2019        CVA (cerebral vascular accident) [facility]) ICD-10-CM: I63.9  ICD-9-CM: 434.91  1/23/2019        Hyperlipidemia ICD-10-CM: E78.5  ICD-9-CM: 272.4  4/24/2012        Coronary atherosclerosis of native coronary artery ICD-10-CM: I25.10  ICD-9-CM: 414.01  4/24/2012        Acute anterior wall MI [facility]) ICD-10-CM: I21.09  ICD-9-CM: 410.10  4/10/2012    Overview Addendum 4/24/2012  3:40 PM by Devora Ferguson MD     4/12 100% mid lad treated with single suly. 60% om1, 80% prox rca. Current Outpatient Medications on File Prior to Visit   Medication Sig    lisinopril (PRINIVIL, ZESTRIL) 10 mg tablet Take 1 Tab by mouth daily.  warfarin (COUMADIN) 5 mg tablet Take 2 tabs on M/W/F and 1 tab all other days or as directed by coumadin clinic (Patient taking differently: Take 2 tabs on Mon/Wed and 1 tab all other days or as directed by coumadin clinic)    carvedilol (COREG) 6.25 mg tablet Take 1 Tab by mouth two (2) times daily (with meals).  spironolactone (ALDACTONE) 25 mg tablet Take 1 Tab by mouth daily.  aspirin 81 mg chewable tablet Take 1 Tab by mouth daily.  atorvastatin (LIPITOR) 40 mg tablet Take 1 Tab by mouth nightly. No current facility-administered medications on file prior to visit. CARDIOLOGY STUDIES TO DATE:  4/12 echo lvef 50% with mild anterior hypo  1/19 echo dilated lv with lvef 25-30%, lae, mild aortic stenosis  1/19 cardiac cath: LAD widely patent stent, no significant disease.   LCX co-dominant, first marginal has multiple high grade lesions including at ostium. Ramus small with tight lesion, 2.0 vessel. RCA small with multiple lesions proximal and mid. LV dilated with apical akinesis and severe global hypokinesis, EF 20%, LVEDP 40-45. Chief Complaint   Patient presents with    Coronary Artery Disease     HPI :  Mr. David Smith is doing great. He has lost about 40 pounds since the first of February and plans on continuing to lose. He is walking two to three miles a day with no worrisome symptoms or problems with his medications. CARDIAC ROS:   negative for chest pain, dyspnea, palpitations, syncope, orthopnea, paroxysmal nocturnal dyspnea, exertional chest pressure/discomfort, claudication, lower extremity edema    No family history on file. Past Medical History:   Diagnosis Date    CAD (coronary artery disease)     stent, MI 4-10-12       GENERAL ROS:  A comprehensive review of systems was negative except for that written in the HPI.     Visit Vitals  /60 (BP 1 Location: Left arm, BP Patient Position: Sitting)   Pulse 68   Resp 16   Ht 6' 1.5\" (1.867 m)   Wt 266 lb 12.8 oz (121 kg)   SpO2 97%   BMI 34.72 kg/m²       Wt Readings from Last 3 Encounters:   04/24/19 266 lb 12.8 oz (121 kg)   03/04/19 290 lb 3.2 oz (131.6 kg)   02/04/19 307 lb 6.4 oz (139.4 kg)            BP Readings from Last 3 Encounters:   04/24/19 100/60   03/04/19 120/68   02/04/19 124/86       PHYSICAL EXAM  General appearance: alert, cooperative, no distress, appears stated age  Neurologic: Alert and oriented X 3  Neck: supple, symmetrical, trachea midline, no adenopathy, no carotid bruit and no JVD  Lungs: clear to auscultation bilaterally  Heart: regular rate and rhythm, S1, S2 normal, no murmur, click, rub or gallop  Extremities: extremities normal, atraumatic, no cyanosis or edema    Lab Results   Component Value Date/Time    Cholesterol, total 191 01/23/2019 08:30 AM    Cholesterol, total 180 04/11/2012 04:18 AM    HDL Cholesterol 52 01/23/2019 08:30 AM    HDL Cholesterol 36 04/11/2012 04:18 AM    LDL, calculated 125 (H) 01/23/2019 08:30 AM    LDL, calculated 127 (H) 04/11/2012 04:18 AM    Triglyceride 70 01/23/2019 08:30 AM    Triglyceride 85 04/11/2012 04:18 AM    CHOL/HDL Ratio 3.7 01/23/2019 08:30 AM    CHOL/HDL Ratio 5.0 04/11/2012 04:18 AM     ASSESSMENT  Mr. Nixon Davis is stable and remarkably well compensated on a good medical regimen. I do not think we can push his medicines any further given his low blood pressure. We will schedule him for an echocardiogram and we talked about the possibility he might need an ICD in place of a Life Vest.        current treatment plan is effective, no change in therapy  lab results and schedule of future lab studies reviewed with patient  reviewed diet, exercise and weight control    Encounter Diagnoses   Name Primary?  Obesity, morbid ([facility] [address] Yes    Atherosclerosis of native coronary artery of native heart without angina pectoris     Hypertension, unspecified type     Ischemic cardiomyopathy     Mixed hyperlipidemia      No orders of the defined types were placed in this encounter. Follow-up and Dispositions    · Return in about 3 months (around 7/24/2019).          Dalia Ortiz MD  4/24/2019

## 2019-04-24 NOTE — PROGRESS NOTES
A full discussion of the nature of anticoagulants has been carried out. A benefit risk analysis has been presented to the patient, so that they understand the justification for choosing anticoagulation at this time. The need for frequent and regular monitoring, precise dosage adjustment and compliance is stressed. Side effects of potential bleeding are discussed. The patient should avoid any OTC items containing aspirin or ibuprofen, and should avoid great swings in general diet. Avoid alcohol consumption. Call if any signs of abnormal bleeding. Next PT/INR test in 1 week. Patient reports no missed doses despite subtherapeutic INR level. Patients level was 2.5 last check. Instructed to increase dose to 5 mg daily except Mon/Thurs take 2 tabs (10 mg). Written and verbal instructions given to patient. Patient verbalized understanding TWD increased 12.5% Anticoagulation Summary  As of 2019 INR goal:   2.0-3.0  
TTR:   33.8 % (2.2 mo) INR used for dosin.3! (2019) Warfarin maintenance plan:   10 mg (5 mg x 2) every Mon, Thu; 5 mg (5 mg x 1) all other days Weekly warfarin total:   45 mg  
Plan last modified:   Aracelis Asencio RN (2019) Next INR check:     
Target end date:     
 Indications Impending cerebrovascular accident [facility]) [I63.9] Long term (current) use of anticoagulants [Z79.01] Anticoagulation Episode Summary INR check location:     
 Preferred lab:     
 Send INR reminders to:     
 Comments: Anticoagulation Care Providers Provider Role Specialty Phone number Bernardino Perez MD [facility] Cardiology [phone] Future Appointments Date Time Provider Jack Jessica 2019  2:40 PM Bernardino Perez  [address]  
5/3/2019  9:00 AM COUMADIN, [address] Iliana [address]den

## 2019-05-03 ENCOUNTER — ANTI-COAG VISIT (OUTPATIENT)
Dept: CARDIOLOGY CLINIC | Age: 64
End: 2019-05-03

## 2019-05-03 DIAGNOSIS — I25.10 ATHEROSCLEROSIS OF NATIVE CORONARY ARTERY OF NATIVE HEART WITHOUT ANGINA PECTORIS: ICD-10-CM

## 2019-05-03 DIAGNOSIS — I10 HYPERTENSION, UNSPECIFIED TYPE: Primary | ICD-10-CM

## 2019-05-03 DIAGNOSIS — E78.2 MIXED HYPERLIPIDEMIA: ICD-10-CM

## 2019-05-03 DIAGNOSIS — I63.9 IMPENDING CEREBROVASCULAR ACCIDENT (HCC): ICD-10-CM

## 2019-05-03 DIAGNOSIS — Z79.01 LONG TERM (CURRENT) USE OF ANTICOAGULANTS: ICD-10-CM

## 2019-05-03 LAB
INR BLD: 1.6
PT POC: NORMAL SECONDS
VALID INTERNAL CONTROL?: YES

## 2019-05-03 RX ORDER — ATORVASTATIN CALCIUM 40 MG/1
40 TABLET, FILM COATED ORAL
Qty: 90 TAB | Refills: 1 | Status: SHIPPED | OUTPATIENT
Start: 2019-05-03 | End: 2019-08-15 | Stop reason: SDUPTHER

## 2019-05-03 RX ORDER — GUAIFENESIN 100 MG/5ML
81 LIQUID (ML) ORAL DAILY
Qty: 90 TAB | Refills: 1 | Status: SHIPPED | OUTPATIENT
Start: 2019-05-03 | End: 2019-08-15 | Stop reason: SDUPTHER

## 2019-05-03 NOTE — PROGRESS NOTES
A full discussion of the nature of anticoagulants has been carried out. A benefit risk analysis has been presented to the patient, so that they understand the justification for choosing anticoagulation at this time. The need for frequent and regular monitoring, precise dosage adjustment and compliance is stressed. Side effects of potential bleeding are discussed. The patient should avoid any OTC items containing aspirin or ibuprofen, and should avoid great swings in general diet. Avoid alcohol consumption. Call if any signs of abnormal bleeding. Next PT/INR test in 1 week. Instructed to increase dose to 10 mg on // and 5 mg all other days. Written and verbal instructions given to patient. Patient verbalized understanding TWD increased 11.1% Anticoagulation Summary  As of 5/3/2019 INR goal:   2.0-3.0  
TTR:   29.8 % (2.5 mo) INR used for dosin.6! (5/3/2019) Warfarin maintenance plan:   10 mg (5 mg x 2) every Mon, Wed, Fri; 5 mg (5 mg x 1) all other days Weekly warfarin total:   50 mg  
Plan last modified:   Sharon Sid, RN (5/3/2019) Next INR check:     
Target end date:     
 Indications Impending cerebrovascular accident [facility]) [I63.9] Long term (current) use of anticoagulants [Z79.01] Anticoagulation Episode Summary INR check location:     
 Preferred lab:     
 Send INR reminders to:     
 Comments: Anticoagulation Care Providers Provider Role Specialty Phone number Linda Cardona MD [facility] Cardiology [phone] Future Appointments Date Time Provider Jack Lopez 5/3/2019  9:00 AM COUMFARZANA LEZAMA SCHED  
2019  9:20 AM Linda Cardona  [address]

## 2019-05-03 NOTE — TELEPHONE ENCOUNTER
Requested Prescriptions     Signed Prescriptions Disp Refills    aspirin 81 mg chewable tablet 90 Tab 1     Sig: Take 1 Tab by mouth daily. Authorizing Provider: Virgina Olszewski     Ordering User: Mariposa Leon atorvastatin (LIPITOR) 40 mg tablet 90 Tab 1     Sig: Take 1 Tab by mouth nightly.      Authorizing Provider: Virgina Olszewski     Ordering User: Ivory Scott verbal orders

## 2019-05-06 ENCOUNTER — TELEPHONE (OUTPATIENT)
Dept: CARDIOLOGY CLINIC | Age: 64
End: 2019-05-06

## 2019-05-06 NOTE — TELEPHONE ENCOUNTER
Called patient. Verified patient's identity with two identifiers. Notified patient of results and Dr. Dejan Oneal message. Patient agreed to make appointment to see Dr. Whitney Weaver. Appointment scheduled for June 6th. Patient verbalized understanding and denied further questions or concerns.        Future Appointments   Date Time Provider Jack Lopez   5/13/2019  7:40 AM COUMADIN, [address]   6/6/2019  9:00 AM Alejandra Celaya  [address] 7/26/2019  9:20 AM Mony Cage  [address]

## 2019-05-06 NOTE — TELEPHONE ENCOUNTER
----- Message from Brielle Salcedo MD sent at 5/6/2019  2:14 PM EDT -----  Heart muscle is better, but right on borderline for needing defib.  Should be ok to stop lifevest, but I want him to meet with dr treviño to discuss possible defib, danielle if things dont continue to improve

## 2019-05-13 ENCOUNTER — ANTI-COAG VISIT (OUTPATIENT)
Dept: CARDIOLOGY CLINIC | Age: 64
End: 2019-05-13

## 2019-05-13 DIAGNOSIS — Z79.01 LONG TERM (CURRENT) USE OF ANTICOAGULANTS: ICD-10-CM

## 2019-05-13 DIAGNOSIS — I63.9 IMPENDING CEREBROVASCULAR ACCIDENT (HCC): ICD-10-CM

## 2019-05-13 LAB
INR BLD: 2
PT POC: NORMAL SECONDS
VALID INTERNAL CONTROL?: YES

## 2019-05-13 NOTE — PROGRESS NOTES
A full discussion of the nature of anticoagulants has been carried out. A benefit risk analysis has been presented to the patient, so that they understand the justification for choosing anticoagulation at this time. The need for frequent and regular monitoring, precise dosage adjustment and compliance is stressed. Side effects of potential bleeding are discussed. The patient should avoid any OTC items containing aspirin or ibuprofen, and should avoid great swings in general diet. Avoid alcohol consumption. Call if any signs of abnormal bleeding. Next PT/INR test in 2 weeks. Patient eats only vegetables and greens. Will take 10 mg daily except M/W/F take 1 tab. Recheck levels in 2 weeks. Patient verbalized understanding Anticoagulation Summary  As of 2019 INR goal:   2.0-3.0  
TTR:   26.2 % (2.8 mo) INR used for dosin.0 (2019) Warfarin maintenance plan:   5 mg (5 mg x 1) every Mon, Wed, Fri; 10 mg (5 mg x 2) all other days Weekly warfarin total:   55 mg  
Plan last modified:   Sharon Lau RN (2019) Next INR check:   2019 Target end date:     
 Indications Impending cerebrovascular accident [facility]) [I63.9] Long term (current) use of anticoagulants [Z79.01] Anticoagulation Episode Summary INR check location:     
 Preferred lab:     
 Send INR reminders to:     
 Comments: Anticoagulation Care Providers Provider Role Specialty Phone number Linda Cardona MD [facility] Cardiology [phone] Future Appointments Date Time Provider Jack Lopez 2019  9:40 AM COUMADINFARZANA SCHED  
2019  9:00 AM Austin Miller  [address]  
2019  9:20 AM Linda Cardona  [address]

## 2019-05-31 ENCOUNTER — ANTI-COAG VISIT (OUTPATIENT)
Dept: CARDIOLOGY CLINIC | Age: 64
End: 2019-05-31

## 2019-05-31 DIAGNOSIS — I63.9 IMPENDING CEREBROVASCULAR ACCIDENT (HCC): ICD-10-CM

## 2019-05-31 DIAGNOSIS — Z79.01 LONG TERM (CURRENT) USE OF ANTICOAGULANTS: ICD-10-CM

## 2019-05-31 LAB
INR BLD: 2
PT POC: NORMAL SECONDS
VALID INTERNAL CONTROL?: YES

## 2019-05-31 NOTE — PROGRESS NOTES
A full discussion of the nature of anticoagulants has been carried out. A benefit risk analysis has been presented to the patient, so that they understand the justification for choosing anticoagulation at this time. The need for frequent and regular monitoring, precise dosage adjustment and compliance is stressed. Side effects of potential bleeding are discussed. The patient should avoid any OTC items containing aspirin or ibuprofen, and should avoid great swings in general diet. Avoid alcohol consumption. Call if any signs of abnormal bleeding. Next PT/INR test in 1 month. Continue current dosing regimen.    Patient verbalized understanding      Anticoagulation Summary  As of 2019    INR goal:   2.0-3.0   TTR:   39.0 % (3.4 mo)   INR used for dosin.0 (2019)   Warfarin maintenance plan:   5 mg (5 mg x 1) every Mon, Wed, Fri; 10 mg (5 mg x 2) all other days   Weekly warfarin total:   55 mg   Plan last modified:   Miguel Rutherford RN (2019)   Next INR check:      Target end date:       Indications    Impending cerebrovascular accident [facility]) [I63.9]  Long term (current) use of anticoagulants [Z79.01]             Anticoagulation Episode Summary     INR check location:       Preferred lab:       Send INR reminders to:       Comments:         Anticoagulation Care Providers     Provider Role Specialty Phone number    Lana Joyce MD [facility] Cardiology [phone]          Future Appointments   Date Time Provider Jack Lopez   2019  9:00 AM Luis Aguirre  [address]   2019  9:00 AM COUMADIN, [address] [facility]   2019  9:20 AM Lana Joyce  [address]

## 2019-06-06 ENCOUNTER — OFFICE VISIT (OUTPATIENT)
Dept: CARDIOLOGY CLINIC | Age: 64
End: 2019-06-06

## 2019-06-06 VITALS
RESPIRATION RATE: 16 BRPM | HEART RATE: 60 BPM | OXYGEN SATURATION: 98 % | BODY MASS INDEX: 32.88 KG/M2 | HEIGHT: 74 IN | WEIGHT: 256.2 LBS | SYSTOLIC BLOOD PRESSURE: 130 MMHG | DIASTOLIC BLOOD PRESSURE: 84 MMHG

## 2019-06-06 DIAGNOSIS — I25.2 OLD MI (MYOCARDIAL INFARCTION): ICD-10-CM

## 2019-06-06 DIAGNOSIS — I10 HYPERTENSION, UNSPECIFIED TYPE: ICD-10-CM

## 2019-06-06 DIAGNOSIS — E66.01 OBESITY, MORBID (HCC): ICD-10-CM

## 2019-06-06 DIAGNOSIS — I25.5 ISCHEMIC CARDIOMYOPATHY: Primary | ICD-10-CM

## 2019-06-06 DIAGNOSIS — I25.10 ATHEROSCLEROSIS OF NATIVE CORONARY ARTERY OF NATIVE HEART WITHOUT ANGINA PECTORIS: ICD-10-CM

## 2019-06-06 NOTE — PROGRESS NOTES
Cardiac Electrophysiology OFFICE Note     Subjective:      Grace Gaming is a 58 y.o. patient who is seen for evaluation for ICD implant for primary prevention of sudden cardiac death in setting of ischemic cardiomyopathy. LVEF 20% in 01/2019 at time of cardiac cath, started carvedilol upon discharge. Lisinopril & spironolactone added in 02/2019. Recheck on echo showed LVEF 35%. Wore Life Vest upon discharge up until 05/2019. He has lost 40 lbs since 02/2019, walking 2-3 miles daily without difficulty. He has changed his diet to \"nothing with eyes & nothing white\". He is committed to further weight loss, believes that this plus healthy lifestyle will further increase LV function. NYHA I. Tolerating GDMT well, denies chest pain, palpitations, SOB, PND, orthopnea, lightheadedness, syncope, or edema. SBP typically 110s at home. Anticoagulated with warfarin per neuro recommendation, has INR monitoring here at [facility]. He continues ASA 81 mg po daily, denies bleeding issues. Previous:  Echo (05/03/2019): LVEF 35%, mod to severe segmental systolic dysfunction; akinetic mid anterior, mid anteroseptal, mid inferoseptal, apical anterior, apical septal, apical inferior, apical lateral, & apex; hypokinetic basal inferior & mid inferior. RV mildly dilated. Mild HARITHA. Mild MAC, mild MR. Aortic valve sclerosis. Cardiac cath (01/2019): LAD widely patent stent, no significant disease. LCX co-dominant, first marginal has multiple high grade lesions including at ostium. Ramus small with tight lesion, 2.0 vessel. RCA small with multiple lesions proximal and mid. LV dilated with apical akinesis and severe global hypokinesis, EF 20%, LVEDP 40-45. Echo (01/23/2019): LVEF 25-30%, mild to mod LV dilation, severe diffuse hypokinesis with distinct RWMA. LA mod to severely dilated. Mild MAC. Mild AS. NSTEMI 01/2019    CVA 01/2019.   MRI showed multiple foci consistent with embolic source. During admission for CVA & NSTEMI in 01/2019, had 13 beats NSVT with normal K & Mg. Anterior wall MI in 04/2012.  100% mid LAD treated with single SULY. 60% OM1, 80% prox RCA. Problem List  Date Reviewed: 6/6/2019          Codes Class Noted    Impending cerebrovascular accident [facility]) ICD-10-CM: I63.9  ICD-9-CM: 435.9  2/8/2019        Long term (current) use of anticoagulants ICD-10-CM: Z79.01  ICD-9-CM: V58.61  2/8/2019        Obesity, morbid ([facility] [address] ICD-10-CM: E66.01  ICD-9-CM: 278.01  2/4/2019        CVA (cerebral vascular accident) [facility]) ICD-10-CM: I63.9  ICD-9-CM: 434.91  1/23/2019        Hyperlipidemia ICD-10-CM: E78.5  ICD-9-CM: 272.4  4/24/2012        Coronary atherosclerosis of native coronary artery ICD-10-CM: I25.10  ICD-9-CM: 414.01  4/24/2012        Acute anterior wall MI [facility]) ICD-10-CM: I21.09  ICD-9-CM: 410.10  4/10/2012    Overview Addendum 4/24/2012  3:40 PM by Joy Saldana MD     4/12 100% mid lad treated with single suly. 60% om1, 80% prox rca. Current Outpatient Medications   Medication Sig Dispense Refill    aspirin 81 mg chewable tablet Take 1 Tab by mouth daily. 90 Tab 1    atorvastatin (LIPITOR) 40 mg tablet Take 1 Tab by mouth nightly. 90 Tab 1    lisinopril (PRINIVIL, ZESTRIL) 10 mg tablet Take 1 Tab by mouth daily. 90 Tab 1    warfarin (COUMADIN) 5 mg tablet Take 2 tabs on M/W/F and 1 tab all other days or as directed by coumadin clinic (Patient taking differently: Take 2 tabs daily except M/W/F take 1 tab  or as directed by coumadin clinic) 130 Tab 0    carvedilol (COREG) 6.25 mg tablet Take 1 Tab by mouth two (2) times daily (with meals). 180 Tab 1    spironolactone (ALDACTONE) 25 mg tablet Take 1 Tab by mouth daily.  80 Tab 1     No Known Allergies  Past Medical History:   Diagnosis Date    CAD (coronary artery disease)     stent, MI 4-10-12     Past Surgical History:   Procedure Laterality Date    CARDIAC SURG PROCEDURE UNLIST cath with stents     History reviewed. No pertinent family history. Social History     Tobacco Use    Smoking status: Never Smoker    Smokeless tobacco: Never Used   Substance Use Topics    Alcohol use: Yes     Frequency: 2-3 times a week     Comment: Weekly        Review of Systems:   Constitutional: Negative for fever, chills, weight loss, malaise/fatigue. HEENT: Negative for nosebleeds, vision changes. Respiratory: Negative for cough, hemoptysis  Cardiovascular: Negative for chest pain, palpitations, orthopnea, claudication, leg swelling, syncope, and PND. Gastrointestinal: Negative for nausea, vomiting, diarrhea, blood in stool and melena. Genitourinary: Negative for dysuria, and hematuria. Musculoskeletal: Negative for myalgias, arthralgia. Skin: Negative for rash. Heme: Does not bleed or bruise easily. Neurological: Negative for speech change and focal weakness     Objective:     Visit Vitals  /84 (BP 1 Location: Right arm, BP Patient Position: Sitting)   Pulse 60   Resp 16   Ht 6' 2\" (1.88 m)   Wt 256 lb 3.2 oz (116.2 kg)   SpO2 98%   BMI 32.89 kg/m²      Physical Exam:   Constitutional: well-developed and well-nourished. No respiratory distress. Head: Normocephalic and atraumatic. Eyes: Pupils are equal, round  ENT: hearing grossly normal  Neck: supple. No JVD present. Cardiovascular: Normal rate, regular rhythm. Exam reveals no gallop and no friction rub. No murmur heard. Pulmonary/Chest: Effort normal and breath sounds normal. No wheezes. Abdominal: Soft, no tenderness. Obese. Musculoskeletal: No edema. Neurological: Alert,oriented. Skin: Skin is warm and dry. Psychiatric: normal mood and affect. Behavior is normal. Judgment and thought content normal.        Assessment/Plan:        ICD-10-CM ICD-9-CM    1. Ischemic cardiomyopathy I25.5 414.8    2. Hypertension, unspecified type I10 401.9    3.  Atherosclerosis of native coronary artery of native heart without angina pectoris I25.10 414.01    4. Obesity, morbid ([address] E66.01 278.01    5. Old MI (myocardial infarction) I25.2 12      Mr. Leora Jackson has ischemic cardiomyopathy, history of 2 previous MIs, most recent NSTEMI in 01/2019. LVEF improved from approx 25% at that time to 35% on most recent echo in 05/2019 with GDMT since 01/2019. NSVT x 13 beats had been noted during hospital admission in 01/2019. NYHA I, is making lifestyle changes, losing weight, maintaining healthy diet, & exercising regularly. Tolerating GDMT well. Large areas of akinesis noted on most recent echo. Likelihood of regaining much more LV function is low. Even if LVEF raises above 35% threshold, he has significant myocardial scar, which would be substrate for VT. Risks/benefits if ICD discussed, including high risk for ventricular dysrhythmia & sudden cardiac death due to ischemic etiology & diffuse akinesis. Shared decision making was utilized between the physician/provider and patient/family in regards to ICD generator implantation and therapy. The patient & his wife would like to take some time to consider options, will contact us if they would like to schedule ICD implant. He does not want ICD now but wife understands risks of sudden death    Follow up with Dr. Leoncio Dodd as previously scheduled. Future Appointments   Date Time Provider Jack Lopez   6/28/2019  9:00 AM COUMADIN, [address]   7/26/2019  9:20 AM Giulia Danielson  [address]       Thank you for involving me in this patient's care and please call with further concerns or questions. Fiorella Quach M.D.   Electrophysiology/Cardiology  [facility] [place]  [address], [place] [address], [address]  [place], [facility] [address]                             [address] [place] [address]  [phone]

## 2019-06-06 NOTE — PROGRESS NOTES
Cardiac Electrophysiology OFFICE Note Subjective:  
  
Alexys Bryant is a 58 y.o. patient who is seen for evaluation for ICD implant for primary prevention of sudden cardiac death in setting of ischemic cardiomyopathy. LVEF 20% in 01/2019 at time of cardiac cath, started carvedilol upon discharge. Lisinopril & spironolactone added in 02/2019. Recheck on echo showed LVEF 35%. Wore Life Vest upon discharge up until 05/2019. He has lost 40 lbs since 02/2019, walking 2-3 miles daily without difficulty. He has changed his diet to \"nothing with eyes & nothing white\". He is committed to further weight loss, believes that this plus healthy lifestyle will further increase LV function. NYHA I. Tolerating GDMT well, denies chest pain, palpitations, SOB, PND, orthopnea, lightheadedness, syncope, or edema. SBP typically 110s at home. Anticoagulated with warfarin per neuro recommendation, has INR monitoring here at [facility]. He continues ASA 81 mg po daily, denies bleeding issues. Previous: 
Echo (05/03/2019): LVEF 35%, mod to severe segmental systolic dysfunction; akinetic mid anterior, mid anteroseptal, mid inferoseptal, apical anterior, apical septal, apical inferior, apical lateral, & apex; hypokinetic basal inferior & mid inferior. RV mildly dilated. Mild HARITHA. Mild MAC, mild MR. Aortic valve sclerosis. Cardiac cath (01/2019): LAD widely patent stent, no significant disease. LCX co-dominant, first marginal has multiple high grade lesions including at ostium. Ramus small with tight lesion, 2.0 vessel. RCA small with multiple lesions proximal and mid. LV dilated with apical akinesis and severe global hypokinesis, EF 20%, LVEDP 40-45. Echo (01/23/2019): LVEF 25-30%, mild to mod LV dilation, severe diffuse hypokinesis with distinct RWMA. LA mod to severely dilated. Mild MAC. Mild AS. NSTEMI 01/2019 CVA 01/2019. MRI showed multiple foci consistent with embolic source. During admission for CVA & NSTEMI in 01/2019, had 13 beats NSVT with normal K & Mg. Anterior wall MI in 04/2012.  100% mid LAD treated with single SULY. 60% OM1, 80% prox RCA. Problem List  Date Reviewed: 4/24/2019 Codes Class Noted Impending cerebrovascular accident [facility]) ICD-10-CM: I63.9 ICD-9-CM: 435.9  2/8/2019 Long term (current) use of anticoagulants ICD-10-CM: Z79.01 
ICD-9-CM: V58.61  2/8/2019 Obesity, morbid ([address] ICD-10-CM: E66.01 
ICD-9-CM: 278.01  2/4/2019 CVA (cerebral vascular accident) [facility]) ICD-10-CM: I63.9 ICD-9-CM: 434.91  1/23/2019 Hyperlipidemia ICD-10-CM: E78.5 ICD-9-CM: 272.4  4/24/2012 Coronary atherosclerosis of native coronary artery ICD-10-CM: I25.10 ICD-9-CM: 414.01  4/24/2012 Acute anterior wall MI [facility]) ICD-10-CM: I21.09 
ICD-9-CM: 410.10  4/10/2012 Overview Addendum 4/24/2012  3:40 PM by Dragan Patricia MD  
  4/12 100% mid lad treated with single suly. 60% om1, 80% prox rca. Current Outpatient Medications Medication Sig Dispense Refill  aspirin 81 mg chewable tablet Take 1 Tab by mouth daily. 90 Tab 1  
 atorvastatin (LIPITOR) 40 mg tablet Take 1 Tab by mouth nightly. 90 Tab 1  
 lisinopril (PRINIVIL, ZESTRIL) 10 mg tablet Take 1 Tab by mouth daily. 90 Tab 1  
 warfarin (COUMADIN) 5 mg tablet Take 2 tabs on M/W/F and 1 tab all other days or as directed by coumadin clinic (Patient taking differently: Take 2 tabs daily except M/W/F take 1 tab  or as directed by coumadin clinic) 130 Tab 0  carvedilol (COREG) 6.25 mg tablet Take 1 Tab by mouth two (2) times daily (with meals). 180 Tab 1  
 spironolactone (ALDACTONE) 25 mg tablet Take 1 Tab by mouth daily. 90 Tab 1 No Known Allergies Past Medical History:  
Diagnosis Date  CAD (coronary artery disease) stent, MI 4-10-12 Past Surgical History: Procedure Laterality Date  CARDIAC SURG PROCEDURE UNLIST    
 cath with stents No family history on file. Social History Tobacco Use  Smoking status: Never Smoker  Smokeless tobacco: Never Used Substance Use Topics  Alcohol use: Yes Frequency: 2-3 times a week Comment: Weekly Review of Systems:  
Constitutional: Negative for fever, chills, weight loss, malaise/fatigue. HEENT: Negative for nosebleeds, vision changes. Respiratory: Negative for cough, hemoptysis Cardiovascular: Negative for chest pain, palpitations, orthopnea, claudication, leg swelling, syncope, and PND. Gastrointestinal: Negative for nausea, vomiting, diarrhea, blood in stool and melena. Genitourinary: Negative for dysuria, and hematuria. Musculoskeletal: Negative for myalgias, arthralgia. Skin: Negative for rash. Heme: Does not bleed or bruise easily. Neurological: Negative for speech change and focal weakness Objective:  
 
Visit Vitals /84 (BP 1 Location: Right arm, BP Patient Position: Sitting) Pulse 60 Resp 16 Ht 6' 2\" (1.88 m) Wt 256 lb 3.2 oz (116.2 kg) SpO2 98% BMI 32.89 kg/m² Physical Exam:  
Constitutional: well-developed and well-nourished. No respiratory distress. Head: Normocephalic and atraumatic. Eyes: Pupils are equal, round ENT: hearing grossly normal 
Neck: supple. No JVD present. Cardiovascular: Normal rate, regular rhythm. Exam reveals no gallop and no friction rub. No murmur heard. Pulmonary/Chest: Effort normal and breath sounds normal. No wheezes. Abdominal: Soft, no tenderness. Obese. Musculoskeletal: No edema. Neurological: Alert,oriented. Skin: Skin is warm and dry. Psychiatric: normal mood and affect. Behavior is normal. Judgment and thought content normal.   
 
 
Assessment/Plan: ICD-10-CM ICD-9-CM 1. Ischemic cardiomyopathy I25.5 414.8 2. Hypertension, unspecified type I10 401.9 3. Atherosclerosis of native coronary artery of native heart without angina pectoris I25.10 414.01   
4. Obesity (BMI 30.0-34. 9) E66.9 278.00   
 
Mr. Ama White has ischemic cardiomyopathy, history of 2 previous MIs, most recent NSTEMI in 01/2019. LVEF improved from approx 25% at that time to 35% on most recent echo in 05/2019 with GDMT since 01/2019. NSVT x 13 beats had been noted during hospital admission in 01/2019. NYHA I, is making lifestyle changes, losing weight, maintaining healthy diet, & exercising regularly. Tolerating GDMT well. Large areas of akinesis noted on most recent echo. Likelihood of regaining much more LV function is low. Even if LVEF raises above 35% threshold, he has significant myocardial scar, which would be substrate for VT. Risks/benefits if ICD discussed, including high risk for ventricular dysrhythmia & sudden cardiac death due to ischemic etiology & diffuse akinesis. Shared decision making was utilized between the physician/provider and patient/family in regards to ICD generator implantation and therapy. The patient & his wife would like to take some time to consider options, will contact us if they would like to schedule ICD implant. Follow up with Dr. Chanel Kay as previously scheduled. Future Appointments Date Time Provider Jack Lopez 6/28/2019  9:00 AM FARZANA HODGES  
7/26/2019  9:20 AM Linda Cardona  [address] Thank you for involving me in this patient's care and please call with further concerns or questions. Bonnie Harmon M.D. Electrophysiology/Cardiology [facility] [place] [address], [place] [address], [facility] [address] 
[phone]                                        [phone]

## 2019-06-21 DIAGNOSIS — I25.10 ATHEROSCLEROSIS OF NATIVE CORONARY ARTERY OF NATIVE HEART WITHOUT ANGINA PECTORIS: ICD-10-CM

## 2019-06-21 DIAGNOSIS — E78.2 MIXED HYPERLIPIDEMIA: ICD-10-CM

## 2019-06-21 DIAGNOSIS — E66.01 OBESITY, MORBID (HCC): ICD-10-CM

## 2019-06-21 DIAGNOSIS — I25.5 ISCHEMIC CARDIOMYOPATHY: ICD-10-CM

## 2019-06-21 RX ORDER — WARFARIN SODIUM 5 MG/1
TABLET ORAL
Qty: 135 TAB | Refills: 0 | Status: SHIPPED | OUTPATIENT
Start: 2019-06-21 | End: 2019-09-27 | Stop reason: SDUPTHER

## 2019-06-21 NOTE — TELEPHONE ENCOUNTER
Requested Prescriptions     Signed Prescriptions Disp Refills    warfarin (COUMADIN) 5 mg tablet 135 Tab 0     Sig: Take 2 tabs daily except M/W/F take 1 tab  or as directed by coumadin clinic     Authorizing Provider: Aurea Rooney     Ordering User: Arely Fairbanks       Last office visit 4/24/19    Per Dr. Ramiro Schaffer   Date Time Provider Jack Lopez   6/28/2019  9:00 AM COUMADIN, [address]   7/26/2019  9:20 AM Cameron Zhou  [address]

## 2019-07-15 ENCOUNTER — ANTI-COAG VISIT (OUTPATIENT)
Dept: CARDIOLOGY CLINIC | Age: 64
End: 2019-07-15

## 2019-07-15 DIAGNOSIS — Z79.01 LONG TERM (CURRENT) USE OF ANTICOAGULANTS: ICD-10-CM

## 2019-07-15 DIAGNOSIS — I63.9 IMPENDING CEREBROVASCULAR ACCIDENT (HCC): ICD-10-CM

## 2019-07-15 LAB
INR BLD: 2.4
PT POC: NORMAL SECONDS
VALID INTERNAL CONTROL?: YES

## 2019-07-26 ENCOUNTER — OFFICE VISIT (OUTPATIENT)
Dept: CARDIOLOGY CLINIC | Age: 64
End: 2019-07-26

## 2019-07-26 VITALS
OXYGEN SATURATION: 97 % | BODY MASS INDEX: 32.91 KG/M2 | RESPIRATION RATE: 16 BRPM | WEIGHT: 256.4 LBS | SYSTOLIC BLOOD PRESSURE: 130 MMHG | DIASTOLIC BLOOD PRESSURE: 80 MMHG | HEART RATE: 60 BPM | HEIGHT: 74 IN

## 2019-07-26 DIAGNOSIS — I25.10 ATHEROSCLEROSIS OF NATIVE CORONARY ARTERY OF NATIVE HEART WITHOUT ANGINA PECTORIS: ICD-10-CM

## 2019-07-26 DIAGNOSIS — I25.5 ISCHEMIC CARDIOMYOPATHY: Primary | ICD-10-CM

## 2019-07-26 DIAGNOSIS — E78.2 MIXED HYPERLIPIDEMIA: ICD-10-CM

## 2019-07-26 RX ORDER — LISINOPRIL 20 MG/1
20 TABLET ORAL DAILY
COMMUNITY
End: 2019-07-26 | Stop reason: SDUPTHER

## 2019-07-26 RX ORDER — LISINOPRIL 20 MG/1
20 TABLET ORAL DAILY
Qty: 90 TAB | Refills: 3 | Status: SHIPPED | OUTPATIENT
Start: 2019-07-26 | End: 2020-08-14

## 2019-07-26 NOTE — PROGRESS NOTES
HISTORY OF PRESENT ILLNESS  Suzan Soriano is a 71 y.o. male     SUMMARY:   Problem List  Date Reviewed: 7/26/2019          Codes Class Noted    Impending cerebrovascular accident [facility]) ICD-10-CM: I63.9  ICD-9-CM: 435.9  2/8/2019        Long term (current) use of anticoagulants ICD-10-CM: Z79.01  ICD-9-CM: V58.61  2/8/2019        Obesity, morbid ([address] ICD-10-CM: E66.01  ICD-9-CM: 278.01  2/4/2019        CVA (cerebral vascular accident) [facility]) ICD-10-CM: I63.9  ICD-9-CM: 434.91  1/23/2019        Hyperlipidemia ICD-10-CM: E78.5  ICD-9-CM: 272.4  4/24/2012        Coronary atherosclerosis of native coronary artery ICD-10-CM: I25.10  ICD-9-CM: 414.01  4/24/2012        Acute anterior wall MI [facility]) ICD-10-CM: I21.09  ICD-9-CM: 410.10  4/10/2012    Overview Addendum 4/24/2012  3:40 PM by Wilfred Gilman MD     4/12 100% mid lad treated with single suly. 60% om1, 80% prox rca. Current Outpatient Medications on File Prior to Visit   Medication Sig    warfarin (COUMADIN) 5 mg tablet Take 2 tabs daily except M/W/F take 1 tab  or as directed by coumadin clinic (Patient taking differently: Take 2 tabs on  M/W/F take 1 tab  All other days or as directed by coumadin clinic)    aspirin 81 mg chewable tablet Take 1 Tab by mouth daily.  atorvastatin (LIPITOR) 40 mg tablet Take 1 Tab by mouth nightly.  carvedilol (COREG) 6.25 mg tablet Take 1 Tab by mouth two (2) times daily (with meals).  spironolactone (ALDACTONE) 25 mg tablet Take 1 Tab by mouth daily. No current facility-administered medications on file prior to visit. CARDIOLOGY STUDIES TO DATE:  4/12 echo lvef 50% with mild anterior hypo  1/19 echo dilated lv with lvef 25-30%, lae, mild aortic stenosis  1/19 cardiac cath: LAD widely patent stent, no significant disease. LCX co-dominant, first marginal has multiple high grade lesions including at ostium. Ramus small with tight lesion, 2.0 vessel.  RCA small with multiple lesions proximal and mid. LV dilated with apical akinesis and severe global hypokinesis, EF 20%, LVEDP 40-45.  05/03/19   ECHO ADULT COMPLETE 05/06/2019 5/6/2019  Narrative  · Left Ventricle: Moderate-to-severe segmental systolic dysfunction. Calculated left ventricular ejection fraction is 35%. Biplane method used to measure ejection fraction. · The following segments are akinetic: mid anterior, mid anteroseptal, mid inferoseptal, apical anterior, apical septal, apical inferior, apical lateral and apex. The following segments are hypokinetic: basal inferior and mid inferior. All other segments are normal.· Left Atrium: Mildly dilated left atrium. · Right Ventricle: Mildly dilated right ventricle. · Normal wall thickness and global systolic function. · Right Atrium: Mildly dilated right atrium. · Aortic Valve: Aortic valve sclerosis. · Mitral Valve: Mild mitral annular calcification. Mild mitral valve regurgitation. Signed by: Khloe Lugo MD    Chief Complaint   Patient presents with    Coronary Artery Disease     HPI :  Mr. Aurora Arevalo is doing well. He met with Dr. Joesph Horner and has elected not to proceed with a defibrillator at this time. He is exercising and trying to continue to lose weight. He has not had his lipids checked since January. CARDIAC ROS:   negative for chest pain, dyspnea, palpitations, syncope, orthopnea, paroxysmal nocturnal dyspnea, exertional chest pressure/discomfort, claudication, lower extremity edema    No family history on file. Past Medical History:   Diagnosis Date    CAD (coronary artery disease)     stent, MI 4-10-12       GENERAL ROS:  A comprehensive review of systems was negative except for that written in the HPI.     Visit Vitals  /80 (BP 1 Location: Left arm, BP Patient Position: Sitting)   Pulse 60   Resp 16   Ht 6' 2\" (1.88 m)   Wt 256 lb 6.4 oz (116.3 kg)   SpO2 97%   BMI 32.92 kg/m²       Wt Readings from Last 3 Encounters:   07/26/19 256 lb 6.4 oz (116.3 kg) 06/06/19 256 lb 3.2 oz (116.2 kg)   05/03/19 262 lb (118.8 kg)            BP Readings from Last 3 Encounters:   07/26/19 130/80   06/06/19 130/84   05/03/19 100/60       PHYSICAL EXAM  General appearance: alert, cooperative, no distress, appears stated age  Neurologic: Alert and oriented X 3  Neck: supple, symmetrical, trachea midline, no adenopathy, no carotid bruit and no JVD  Lungs: clear to auscultation bilaterally  Heart: regular rate and rhythm, S1, S2 normal, no murmur, click, rub or gallop  Extremities: extremities normal, atraumatic, no cyanosis or edema    Lab Results   Component Value Date/Time    Cholesterol, total 191 01/23/2019 08:30 AM    Cholesterol, total 180 04/11/2012 04:18 AM    HDL Cholesterol 52 01/23/2019 08:30 AM    HDL Cholesterol 36 04/11/2012 04:18 AM    LDL, calculated 125 (H) 01/23/2019 08:30 AM    LDL, calculated 127 (H) 04/11/2012 04:18 AM    Triglyceride 70 01/23/2019 08:30 AM    Triglyceride 85 04/11/2012 04:18 AM    CHOL/HDL Ratio 3.7 01/23/2019 08:30 AM    CHOL/HDL Ratio 5.0 04/11/2012 04:18 AM     ASSESSMENT  Mr. Nohemy Espinoza is stable and well compensated at this point. We are going to go ahead and double his Lisinopril. He was given written instructions in that regard. We cannot push his Coreg any further because of his bradycardia and we will send him for a fasting lipid profile. We are going to see him back in about two months and repeat a limited echocardiogram.         current treatment plan is effective, no change in therapy  lab results and schedule of future lab studies reviewed with patient  reviewed diet, exercise and weight control    Encounter Diagnoses   Name Primary?     Ischemic cardiomyopathy Yes    Atherosclerosis of native coronary artery of native heart without angina pectoris     Mixed hyperlipidemia      Orders Placed This Encounter    LIPID PANEL    METABOLIC PANEL, COMPREHENSIVE    DISCONTD: lisinopril (PRINIVIL, ZESTRIL) 20 mg tablet    lisinopril (PRINPATRIA, ZESTRIL) 20 mg tablet       Follow-up and Dispositions    · Return in about 2 months (around 9/26/2019).          Win Heaton MD  7/26/2019

## 2019-07-27 LAB
ALBUMIN SERPL-MCNC: 3.8 G/DL (ref 3.6–4.8)
ALBUMIN/GLOB SERPL: 1.6 {RATIO} (ref 1.2–2.2)
ALP SERPL-CCNC: 69 IU/L (ref 39–117)
ALT SERPL-CCNC: 27 IU/L (ref 0–44)
AST SERPL-CCNC: 35 IU/L (ref 0–40)
BILIRUB SERPL-MCNC: 0.6 MG/DL (ref 0–1.2)
BUN SERPL-MCNC: 14 MG/DL (ref 8–27)
BUN/CREAT SERPL: 18 (ref 10–24)
CALCIUM SERPL-MCNC: 9.2 MG/DL (ref 8.6–10.2)
CHLORIDE SERPL-SCNC: 105 MMOL/L (ref 96–106)
CHOLEST SERPL-MCNC: 117 MG/DL (ref 100–199)
CO2 SERPL-SCNC: 23 MMOL/L (ref 20–29)
CREAT SERPL-MCNC: 0.8 MG/DL (ref 0.76–1.27)
GLOBULIN SER CALC-MCNC: 2.4 G/DL (ref 1.5–4.5)
GLUCOSE SERPL-MCNC: 93 MG/DL (ref 65–99)
HDLC SERPL-MCNC: 43 MG/DL
INTERPRETATION, 910389: NORMAL
LDLC SERPL CALC-MCNC: 61 MG/DL (ref 0–99)
POTASSIUM SERPL-SCNC: 4.8 MMOL/L (ref 3.5–5.2)
PROT SERPL-MCNC: 6.2 G/DL (ref 6–8.5)
SODIUM SERPL-SCNC: 143 MMOL/L (ref 134–144)
TRIGL SERPL-MCNC: 66 MG/DL (ref 0–149)
VLDLC SERPL CALC-MCNC: 13 MG/DL (ref 5–40)

## 2019-07-29 DIAGNOSIS — E78.2 MIXED HYPERLIPIDEMIA: Primary | ICD-10-CM

## 2019-08-15 ENCOUNTER — ANTI-COAG VISIT (OUTPATIENT)
Dept: CARDIOLOGY CLINIC | Age: 64
End: 2019-08-15

## 2019-08-15 DIAGNOSIS — I25.10 ATHEROSCLEROSIS OF NATIVE CORONARY ARTERY OF NATIVE HEART WITHOUT ANGINA PECTORIS: ICD-10-CM

## 2019-08-15 DIAGNOSIS — I63.9 IMPENDING CEREBROVASCULAR ACCIDENT (HCC): ICD-10-CM

## 2019-08-15 DIAGNOSIS — I10 HYPERTENSION, UNSPECIFIED TYPE: ICD-10-CM

## 2019-08-15 DIAGNOSIS — Z79.01 LONG TERM (CURRENT) USE OF ANTICOAGULANTS: ICD-10-CM

## 2019-08-15 DIAGNOSIS — E78.2 MIXED HYPERLIPIDEMIA: ICD-10-CM

## 2019-08-15 LAB
INR BLD: 1.5
PT POC: NORMAL
VALID INTERNAL CONTROL?: YES

## 2019-08-15 RX ORDER — GUAIFENESIN 100 MG/5ML
81 LIQUID (ML) ORAL DAILY
Qty: 90 TAB | Refills: 1 | Status: SHIPPED | OUTPATIENT
Start: 2019-08-15

## 2019-08-15 RX ORDER — ATORVASTATIN CALCIUM 40 MG/1
40 TABLET, FILM COATED ORAL
Qty: 90 TAB | Refills: 1 | Status: SHIPPED | OUTPATIENT
Start: 2019-08-15 | End: 2019-12-26

## 2019-08-15 NOTE — PROGRESS NOTES
A full discussion of the nature of anticoagulants has been carried out. A benefit risk analysis has been presented to the patient, so that they understand the justification for choosing anticoagulation at this time. The need for frequent and regular monitoring, precise dosage adjustment and compliance is stressed. Side effects of potential bleeding are discussed. The patient should avoid any OTC items containing aspirin or ibuprofen, and should avoid great swings in general diet. Avoid alcohol consumption. Call if any signs of abnormal bleeding. Next PT/INR test in 1 week. Patient reports 1 or 2 missed doses last week while being away. Instructed to take 10 mg today then resume prior dosing. Previously therapeutic.   Patient verbalized understanding      Anticoagulation Summary  As of 8/15/2019    INR goal:   2.0-3.0   TTR:   55.3 % (5.9 mo)   INR used for dosin.5! (8/15/2019)   Warfarin maintenance plan:   10 mg (5 mg x 2) every Mon, Wed, Fri; 5 mg (5 mg x 1) all other days   Weekly warfarin total:   50 mg   Plan last modified:   Christopher Marshall RN (7/15/2019)   Next INR check:   2019   Target end date:       Indications    Impending cerebrovascular accident [facility]) [I63.9]  Long term (current) use of anticoagulants [Z79.01]             Anticoagulation Episode Summary     INR check location:       Preferred lab:       Send INR reminders to:       Comments:         Anticoagulation Care Providers     Provider Role Specialty Phone number    Serita Aschoff, MD [facility] Cardiology [phone]          Future Appointments   Date Time Provider Jack Lopez   2019  8:00 AM COUMADIN,  [facility]   2019  8:00 AM ECHO,  [place]   2019  9:00 AM Serita Aschoff,  [address]

## 2019-08-15 NOTE — TELEPHONE ENCOUNTER
Requested Prescriptions     Signed Prescriptions Disp Refills    aspirin 81 mg chewable tablet 90 Tab 1     Sig: Take 1 Tab by mouth daily. Authorizing Provider: Triny Martin     Ordering User: Oscar Rodriguez atorvastatin (LIPITOR) 40 mg tablet 90 Tab 1     Sig: Take 1 Tab by mouth nightly.      Authorizing Provider: Triny Martin     Ordering User: Simona Martinez verbal orders

## 2019-09-13 ENCOUNTER — ANTI-COAG VISIT (OUTPATIENT)
Dept: CARDIOLOGY CLINIC | Age: 64
End: 2019-09-13

## 2019-09-13 DIAGNOSIS — I63.9 IMPENDING CEREBROVASCULAR ACCIDENT (HCC): ICD-10-CM

## 2019-09-13 DIAGNOSIS — Z79.01 LONG TERM (CURRENT) USE OF ANTICOAGULANTS: ICD-10-CM

## 2019-09-13 LAB
INR BLD: 2.2
PT POC: NORMAL
VALID INTERNAL CONTROL?: YES

## 2019-09-13 NOTE — PROGRESS NOTES
A full discussion of the nature of anticoagulants has been carried out. A benefit risk analysis has been presented to the patient, so that they understand the justification for choosing anticoagulation at this time. The need for frequent and regular monitoring, precise dosage adjustment and compliance is stressed. Side effects of potential bleeding are discussed. The patient should avoid any OTC items containing aspirin or ibuprofen, and should avoid great swings in general diet. Avoid alcohol consumption. Call if any signs of abnormal bleeding. Next PT/INR test in 1 month. Continue current dosing regimen.    Patient verbalized understanding      Anticoagulation Summary  As of 2019    INR goal:   2.0-3.0   TTR:   51.6 % (6.9 mo)   INR used for dosin.2 (2019)   Warfarin maintenance plan:   10 mg (5 mg x 2) every Mon, Wed, Fri; 5 mg (5 mg x 1) all other days   Weekly warfarin total:   50 mg   Plan last modified:   Chelsie Strauss RN (7/15/2019)   Next INR check:   10/18/2019   Target end date:       Indications    Impending cerebrovascular accident [facility]) [I63.9]  Long term (current) use of anticoagulants [Z79.01]             Anticoagulation Episode Summary     INR check location:       Preferred lab:       Send INR reminders to:       Comments:         Anticoagulation Care Providers     Provider Role Specialty Phone number    Kashif Womack MD [facility] Cardiology [phone]          Future Appointments   Date Time Provider Jack Lopez   2019  8:00 AM ECHO,  [facility]   2019  9:00 AM Kashif Womack  [address]   10/18/2019  9:20 AM COUMADIN,  [place]

## 2019-09-23 DIAGNOSIS — E66.01 OBESITY, MORBID (HCC): ICD-10-CM

## 2019-09-23 DIAGNOSIS — E78.2 MIXED HYPERLIPIDEMIA: ICD-10-CM

## 2019-09-23 DIAGNOSIS — I25.10 ATHEROSCLEROSIS OF NATIVE CORONARY ARTERY OF NATIVE HEART WITHOUT ANGINA PECTORIS: ICD-10-CM

## 2019-09-23 DIAGNOSIS — I25.5 ISCHEMIC CARDIOMYOPATHY: ICD-10-CM

## 2019-09-23 RX ORDER — SPIRONOLACTONE 25 MG/1
25 TABLET ORAL DAILY
Qty: 90 TAB | Refills: 1 | Status: SHIPPED | OUTPATIENT
Start: 2019-09-23 | End: 2020-04-30

## 2019-09-27 ENCOUNTER — OFFICE VISIT (OUTPATIENT)
Dept: CARDIOLOGY CLINIC | Age: 64
End: 2019-09-27

## 2019-09-27 VITALS
OXYGEN SATURATION: 97 % | SYSTOLIC BLOOD PRESSURE: 112 MMHG | HEIGHT: 74 IN | BODY MASS INDEX: 33.5 KG/M2 | DIASTOLIC BLOOD PRESSURE: 70 MMHG | WEIGHT: 261 LBS | HEART RATE: 60 BPM | RESPIRATION RATE: 18 BRPM

## 2019-09-27 DIAGNOSIS — I25.5 ISCHEMIC CARDIOMYOPATHY: ICD-10-CM

## 2019-09-27 DIAGNOSIS — E78.2 MIXED HYPERLIPIDEMIA: ICD-10-CM

## 2019-09-27 DIAGNOSIS — I25.10 ATHEROSCLEROSIS OF NATIVE CORONARY ARTERY OF NATIVE HEART WITHOUT ANGINA PECTORIS: ICD-10-CM

## 2019-09-27 DIAGNOSIS — I25.5 ISCHEMIC CARDIOMYOPATHY: Primary | ICD-10-CM

## 2019-09-27 DIAGNOSIS — E66.01 OBESITY, MORBID (HCC): ICD-10-CM

## 2019-09-27 RX ORDER — CARVEDILOL 6.25 MG/1
6.25 TABLET ORAL 2 TIMES DAILY WITH MEALS
Qty: 180 TAB | Refills: 1 | Status: CANCELLED | OUTPATIENT
Start: 2019-09-27

## 2019-09-27 RX ORDER — WARFARIN SODIUM 5 MG/1
TABLET ORAL
Qty: 135 TAB | Refills: 0 | Status: SHIPPED | OUTPATIENT
Start: 2019-09-27 | End: 2019-12-31

## 2019-09-27 RX ORDER — CARVEDILOL 6.25 MG/1
6.25 TABLET ORAL 2 TIMES DAILY WITH MEALS
Qty: 180 TAB | Refills: 3 | Status: SHIPPED | OUTPATIENT
Start: 2019-09-27 | End: 2021-06-14

## 2019-09-27 RX ORDER — WARFARIN SODIUM 5 MG/1
TABLET ORAL
Qty: 135 TAB | Refills: 0 | Status: CANCELLED | OUTPATIENT
Start: 2019-09-27

## 2019-09-27 NOTE — PROGRESS NOTES
HISTORY OF PRESENT ILLNESS  Suzan Soriano is a 71 y.o. male     SUMMARY:   Problem List  Date Reviewed: 9/27/2019          Codes Class Noted    Impending cerebrovascular accident [facility]) ICD-10-CM: I63.9  ICD-9-CM: 435.9  2/8/2019        Long term (current) use of anticoagulants ICD-10-CM: Z79.01  ICD-9-CM: V58.61  2/8/2019        Obesity, morbid ([address] ICD-10-CM: E66.01  ICD-9-CM: 278.01  2/4/2019        CVA (cerebral vascular accident) [facility]) ICD-10-CM: I63.9  ICD-9-CM: 434.91  1/23/2019        Hyperlipidemia ICD-10-CM: E78.5  ICD-9-CM: 272.4  4/24/2012        Coronary atherosclerosis of native coronary artery ICD-10-CM: I25.10  ICD-9-CM: 414.01  4/24/2012        Acute anterior wall MI [facility]) ICD-10-CM: I21.09  ICD-9-CM: 410.10  4/10/2012    Overview Addendum 4/24/2012  3:40 PM by Wilfred Gilman MD     4/12 100% mid lad treated with single suly. 60% om1, 80% prox rca. Current Outpatient Medications on File Prior to Visit   Medication Sig    spironolactone (ALDACTONE) 25 mg tablet Take 1 Tab by mouth daily.  aspirin 81 mg chewable tablet Take 1 Tab by mouth daily.  atorvastatin (LIPITOR) 40 mg tablet Take 1 Tab by mouth nightly.  lisinopril (PRINIVIL, ZESTRIL) 20 mg tablet Take 1 Tab by mouth daily.  warfarin (COUMADIN) 5 mg tablet Take 2 tabs daily except M/W/F take 1 tab  or as directed by coumadin clinic (Patient taking differently: Take 2 tabs on  M/W/F take 1 tab  All other days or as directed by coumadin clinic)    carvedilol (COREG) 6.25 mg tablet Take 1 Tab by mouth two (2) times daily (with meals). No current facility-administered medications on file prior to visit. CARDIOLOGY STUDIES TO DATE:  4/12 echo lvef 50% with mild anterior hypo  1/19 echo dilated lv with lvef 25-30%, lae, mild aortic stenosis  1/19 cardiac cath: LAD widely patent stent, no significant disease. LCX co-dominant, first marginal has multiple high grade lesions including at ostium. Ramus small with tight lesion, 2.0 vessel. RCA small with multiple lesions proximal and mid. LV dilated with apical akinesis and severe global hypokinesis, EF 20%, LVEDP 40-45.  05/03/19   ECHO ADULT COMPLETE 05/06/2019 5/6/2019  Narrative  · Left Ventricle: Moderate-to-severe segmental systolic dysfunction. Calculated left ventricular ejection fraction is 35%. Biplane method used to measure ejection fraction. · The following segments are akinetic: mid anterior, mid anteroseptal, mid inferoseptal, apical anterior, apical septal, apical inferior, apical lateral and apex. The following segments are hypokinetic: basal inferior and mid inferior. All other segments are normal.· Left Atrium: Mildly dilated left atrium. · Right Ventricle: Mildly dilated right ventricle. · Normal wall thickness and global systolic function. · Right Atrium: Mildly dilated right atrium. · Aortic Valve: Aortic valve sclerosis. · Mitral Valve: Mild mitral annular calcification. Mild mitral valve regurgitation. Signed by: Rg Peña MD    Chief Complaint   Patient presents with    Coronary Artery Disease     HPI :  Mr. Basim Smith is doing well. His weight is holding steady. He is very active at work and is set on even losing more weight over the next couple of months. No problem with his medications. His echocardiogram today showed that his ejection fraction is still low at 34%. CARDIAC ROS:   negative for chest pain, dyspnea, palpitations, syncope, orthopnea, paroxysmal nocturnal dyspnea, exertional chest pressure/discomfort, claudication, lower extremity edema    No family history on file. Past Medical History:   Diagnosis Date    CAD (coronary artery disease)     stent, MI 4-10-12       GENERAL ROS:  A comprehensive review of systems was negative except for that written in the HPI.     Visit Vitals  /70 (BP 1 Location: Left arm, BP Patient Position: Sitting)   Pulse 60   Resp 18   Ht 6' 2\" (1.88 m)   Wt 261 lb (118.4 kg)   SpO2 97%   BMI 33.51 kg/m²       Wt Readings from Last 3 Encounters:   09/27/19 261 lb (118.4 kg)   09/27/19 261 lb (118.4 kg)   07/26/19 256 lb 6.4 oz (116.3 kg)            BP Readings from Last 3 Encounters:   09/27/19 112/70   09/27/19 130/80   07/26/19 130/80       PHYSICAL EXAM  General appearance: alert, cooperative, no distress, appears stated age  Neurologic: Alert and oriented X 3  Neck: supple, symmetrical, trachea midline, no adenopathy, no carotid bruit and no JVD  Lungs: clear to auscultation bilaterally  Heart: regular rate and rhythm, S1, S2 normal, no murmur, click, rub or gallop  Extremities: extremities normal, atraumatic, no cyanosis or edema    Lab Results   Component Value Date/Time    Cholesterol, total 117 07/26/2019 10:24 AM    Cholesterol, total 191 01/23/2019 08:30 AM    Cholesterol, total 180 04/11/2012 04:18 AM    HDL Cholesterol 43 07/26/2019 10:24 AM    HDL Cholesterol 52 01/23/2019 08:30 AM    HDL Cholesterol 36 04/11/2012 04:18 AM    LDL, calculated 61 07/26/2019 10:24 AM    LDL, calculated 125 (H) 01/23/2019 08:30 AM    LDL, calculated 127 (H) 04/11/2012 04:18 AM    Triglyceride 66 07/26/2019 10:24 AM    Triglyceride 70 01/23/2019 08:30 AM    Triglyceride 85 04/11/2012 04:18 AM    CHOL/HDL Ratio 3.7 01/23/2019 08:30 AM    CHOL/HDL Ratio 5.0 04/11/2012 04:18 AM     ASSESSMENT  Mr. Maggi Maher is stable and asymptomatic, well compensated on a good medical regimen and needs no cardiac testing at this time. He is still not interested in a defibrillator and understands the risks associated with that. current treatment plan is effective, no change in therapy  lab results and schedule of future lab studies reviewed with patient  reviewed diet, exercise and weight control    Encounter Diagnoses   Name Primary?     Ischemic cardiomyopathy Yes    Obesity, morbid ([address]     Mixed hyperlipidemia     Atherosclerosis of native coronary artery of native heart without angina pectoris      No orders of the defined types were placed in this encounter. Follow-up and Dispositions    · Return in about 3 months (around 12/27/2019).          Rosalba Ray MD  9/27/2019

## 2019-10-30 ENCOUNTER — ANTI-COAG VISIT (OUTPATIENT)
Dept: CARDIOLOGY CLINIC | Age: 64
End: 2019-10-30

## 2019-10-30 DIAGNOSIS — Z79.01 LONG TERM (CURRENT) USE OF ANTICOAGULANTS: ICD-10-CM

## 2019-10-30 DIAGNOSIS — I63.9 IMPENDING CEREBROVASCULAR ACCIDENT (HCC): ICD-10-CM

## 2019-10-30 LAB
INR BLD: 1.1
PT POC: NORMAL
VALID INTERNAL CONTROL?: YES

## 2019-10-30 NOTE — PROGRESS NOTES
A full discussion of the nature of anticoagulants has been carried out. A benefit risk analysis has been presented to the patient, so that they understand the justification for choosing anticoagulation at this time. The need for frequent and regular monitoring, precise dosage adjustment and compliance is stressed. Side effects of potential bleeding are discussed. The patient should avoid any OTC items containing aspirin or ibuprofen, and should avoid great swings in general diet. Avoid alcohol consumption. Call if any signs of abnormal bleeding. Next PT/INR test in 1 week. Patient reports he has been taking his Coumadin 2x a day (in Am and PM) on //. Levels were previously therapeutic. Re-educated patient on the need to take the 2 tabs on // together and to take at the same time each day. Written and verbal instructions given to patient. Will continue on same dosing regimen and see where levels are at next week and adjust as needed.   Patient verbalized understanding      Anticoagulation Summary  As of 10/30/2019    INR goal:   2.0-3.0   TTR:   45.4 % (8.5 mo)   INR used for dosin.1! (10/30/2019)   Warfarin maintenance plan:   10 mg (5 mg x 2) every Mon, Wed, Fri; 5 mg (5 mg x 1) all other days   Weekly warfarin total:   50 mg   Plan last modified:   Judy Hermosillo RN (7/15/2019)   Next INR check:   2019   Target end date:       Indications    Impending cerebrovascular accident [facility]) [I63.9]  Long term (current) use of anticoagulants [Z79.01]             Anticoagulation Episode Summary     INR check location:       Preferred lab:       Send INR reminders to:       Comments:         Anticoagulation Care Providers     Provider Role Specialty Phone number    Ann Tavera MD [facility] Cardiology [phone]          Future Appointments   Date Time Provider Jack Lopez   2019  8:00 AM COUMADINFARZANA SCHED   2019  8:20 AM Bladimir Vaughan III MD Meag[facility] Forman

## 2019-11-06 ENCOUNTER — ANTI-COAG VISIT (OUTPATIENT)
Dept: CARDIOLOGY CLINIC | Age: 64
End: 2019-11-06

## 2019-11-06 DIAGNOSIS — I63.9 IMPENDING CEREBROVASCULAR ACCIDENT (HCC): ICD-10-CM

## 2019-11-06 DIAGNOSIS — Z79.01 LONG TERM (CURRENT) USE OF ANTICOAGULANTS: ICD-10-CM

## 2019-11-06 LAB
INR BLD: 2.2
PT POC: NORMAL
VALID INTERNAL CONTROL?: YES

## 2019-11-06 NOTE — PROGRESS NOTES
A full discussion of the nature of anticoagulants has been carried out. A benefit risk analysis has been presented to the patient, so that they understand the justification for choosing anticoagulation at this time. The need for frequent and regular monitoring, precise dosage adjustment and compliance is stressed. Side effects of potential bleeding are discussed. The patient should avoid any OTC items containing aspirin or ibuprofen, and should avoid great swings in general diet. Avoid alcohol consumption. Call if any signs of abnormal bleeding. Next PT/INR test in 2 weeks per policy. Patient requesting next appt with Dr. Annika Barlow appt on 19. Patient out of town quite often for work. Continue current dosing regimen.    Patient verbalized understanding      Anticoagulation Summary  As of 2019    INR goal:   2.0-3.0   TTR:   44.7 % (8.7 mo)   INR used for dosin.2 (2019)   Warfarin maintenance plan:   10 mg (5 mg x 2) every Mon, Wed, Fri; 5 mg (5 mg x 1) all other days   Weekly warfarin total:   50 mg   Plan last modified:   Eligio Mayer RN (7/15/2019)   Next INR check:   2019   Target end date:       Indications    Impending cerebrovascular accident [facility]) [I63.9]  Long term (current) use of anticoagulants [Z79.01]             Anticoagulation Episode Summary     INR check location:       Preferred lab:       Send INR reminders to:       Comments:         Anticoagulation Care Providers     Provider Role Specialty Phone number    Kirk Castellanos MD [facility] Cardiology [phone]          Future Appointments   Date Time Provider Jack Mtzisti   2019  8:20 AM Kirk Castellanos  [address]    2019  9:20 AM COUMADIN, [id number] [facility]

## 2019-12-26 DIAGNOSIS — I25.10 ATHEROSCLEROSIS OF NATIVE CORONARY ARTERY OF NATIVE HEART WITHOUT ANGINA PECTORIS: ICD-10-CM

## 2019-12-26 DIAGNOSIS — E78.2 MIXED HYPERLIPIDEMIA: ICD-10-CM

## 2019-12-26 DIAGNOSIS — I10 HYPERTENSION, UNSPECIFIED TYPE: ICD-10-CM

## 2019-12-26 RX ORDER — ATORVASTATIN CALCIUM 40 MG/1
TABLET, FILM COATED ORAL
Qty: 90 TAB | Refills: 0 | Status: SHIPPED | OUTPATIENT
Start: 2019-12-26 | End: 2020-07-06

## 2019-12-27 ENCOUNTER — ANTI-COAG VISIT (OUTPATIENT)
Dept: CARDIOLOGY CLINIC | Age: 64
End: 2019-12-27

## 2019-12-27 ENCOUNTER — OFFICE VISIT (OUTPATIENT)
Dept: CARDIOLOGY CLINIC | Age: 64
End: 2019-12-27

## 2019-12-27 VITALS
WEIGHT: 288.8 LBS | OXYGEN SATURATION: 98 % | RESPIRATION RATE: 16 BRPM | BODY MASS INDEX: 37.06 KG/M2 | HEART RATE: 68 BPM | HEIGHT: 74 IN | SYSTOLIC BLOOD PRESSURE: 130 MMHG | DIASTOLIC BLOOD PRESSURE: 80 MMHG

## 2019-12-27 DIAGNOSIS — I63.9 IMPENDING CEREBROVASCULAR ACCIDENT (HCC): ICD-10-CM

## 2019-12-27 DIAGNOSIS — Z79.01 LONG TERM (CURRENT) USE OF ANTICOAGULANTS: ICD-10-CM

## 2019-12-27 DIAGNOSIS — E78.2 MIXED HYPERLIPIDEMIA: ICD-10-CM

## 2019-12-27 DIAGNOSIS — I10 HYPERTENSION, UNSPECIFIED TYPE: ICD-10-CM

## 2019-12-27 DIAGNOSIS — I25.5 ISCHEMIC CARDIOMYOPATHY: ICD-10-CM

## 2019-12-27 DIAGNOSIS — E66.01 OBESITY, MORBID (HCC): ICD-10-CM

## 2019-12-27 DIAGNOSIS — I25.10 ATHEROSCLEROSIS OF NATIVE CORONARY ARTERY OF NATIVE HEART WITHOUT ANGINA PECTORIS: Primary | ICD-10-CM

## 2019-12-27 LAB
INR BLD: 1.1
PT POC: NORMAL
VALID INTERNAL CONTROL?: YES

## 2019-12-27 NOTE — PROGRESS NOTES
HISTORY OF PRESENT ILLNESS  Aurelia Emanuel is a 71 y.o. male     SUMMARY:   Problem List  Date Reviewed: 12/27/2019          Codes Class Noted    Impending cerebrovascular accident [facility]) ICD-10-CM: I63.9  ICD-9-CM: 435.9  2/8/2019        Long term (current) use of anticoagulants ICD-10-CM: Z79.01  ICD-9-CM: V58.61  2/8/2019        Obesity, morbid ([address] ICD-10-CM: E66.01  ICD-9-CM: 278.01  2/4/2019        CVA (cerebral vascular accident) [facility]) ICD-10-CM: I63.9  ICD-9-CM: 434.91  1/23/2019        Hyperlipidemia ICD-10-CM: E78.5  ICD-9-CM: 272.4  4/24/2012        Coronary atherosclerosis of native coronary artery ICD-10-CM: I25.10  ICD-9-CM: 414.01  4/24/2012        Acute anterior wall MI [facility]) ICD-10-CM: I21.09  ICD-9-CM: 410.10  4/10/2012    Overview Addendum 4/24/2012  3:40 PM by Navi Lockhart MD     4/12 100% mid lad treated with single suly. 60% om1, 80% prox rca. Current Outpatient Medications on File Prior to Visit   Medication Sig    atorvastatin (LIPITOR) 40 mg tablet TAKE 1 TABLET BY MOUTH AT BEDTIME    warfarin (COUMADIN) 5 mg tablet Take 2 tabs daily except M/W/F take 1 tab  or as directed by coumadin clinic (Patient taking differently: Take 2 tabs on  M/W/F take 1 tab all other days or as directed by coumadin clinic)    carvedilol (COREG) 6.25 mg tablet Take 1 Tab by mouth two (2) times daily (with meals).  spironolactone (ALDACTONE) 25 mg tablet Take 1 Tab by mouth daily.  aspirin 81 mg chewable tablet Take 1 Tab by mouth daily.  lisinopril (PRINIVIL, ZESTRIL) 20 mg tablet Take 1 Tab by mouth daily. No current facility-administered medications on file prior to visit. CARDIOLOGY STUDIES TO DATE:  4/12 echo lvef 50% with mild anterior hypo  1/19 echo dilated lv with lvef 25-30%, lae, mild aortic stenosis  1/19 cardiac cath: LAD widely patent stent, no significant disease.  LCX co-dominant, first marginal has multiple high grade lesions including at ostium. Ramus small with tight lesion, 2.0 vessel. RCA small with multiple lesions proximal and mid. LV dilated with apical akinesis and severe global hypokinesis, EF 20%, LVEDP 40-45.  05/03/19  ECHO ADULT COMPLETE 05/06/2019 5/6/2019  Narrative  · Left Ventricle: Moderate-to-severe segmental systolic dysfunction. Calculated left ventricular ejection fraction is 35%. Biplane method used to measure ejection fraction. · The following segments are akinetic: mid anterior, mid anteroseptal, mid inferoseptal, apical anterior, apical septal, apical inferior, apical lateral and apex. The following segments are hypokinetic: basal inferior and mid inferior. All other segments are normal.· Left Atrium: Mildly dilated left atrium. · Right Ventricle: Mildly dilated right ventricle. · Normal wall thickness and global systolic function. · Right Atrium: Mildly dilated right atrium. · Aortic Valve: Aortic valve sclerosis. · Mitral Valve: Mild mitral annular calcification. Mild mitral valve regurgitation. Signed by: David Carmen MD    9/19 echo lvef 34%    Chief Complaint   Patient presents with    Cardiomyopathy     HPI :  Mr. Yusuf Pedroza is doing fine and remains asymptomatic. He still does a lot of heavy work as part of his job without any issues, but for the last few weeks they have been slow and he has been eating a lot around the holidays and has put back on about 20 pounds since we last met. CARDIAC ROS:   negative for chest pain, dyspnea, palpitations, syncope, orthopnea, paroxysmal nocturnal dyspnea, exertional chest pressure/discomfort, claudication, lower extremity edema    No family history on file. Past Medical History:   Diagnosis Date    CAD (coronary artery disease)     stent, MI 4-10-12       GENERAL ROS:  A comprehensive review of systems was negative except for that written in the HPI.     Visit Vitals  /80 (BP 1 Location: Left arm, BP Patient Position: Sitting)   Pulse 68   Resp 16   Ht 6' 2\" (1.88 m)   Wt 288 lb 12.8 oz (131 kg)   SpO2 98%   BMI 37.08 kg/m²       Wt Readings from Last 3 Encounters:   12/27/19 288 lb 12.8 oz (131 kg)   09/27/19 261 lb (118.4 kg)   09/27/19 261 lb (118.4 kg)            BP Readings from Last 3 Encounters:   12/27/19 130/80   09/27/19 112/70   09/27/19 130/80       PHYSICAL EXAM  General appearance: alert, cooperative, no distress, appears stated age  Neurologic: Alert and oriented X 3  Neck: supple, symmetrical, trachea midline, no adenopathy, no carotid bruit and no JVD  Lungs: clear to auscultation bilaterally  Heart: regular rate and rhythm, S1, S2 normal, no murmur, click, rub or gallop  Extremities: extremities normal, atraumatic, no cyanosis or edema    Lab Results   Component Value Date/Time    Cholesterol, total 117 07/26/2019 10:24 AM    Cholesterol, total 191 01/23/2019 08:30 AM    Cholesterol, total 180 04/11/2012 04:18 AM    HDL Cholesterol 43 07/26/2019 10:24 AM    HDL Cholesterol 52 01/23/2019 08:30 AM    HDL Cholesterol 36 04/11/2012 04:18 AM    LDL, calculated 61 07/26/2019 10:24 AM    LDL, calculated 125 (H) 01/23/2019 08:30 AM    LDL, calculated 127 (H) 04/11/2012 04:18 AM    Triglyceride 66 07/26/2019 10:24 AM    Triglyceride 70 01/23/2019 08:30 AM    Triglyceride 85 04/11/2012 04:18 AM    CHOL/HDL Ratio 3.7 01/23/2019 08:30 AM    CHOL/HDL Ratio 5.0 04/11/2012 04:18 AM     ASSESSMENT  Mr. Ottoniel Kuhn is stable and well compensated on a good medical regimen. When we meet again, we are going to do a limited echocardiogram to see where we  terms of his ejection fraction. current treatment plan is effective, no change in therapy  lab results and schedule of future lab studies reviewed with patient  reviewed diet, exercise and weight control    Encounter Diagnoses   Name Primary?     Atherosclerosis of native coronary artery of native heart without angina pectoris Yes    Ischemic cardiomyopathy     Obesity, morbid ([address]     Mixed hyperlipidemia  Hypertension, unspecified type      No orders of the defined types were placed in this encounter. Follow-up and Dispositions    · Return in about 3 months (around 3/27/2020).          Davis Hubbard MD  12/27/2019

## 2019-12-27 NOTE — PROGRESS NOTES
A full discussion of the nature of anticoagulants has been carried out. A benefit risk analysis has been presented to the patient, so that they understand the justification for choosing anticoagulation at this time. The need for frequent and regular monitoring, precise dosage adjustment and compliance is stressed. Side effects of potential bleeding are discussed. The patient should avoid any OTC items containing aspirin or ibuprofen, and should avoid great swings in general diet. Avoid alcohol consumption. Call if any signs of abnormal bleeding. Next PT/INR test in 1 week. Patient is not compliant with coming when instructed for Coumadin checks. Reports he is taking his meds but levels extremely low. Reports eating a lot of vit K foods and different diet and gained 20 lbs over the holidays. Previously therapeutic.   Instructed to take 2 tabs on Sat/Sun then resume prior dosing and will adjust next Friday based on levels    Patient verbalized understanding      Anticoagulation Summary  As of 2019    INR goal:   2.0-3.0   TTR:   40.4 % (10.4 mo)   INR used for dosin.1! (2019)   Warfarin maintenance plan:   10 mg (5 mg x 2) every Mon, Wed, Fri; 5 mg (5 mg x 1) all other days   Weekly warfarin total:   50 mg   Plan last modified:   Cristiano Azul RN (7/15/2019)   Next INR check:   1/3/2020   Target end date:       Indications    Impending cerebrovascular accident [facility]) [I63.9]  Long term (current) use of anticoagulants [Z79.01]             Anticoagulation Episode Summary     INR check location:       Preferred lab:       Send INR reminders to:       Comments:         Anticoagulation Care Providers     Provider Role Specialty Phone number    Vee Bradshaw MD [facility] Cardiology [phone]          Future Appointments   Date Time Provider Jack Lopez   2019 11:00 AM COUMADIN,  [address]   1/3/2020  1:00 PM COUMADIN,  [address] 3/23/2020  8:00 AM ECHO, YANES CAVREY BETSY [facility]   3/23/2020  8:40 AM Ledy Bright  [address]

## 2019-12-28 DIAGNOSIS — E78.2 MIXED HYPERLIPIDEMIA: ICD-10-CM

## 2019-12-28 DIAGNOSIS — I25.5 ISCHEMIC CARDIOMYOPATHY: ICD-10-CM

## 2019-12-28 DIAGNOSIS — E66.01 OBESITY, MORBID (HCC): ICD-10-CM

## 2019-12-28 DIAGNOSIS — I25.10 ATHEROSCLEROSIS OF NATIVE CORONARY ARTERY OF NATIVE HEART WITHOUT ANGINA PECTORIS: ICD-10-CM

## 2019-12-31 RX ORDER — WARFARIN SODIUM 5 MG/1
TABLET ORAL
Qty: 135 TAB | Refills: 0 | Status: SHIPPED | OUTPATIENT
Start: 2019-12-31 | End: 2020-03-02

## 2019-12-31 NOTE — TELEPHONE ENCOUNTER
Requested Prescriptions     Signed Prescriptions Disp Refills    warfarin (JANTOVEN) 5 mg tablet 135 Tab 0     Sig: Take 1 tablet (5mg) daily except on Mondays/Wednesdays/Friday, take 2 tablets (10mg)     Authorizing Provider: Jose De Jesus Mendieta     Ordering User: Eileen Reyes     Verbal order per KATHLEEN Spivey NP.     Future Appointments   Date Time Provider Jack Lopez   1/3/2020  1:00 PM COUMADIN, [address]   3/23/2020  8:00 AM ECHO, [address]   3/23/2020  8:40 AM Vee Bradshaw  [address]

## 2020-01-03 ENCOUNTER — ANTI-COAG VISIT (OUTPATIENT)
Dept: CARDIOLOGY CLINIC | Age: 65
End: 2020-01-03

## 2020-01-03 DIAGNOSIS — I63.9 IMPENDING CEREBROVASCULAR ACCIDENT (HCC): ICD-10-CM

## 2020-01-03 DIAGNOSIS — Z79.01 LONG TERM (CURRENT) USE OF ANTICOAGULANTS: ICD-10-CM

## 2020-01-03 LAB
INR BLD: 1.2
PT POC: NORMAL
VALID INTERNAL CONTROL?: YES

## 2020-01-03 NOTE — PROGRESS NOTES
A full discussion of the nature of anticoagulants has been carried out. A benefit risk analysis has been presented to the patient, so that they understand the justification for choosing anticoagulation at this time. The need for frequent and regular monitoring, precise dosage adjustment and compliance is stressed. Side effects of potential bleeding are discussed. The patient should avoid any OTC items containing aspirin or ibuprofen, and should avoid great swings in general diet. Avoid alcohol consumption. Call if any signs of abnormal bleeding. Next PT/INR test in 1 week. Levels remain very low. Patient recently gained 20 lbs. Diet is inconsistent. Instructed patient to increase dose to 2 tabs daily except Wed take 1 tab. Written and verbal instructions given to patient.    Patient verbalized understanding      Anticoagulation Summary  As of 1/3/2020    INR goal:   2.0-3.0   TTR:   39.5 % (10.6 mo)   INR used for dosing:      Warfarin maintenance plan:   5 mg (5 mg x 1) every Wed; 10 mg (5 mg x 2) all other days   Weekly warfarin total:   65 mg   Plan last modified:   Felipe Archuleta RN (1/3/2020)   Next INR check:   1/9/2020   Target end date:       Indications    Impending cerebrovascular accident [facility]) [I63.9]  Long term (current) use of anticoagulants [Z79.01]             Anticoagulation Episode Summary     INR check location:       Preferred lab:       Send INR reminders to:       Comments:         Anticoagulation Care Providers     Provider Role Specialty Phone number    Mark Luna MD Responsible Cardiology [phone]          Future Appointments   Date Time Provider Jack Lopez   1/9/2020  7:40 AM COUMADIN, [address]   3/23/2020  8:00 AM ECHO, [address]   3/23/2020  8:40 AM Mark Luna  [address]

## 2020-01-09 ENCOUNTER — ANTI-COAG VISIT (OUTPATIENT)
Dept: CARDIOLOGY CLINIC | Age: 65
End: 2020-01-09

## 2020-01-09 DIAGNOSIS — Z79.01 LONG TERM (CURRENT) USE OF ANTICOAGULANTS: ICD-10-CM

## 2020-01-09 DIAGNOSIS — I63.9 IMPENDING CEREBROVASCULAR ACCIDENT (HCC): ICD-10-CM

## 2020-01-09 LAB
INR BLD: 2.4
PT POC: NORMAL
VALID INTERNAL CONTROL?: YES

## 2020-01-09 NOTE — PROGRESS NOTES
A full discussion of the nature of anticoagulants has been carried out. A benefit risk analysis has been presented to the patient, so that they understand the justification for choosing anticoagulation at this time. The need for frequent and regular monitoring, precise dosage adjustment and compliance is stressed. Side effects of potential bleeding are discussed. The patient should avoid any OTC items containing aspirin or ibuprofen, and should avoid great swings in general diet. Avoid alcohol consumption. Call if any signs of abnormal bleeding. Next PT/INR test in 2 weeks. Continue current dosing regimen.    Patient verbalized understanding      Anticoagulation Summary  As of 2020    INR goal:   2.0-3.0   TTR:   39.3 % (10.8 mo)   INR used for dosin.4 (2020)   Warfarin maintenance plan:   5 mg (5 mg x 1) every Wed; 10 mg (5 mg x 2) all other days   Weekly warfarin total:   65 mg   Plan last modified:   Leonora Lyle RN (1/3/2020)   Next INR check:   2020   Target end date:       Indications    Impending cerebrovascular accident [facility]) [I63.9]  Long term (current) use of anticoagulants [Z79.01]             Anticoagulation Episode Summary     INR check location:       Preferred lab:       Send INR reminders to:       Comments:         Anticoagulation Care Providers     Provider Role Specialty Phone number    Saw Pathak MD Responsible Cardiology [phone]          Future Appointments   Date Time Provider Jack Lopez   2020  8:00 AM COUMADIN,  [address]   3/23/2020  8:00 AM ECHO,  [address]   3/23/2020  8:40 AM Saw Pathak  [address]

## 2020-01-27 ENCOUNTER — ANTI-COAG VISIT (OUTPATIENT)
Dept: CARDIOLOGY CLINIC | Age: 65
End: 2020-01-27

## 2020-01-27 DIAGNOSIS — Z79.01 LONG TERM (CURRENT) USE OF ANTICOAGULANTS: ICD-10-CM

## 2020-01-27 DIAGNOSIS — I63.9 IMPENDING CEREBROVASCULAR ACCIDENT (HCC): ICD-10-CM

## 2020-01-27 LAB
INR BLD: 1.8
PT POC: NORMAL
VALID INTERNAL CONTROL?: YES

## 2020-01-27 NOTE — PROGRESS NOTES
A full discussion of the nature of anticoagulants has been carried out. A benefit risk analysis has been presented to the patient, so that they understand the justification for choosing anticoagulation at this time. The need for frequent and regular monitoring, precise dosage adjustment and compliance is stressed. Side effects of potential bleeding are discussed. The patient should avoid any OTC items containing aspirin or ibuprofen, and should avoid great swings in general diet. Avoid alcohol consumption. Call if any signs of abnormal bleeding. Next PT/INR test in 2 weeks. Patient reports he has been taking the old dose of M/W/F 2 tabs and 1 tab all other days and not as instructed. Will continue on this dose and see where levels are at in 2 weeks.       Anticoagulation Summary  As of 2020    INR goal:   2.0-3.0   TTR:   40.8 % (11.4 mo)   INR used for dosin.8! (2020)   Warfarin maintenance plan:   10 mg (5 mg x 2) every Mon, Wed, Fri; 5 mg (5 mg x 1) all other days   Weekly warfarin total:   50 mg   Plan last modified:   Frank Lema RN (2020)   Next INR check:   2020   Target end date:       Indications    Impending cerebrovascular accident [facility]) [I63.9]  Long term (current) use of anticoagulants [Z79.01]             Anticoagulation Episode Summary     INR check location:       Preferred lab:       Send INR reminders to:       Comments:         Anticoagulation Care Providers     Provider Role Specialty Phone number    Hallie Fernandez MD Responsible Cardiology [phone]          Future Appointments   Date Time Provider Jack Lopez   2020  3:20 PM COUMADIN,  [address]   3/23/2020  8:00 AM ECHO,  [address]   3/23/2020  8:40 AM Hallie Fernandez  [address]

## 2020-01-29 DIAGNOSIS — E78.2 MIXED HYPERLIPIDEMIA: ICD-10-CM

## 2020-02-17 ENCOUNTER — ANTI-COAG VISIT (OUTPATIENT)
Dept: CARDIOLOGY CLINIC | Age: 65
End: 2020-02-17

## 2020-02-17 DIAGNOSIS — I63.9 IMPENDING CEREBROVASCULAR ACCIDENT (HCC): ICD-10-CM

## 2020-02-17 DIAGNOSIS — Z79.01 LONG TERM (CURRENT) USE OF ANTICOAGULANTS: ICD-10-CM

## 2020-02-17 LAB
INR BLD: 1.4
PT POC: NORMAL
VALID INTERNAL CONTROL?: YES

## 2020-02-17 NOTE — PROGRESS NOTES
A full discussion of the nature of anticoagulants has been carried out. A benefit risk analysis has been presented to the patient, so that they understand the justification for choosing anticoagulation at this time. The need for frequent and regular monitoring, precise dosage adjustment and compliance is stressed. Side effects of potential bleeding are discussed. The patient should avoid any OTC items containing aspirin or ibuprofen, and should avoid great swings in general diet. Avoid alcohol consumption. Call if any signs of abnormal bleeding. Next PT/INR test in 1 week. Levels remain low. Patient does not come for appts as should per protocol due to being out of town for work. Instructed to increase dose to 10 mg daily except Wed take 5 mg. Written and verbal instructions given to patient.    Patient verbalized understanding      Anticoagulation Summary  As of 2020    INR goal:   2.0-3.0   TTR:   38.4 % (1 y)   INR used for dosin.4! (2020)   Warfarin maintenance plan:   5 mg (5 mg x 1) every Wed; 10 mg (5 mg x 2) all other days   Weekly warfarin total:   65 mg   Plan last modified:   Robin Bell RN (2020)   Next INR check:   2020   Target end date:       Indications    Impending cerebrovascular accident [facility]) [I63.9]  Long term (current) use of anticoagulants [Z79.01]             Anticoagulation Episode Summary     INR check location:       Preferred lab:       Send INR reminders to:       Comments:         Anticoagulation Care Providers     Provider Role Specialty Phone number    Manny Magana MD Responsible Cardiology [phone]          Future Appointments   Date Time Provider Jack Lopez   2020  7:40 AM COUMADIN,  [address]   3/23/2020  8:00 AM ECHO,  [address]   3/23/2020  8:40 AM Manny Magana  [address]

## 2020-02-24 ENCOUNTER — ANTI-COAG VISIT (OUTPATIENT)
Dept: CARDIOLOGY CLINIC | Age: 65
End: 2020-02-24

## 2020-02-24 DIAGNOSIS — I63.9 IMPENDING CEREBROVASCULAR ACCIDENT (HCC): ICD-10-CM

## 2020-02-24 DIAGNOSIS — Z79.01 LONG TERM (CURRENT) USE OF ANTICOAGULANTS: ICD-10-CM

## 2020-02-24 LAB
INR BLD: 2.7
PT POC: NORMAL
VALID INTERNAL CONTROL?: YES

## 2020-02-24 NOTE — PROGRESS NOTES
A full discussion of the nature of anticoagulants has been carried out. A benefit risk analysis has been presented to the patient, so that they understand the justification for choosing anticoagulation at this time. The need for frequent and regular monitoring, precise dosage adjustment and compliance is stressed. Side effects of potential bleeding are discussed. The patient should avoid any OTC items containing aspirin or ibuprofen, and should avoid great swings in general diet. Avoid alcohol consumption. Call if any signs of abnormal bleeding. Next PT/INR test in 2 weeks per policy. Patient requesting next appt 3/23/2020 with Dr. Veronika Hooks appt. Levels much better. Continue current dosing regimen.    Patient verbalized understanding      Anticoagulation Summary  As of 2020    INR goal:   2.0-3.0   TTR:   38.7 % (1 y)   INR used for dosin.7 (2020)   Warfarin maintenance plan:   5 mg (5 mg x 1) every Wed; 10 mg (5 mg x 2) all other days   Weekly warfarin total:   65 mg   Plan last modified:   Ruby Ruvalcaba RN (2020)   Next INR check:   3/23/2020   Target end date:       Indications    Impending cerebrovascular accident [facility]) [I63.9]  Long term (current) use of anticoagulants [Z79.01]             Anticoagulation Episode Summary     INR check location:       Preferred lab:       Send INR reminders to:       Comments:         Anticoagulation Care Providers     Provider Role Specialty Phone number    Kandi Jimenes MD Responsible Cardiology [phone]          Future Appointments   Date Time Provider Jack Lopez   3/23/2020  8:00 AM COUMADIN,  [address]   3/23/2020  8:00 AM ECHO,  [address]   3/23/2020  8:40 AM Kandi Jimenes  [address]

## 2020-02-28 DIAGNOSIS — E78.2 MIXED HYPERLIPIDEMIA: ICD-10-CM

## 2020-02-28 DIAGNOSIS — E66.01 OBESITY, MORBID (HCC): ICD-10-CM

## 2020-02-28 DIAGNOSIS — I25.10 ATHEROSCLEROSIS OF NATIVE CORONARY ARTERY OF NATIVE HEART WITHOUT ANGINA PECTORIS: ICD-10-CM

## 2020-02-28 DIAGNOSIS — I25.5 ISCHEMIC CARDIOMYOPATHY: ICD-10-CM

## 2020-03-02 RX ORDER — WARFARIN SODIUM 5 MG/1
TABLET ORAL
Qty: 156 TAB | Refills: 0 | Status: SHIPPED | OUTPATIENT
Start: 2020-03-02 | End: 2020-06-19 | Stop reason: SDUPTHER

## 2020-03-02 NOTE — TELEPHONE ENCOUNTER
Requested Prescriptions     Signed Prescriptions Disp Refills    warfarin (COUMADIN) 5 mg tablet 156 Tab 0     Sig: Take 1 tablet (5mg) on Wed and  take 2 tabs all other days     Authorizing Provider: Kennieth Spatz     Ordering User: Yin Margarette       Last office visit 12/27/19    Per Dr. Eliana Anglin   Date Time Provider Jack Lopez   3/23/2020  8:00 AM COUMADIN, [address]   3/23/2020  8:00 AM ECHO, [address]   3/23/2020  8:40 AM Jose Riley  [address]

## 2020-03-30 ENCOUNTER — ANTI-COAG VISIT (OUTPATIENT)
Dept: CARDIOLOGY CLINIC | Age: 65
End: 2020-03-30

## 2020-03-30 DIAGNOSIS — Z79.01 LONG TERM (CURRENT) USE OF ANTICOAGULANTS: ICD-10-CM

## 2020-03-30 DIAGNOSIS — I63.9 IMPENDING CEREBROVASCULAR ACCIDENT (HCC): ICD-10-CM

## 2020-03-30 LAB
INR BLD: 2.2
PT POC: NORMAL
VALID INTERNAL CONTROL?: YES

## 2020-03-30 NOTE — PROGRESS NOTES
A full discussion of the nature of anticoagulants has been carried out. A benefit risk analysis has been presented to the patient, so that they understand the justification for choosing anticoagulation at this time. The need for frequent and regular monitoring, precise dosage adjustment and compliance is stressed. Side effects of potential bleeding are discussed. The patient should avoid any OTC items containing aspirin or ibuprofen, and should avoid great swings in general diet. Avoid alcohol consumption. Call if any signs of abnormal bleeding. Next PT/INR test in 1 month with Dr. Suresh harrist    Continue current dosing regimen.    Patient verbalized understanding      Anticoagulation Summary  As of 3/30/2020    INR goal:   2.0-3.0   TTR:   44.0 % (1.1 y)   INR used for dosin.2 (3/30/2020)   Warfarin maintenance plan:   5 mg (5 mg x 1) every Wed; 10 mg (5 mg x 2) all other days   Weekly warfarin total:   65 mg   Plan last modified:   Randy Fairbanks RN (2020)   Next INR check:   2020   Target end date:       Indications    Impending cerebrovascular accident [facility]) [I63.9]  Long term (current) use of anticoagulants [Z79.01]             Anticoagulation Episode Summary     INR check location:       Preferred lab:       Send INR reminders to:       Comments:         Anticoagulation Care Providers     Provider Role Specialty Phone number    Francesca Carter MD [facility] Cardiology [phone]          Future Appointments   Date Time Provider Jack Lopez   2020  3:00 PM COUMADIN,  [address]   2020  3:00 PM ECHO,  [address]   2020  4:00 PM Francesca Carter  [address]

## 2020-04-30 DIAGNOSIS — I25.10 ATHEROSCLEROSIS OF NATIVE CORONARY ARTERY OF NATIVE HEART WITHOUT ANGINA PECTORIS: ICD-10-CM

## 2020-04-30 DIAGNOSIS — E66.01 OBESITY, MORBID (HCC): ICD-10-CM

## 2020-04-30 DIAGNOSIS — E78.2 MIXED HYPERLIPIDEMIA: ICD-10-CM

## 2020-04-30 DIAGNOSIS — I25.5 ISCHEMIC CARDIOMYOPATHY: ICD-10-CM

## 2020-04-30 RX ORDER — SPIRONOLACTONE 25 MG/1
TABLET ORAL
Qty: 90 TAB | Refills: 0 | Status: SHIPPED | OUTPATIENT
Start: 2020-04-30 | End: 2020-05-08

## 2020-04-30 NOTE — TELEPHONE ENCOUNTER
Requested Prescriptions     Signed Prescriptions Disp Refills    spironolactone (ALDACTONE) 25 mg tablet 90 Tab 0     Sig: TAKE ONE TABLET BY MOUTH ONE TIME DAILY     Authorizing Provider: Porsche Ndiaye     Ordering User: Tang Samaniego    Per Dr. Kirk Sousa verbal orders

## 2020-05-08 DIAGNOSIS — I25.5 ISCHEMIC CARDIOMYOPATHY: ICD-10-CM

## 2020-05-08 DIAGNOSIS — E66.01 OBESITY, MORBID (HCC): ICD-10-CM

## 2020-05-08 DIAGNOSIS — E78.2 MIXED HYPERLIPIDEMIA: ICD-10-CM

## 2020-05-08 DIAGNOSIS — I25.10 ATHEROSCLEROSIS OF NATIVE CORONARY ARTERY OF NATIVE HEART WITHOUT ANGINA PECTORIS: ICD-10-CM

## 2020-05-08 RX ORDER — SPIRONOLACTONE 25 MG/1
TABLET ORAL
Qty: 90 TAB | Refills: 0 | Status: SHIPPED | OUTPATIENT
Start: 2020-05-08 | End: 2021-06-14

## 2020-05-08 NOTE — TELEPHONE ENCOUNTER
Requested Prescriptions     Signed Prescriptions Disp Refills    spironolactone (ALDACTONE) 25 mg tablet 90 Tab 0     Sig: TAKE 1 TABLET BY MOUTH ONE TIME DAILY     Authorizing Provider: Glenn Vo     Ordering User: Maggie Sharp    Per Dr. Justin Montano verbal orders

## 2020-06-15 DIAGNOSIS — E66.01 OBESITY, MORBID (HCC): ICD-10-CM

## 2020-06-15 DIAGNOSIS — I25.5 ISCHEMIC CARDIOMYOPATHY: ICD-10-CM

## 2020-06-15 DIAGNOSIS — I25.10 ATHEROSCLEROSIS OF NATIVE CORONARY ARTERY OF NATIVE HEART WITHOUT ANGINA PECTORIS: ICD-10-CM

## 2020-06-15 DIAGNOSIS — E78.2 MIXED HYPERLIPIDEMIA: ICD-10-CM

## 2020-06-16 ENCOUNTER — TELEPHONE (OUTPATIENT)
Dept: CARDIOLOGY CLINIC | Age: 65
End: 2020-06-16

## 2020-06-16 RX ORDER — WARFARIN SODIUM 5 MG/1
TABLET ORAL
Qty: 156 TAB | Refills: 0 | OUTPATIENT
Start: 2020-06-16

## 2020-06-16 NOTE — TELEPHONE ENCOUNTER
Received call from patient regarding Coumadin refill. Explained to patient he has not been in for an INR check since 3/30/2020. Appt made for Friday 6/19/2020. Patient reports he has enough medication till Friday.

## 2020-06-19 ENCOUNTER — ANTI-COAG VISIT (OUTPATIENT)
Dept: CARDIOLOGY CLINIC | Age: 65
End: 2020-06-19

## 2020-06-19 DIAGNOSIS — I25.5 ISCHEMIC CARDIOMYOPATHY: ICD-10-CM

## 2020-06-19 DIAGNOSIS — E78.2 MIXED HYPERLIPIDEMIA: ICD-10-CM

## 2020-06-19 DIAGNOSIS — Z79.01 LONG TERM (CURRENT) USE OF ANTICOAGULANTS: ICD-10-CM

## 2020-06-19 DIAGNOSIS — I25.10 ATHEROSCLEROSIS OF NATIVE CORONARY ARTERY OF NATIVE HEART WITHOUT ANGINA PECTORIS: ICD-10-CM

## 2020-06-19 DIAGNOSIS — I63.9 IMPENDING CEREBROVASCULAR ACCIDENT (HCC): ICD-10-CM

## 2020-06-19 DIAGNOSIS — E66.01 OBESITY, MORBID (HCC): ICD-10-CM

## 2020-06-19 LAB
INR BLD: 1.3
PT POC: NORMAL
VALID INTERNAL CONTROL?: YES

## 2020-06-19 RX ORDER — WARFARIN SODIUM 5 MG/1
TABLET ORAL
Qty: 156 TAB | Refills: 0 | Status: SHIPPED | OUTPATIENT
Start: 2020-06-19 | End: 2020-09-15

## 2020-06-19 NOTE — PROGRESS NOTES
A full discussion of the nature of anticoagulants has been carried out. A benefit risk analysis has been presented to the patient, so that they understand the justification for choosing anticoagulation at this time. The need for frequent and regular monitoring, precise dosage adjustment and compliance is stressed. Side effects of potential bleeding are discussed. The patient should avoid any OTC items containing aspirin or ibuprofen, and should avoid great swings in general diet. Avoid alcohol consumption. Call if any signs of abnormal bleeding. Next PT/INR test in 1 week. Patient requesting next appt with Dr. Juan Luis Sosa appt on 2020. Patient reports only taking 5 mg yesterday instead of the 10 mg as per dosing regimen. Previously therapeutic. Hasnt had INR check since 3/30/2020. Will resume current regimen. Patient out of medication,    Requested Prescriptions     Signed Prescriptions Disp Refills    warfarin (COUMADIN) 5 mg tablet 156 Tab 0     Sig: Take 1 tablet (5mg) on Wed and  take 2 tabs all other days       Last office visit 19    Per Dr. Candice eWeks   Date Time Provider Jack Mtzisti   2020  3:00 PM COUMADIN,  [address]   2020  3:00 PM ECHO,  [address]   2020  4:00 PM Juliann Fuentes  [address]             Anticoagulation Summary  As of 2020    INR goal:   2.0-3.0   TTR:   40.4 % (1.3 y)   INR used for dosin. 3! (2020)   Warfarin maintenance plan:   5 mg (5 mg x 1) every Wed; 10 mg (5 mg x 2) all other days   Weekly warfarin total:   65 mg   Plan last modified:   Dario Durbin RN (2020)   Next INR check:   2020   Target end date:       Indications    Impending cerebrovascular accident [facility]) [I63.9]  Long term (current) use of anticoagulants [Z79.01]             Anticoagulation Episode Summary     INR check location:       Preferred lab:       Send INR reminders to:       Comments:         Anticoagulation Care Providers     Provider Role Specialty Phone number    Andrea Dunaway MD Responsible Cardiology [phone]          Future Appointments   Date Time Provider Jack Jessica   7/2/2020  3:00 PM COUMADIN, [address]   7/2/2020  3:00 PM ECHO, [address]   7/2/2020  4:00 PM Andrea Dunaway  [address]

## 2020-07-03 DIAGNOSIS — I10 HYPERTENSION, UNSPECIFIED TYPE: ICD-10-CM

## 2020-07-03 DIAGNOSIS — I25.10 ATHEROSCLEROSIS OF NATIVE CORONARY ARTERY OF NATIVE HEART WITHOUT ANGINA PECTORIS: ICD-10-CM

## 2020-07-03 DIAGNOSIS — E78.2 MIXED HYPERLIPIDEMIA: ICD-10-CM

## 2020-07-06 RX ORDER — ATORVASTATIN CALCIUM 40 MG/1
TABLET, FILM COATED ORAL
Qty: 90 TAB | Refills: 0 | Status: SHIPPED | OUTPATIENT
Start: 2020-07-06 | End: 2020-10-23 | Stop reason: SDUPTHER

## 2020-07-06 NOTE — TELEPHONE ENCOUNTER
Requested Prescriptions     Signed Prescriptions Disp Refills    atorvastatin (LIPITOR) 40 mg tablet 90 Tab 0     Sig: TAKE 1 TABLET BY MOUTH AT BEDTIME     Authorizing Provider: Fred Kaba     Ordering User: Ivory Saez    Per Dr. Trav Bull verbal orders

## 2020-07-15 ENCOUNTER — TELEPHONE (OUTPATIENT)
Dept: CARDIOLOGY CLINIC | Age: 65
End: 2020-07-15

## 2020-07-15 ENCOUNTER — ANTI-COAG VISIT (OUTPATIENT)
Dept: CARDIOLOGY CLINIC | Age: 65
End: 2020-07-15

## 2020-07-15 DIAGNOSIS — I63.9 IMPENDING CEREBROVASCULAR ACCIDENT (HCC): ICD-10-CM

## 2020-07-15 DIAGNOSIS — Z79.01 LONG TERM (CURRENT) USE OF ANTICOAGULANTS: ICD-10-CM

## 2020-07-15 LAB
INR BLD: 3.2
PT POC: NORMAL
VALID INTERNAL CONTROL?: YES

## 2020-07-15 NOTE — PROGRESS NOTES
A full discussion of the nature of anticoagulants has been carried out. A benefit risk analysis has been presented to the patient, so that they understand the justification for choosing anticoagulation at this time. The need for frequent and regular monitoring, precise dosage adjustment and compliance is stressed. Side effects of potential bleeding are discussed. The patient should avoid any OTC items containing aspirin or ibuprofen, and should avoid great swings in general diet. Avoid alcohol consumption. Call if any signs of abnormal bleeding. Next PT/INR test in 1 week per policy. Patient reports he is [place] of the [place] for the next 3 weeks. Continue current dosing regimen. Patient verbalized understanding  Scheduled to take lower dose tonight per dosing regimen. Anticoagulation Summary  As of 7/15/2020    INR goal:   2.0-3.0   TTR:   41.0 % (1.4 y)   INR used for dosing:   3.2!  (7/15/2020)   Warfarin maintenance plan:   5 mg (5 mg x 1) every Wed; 10 mg (5 mg x 2) all other days   Weekly warfarin total:   65 mg   Plan last modified:   Lizzy Rivas RN (2/17/2020)   Next INR check:   8/7/2020   Target end date:       Indications    Impending cerebrovascular accident [facility]) [I63.9]  Long term (current) use of anticoagulants [Z79.01]             Anticoagulation Episode Summary     INR check location:       Preferred lab:       Send INR reminders to:       Comments:         Anticoagulation Care Providers     Provider Role Specialty Phone number    Emily Staples MD Responsible Cardiology [phone]          Future Appointments   Date Time Provider Jack Lopez   7/15/2020  8:40 AM COUMADIN, [address]   8/7/2020  9:00 AM COUMADIN, [address]

## 2020-08-07 ENCOUNTER — ANTI-COAG VISIT (OUTPATIENT)
Dept: CARDIOLOGY CLINIC | Age: 65
End: 2020-08-07
Payer: SELF-PAY

## 2020-08-07 DIAGNOSIS — I63.9 IMPENDING CEREBROVASCULAR ACCIDENT (HCC): ICD-10-CM

## 2020-08-07 DIAGNOSIS — Z79.01 LONG TERM (CURRENT) USE OF ANTICOAGULANTS: ICD-10-CM

## 2020-08-07 LAB
INR BLD: 2.7
PT POC: NORMAL
VALID INTERNAL CONTROL?: YES

## 2020-08-07 PROCEDURE — 85610 PROTHROMBIN TIME: CPT | Performed by: SPECIALIST

## 2020-08-07 NOTE — PROGRESS NOTES
A full discussion of the nature of anticoagulants has been carried out. A benefit risk analysis has been presented to the patient, so that they understand the justification for choosing anticoagulation at this time. The need for frequent and regular monitoring, precise dosage adjustment and compliance is stressed. Side effects of potential bleeding are discussed. The patient should avoid any OTC items containing aspirin or ibuprofen, and should avoid great swings in general diet. Avoid alcohol consumption. Call if any signs of abnormal bleeding. Next PT/INR test in 1 month. Continue current dosing regimen.    Patient verbalized understanding      Anticoagulation Summary  As of 2020    INR goal:   2.0-3.0   TTR:   41.8 % (1.5 y)   INR used for dosin.7 (2020)   Warfarin maintenance plan:   5 mg (5 mg x 1) every Wed; 10 mg (5 mg x 2) all other days   Weekly warfarin total:   65 mg   Plan last modified:   Kezia Juan RN (2020)   Next INR check:   2020   Target end date:       Indications    Impending cerebrovascular accident [facility]) [I63.9]  Long term (current) use of anticoagulants [Z79.01]             Anticoagulation Episode Summary     INR check location:       Preferred lab:       Send INR reminders to:       Comments:         Anticoagulation Care Providers     Provider Role Specialty Phone number    Kris Pyle MD [facility] Cardiology [phone]          Future Appointments   Date Time Provider Jack Lopez   2020  2:40 PM MD YANNI Anton   2020  9:00 AM COUMADINFARZANA

## 2020-08-12 ENCOUNTER — OFFICE VISIT (OUTPATIENT)
Dept: CARDIOLOGY CLINIC | Age: 65
End: 2020-08-12

## 2020-08-12 VITALS
BODY MASS INDEX: 39.78 KG/M2 | RESPIRATION RATE: 16 BRPM | HEART RATE: 60 BPM | SYSTOLIC BLOOD PRESSURE: 130 MMHG | OXYGEN SATURATION: 96 % | DIASTOLIC BLOOD PRESSURE: 84 MMHG | WEIGHT: 310 LBS | HEIGHT: 74 IN

## 2020-08-12 DIAGNOSIS — E78.2 MIXED HYPERLIPIDEMIA: ICD-10-CM

## 2020-08-12 DIAGNOSIS — Z79.01 LONG TERM (CURRENT) USE OF ANTICOAGULANTS: ICD-10-CM

## 2020-08-12 DIAGNOSIS — I25.10 ATHEROSCLEROSIS OF NATIVE CORONARY ARTERY OF NATIVE HEART WITHOUT ANGINA PECTORIS: Primary | ICD-10-CM

## 2020-08-12 DIAGNOSIS — I25.5 ISCHEMIC CARDIOMYOPATHY: ICD-10-CM

## 2020-08-12 DIAGNOSIS — E66.01 OBESITY, MORBID (HCC): ICD-10-CM

## 2020-08-12 PROCEDURE — 99213 OFFICE O/P EST LOW 20 MIN: CPT | Performed by: SPECIALIST

## 2020-08-12 NOTE — PROGRESS NOTES
HISTORY OF PRESENT ILLNESS  Carolina Barlow is a 76 y.o. male     SUMMARY:   Problem List  Date Reviewed: 8/12/2020          Codes Class Noted    Impending cerebrovascular accident [facility]) ICD-10-CM: I63.9  ICD-9-CM: 435.9  2/8/2019        Long term (current) use of anticoagulants ICD-10-CM: Z79.01  ICD-9-CM: V58.61  2/8/2019        Obesity, morbid ([address] ICD-10-CM: E66.01  ICD-9-CM: 278.01  2/4/2019        CVA (cerebral vascular accident) [facility]) ICD-10-CM: I63.9  ICD-9-CM: 434.91  1/23/2019        Hyperlipidemia ICD-10-CM: E78.5  ICD-9-CM: 272.4  4/24/2012        Coronary atherosclerosis of native coronary artery ICD-10-CM: I25.10  ICD-9-CM: 414.01  4/24/2012        Acute anterior wall MI [facility]) ICD-10-CM: I21.09  ICD-9-CM: 410.10  4/10/2012    Overview Addendum 4/24/2012  3:40 PM by Armand Call MD     4/12 100% mid lad treated with single suly. 60% om1, 80% prox rca. Current Outpatient Medications on File Prior to Visit   Medication Sig    atorvastatin (LIPITOR) 40 mg tablet TAKE 1 TABLET BY MOUTH AT BEDTIME    warfarin (COUMADIN) 5 mg tablet Take 1 tablet (5mg) on Wed and  take 2 tabs all other days    spironolactone (ALDACTONE) 25 mg tablet TAKE 1 TABLET BY MOUTH ONE TIME DAILY    carvedilol (COREG) 6.25 mg tablet Take 1 Tab by mouth two (2) times daily (with meals).  aspirin 81 mg chewable tablet Take 1 Tab by mouth daily.  lisinopril (PRINIVIL, ZESTRIL) 20 mg tablet Take 1 Tab by mouth daily. No current facility-administered medications on file prior to visit. CARDIOLOGY STUDIES TO DATE:  PHI: [facility]  4/12 echo lvef 50% with mild anterior hypo  1/19 echo dilated lv with lvef 25-30%, lae, mild aortic stenosis  1/19 cardiac cath: LAD widely patent stent, no significant disease. LCX co-dominant, first marginal has multiple high grade lesions including at ostium. Ramus small with tight lesion, 2.0 vessel.  RCA small with multiple lesions proximal and mid. LV dilated with apical akinesis and severe global hypokinesis, EF 20%, LVEDP 40-45.  05/03/19  ECHO ADULT COMPLETE 05/06/2019 5/6/2019  Narrative  · Left Ventricle: Moderate-to-severe segmental systolic dysfunction. Calculated left ventricular ejection fraction is 35%. Biplane method used to measure ejection fraction. · The following segments are akinetic: mid anterior, mid anteroseptal, mid inferoseptal, apical anterior, apical septal, apical inferior, apical lateral and apex. The following segments are hypokinetic: basal inferior and mid inferior. All other segments are normal.· Left Atrium: Mildly dilated left atrium. · Right Ventricle: Mildly dilated right ventricle. · Normal wall thickness and global systolic function. · Right Atrium: Mildly dilated right atrium. · Aortic Valve: Aortic valve sclerosis. · Mitral Valve: Mild mitral annular calcification. Mild mitral valve regurgitation. Signed by: Leah Styles MD    9/19 echo lvef 34%   7/20 echo dilated lv lvef 30-35%           Chief Complaint   Patient presents with    Follow-up     HPI :  He is doing well though but because of the virus he has not been going to the gym and is put on some weight. At work is really slacked off as well but he they are managing his blood pressures well controlled and he is keeping up with his Coumadin checks. CARDIAC ROS:   negative for chest pain, dyspnea, palpitations, syncope, orthopnea, paroxysmal nocturnal dyspnea, exertional chest pressure/discomfort, claudication, lower extremity edema    No family history on file. Past Medical History:   Diagnosis Date    CAD (coronary artery disease)     stent, MI 4-10-12       GENERAL ROS:  A comprehensive review of systems was negative except for that written in the HPI.     Visit Vitals  /84 (BP 1 Location: Left arm, BP Patient Position: Sitting)   Pulse 60   Resp 16   Ht 6' 2\" (1.88 m)   Wt 310 lb (140.6 kg)   SpO2 96%   BMI 39.80 kg/m²       Wt Readings from Last 3 Encounters:   08/12/20 310 lb (140.6 kg)   07/15/20 300 lb (136.1 kg)   12/27/19 288 lb 12.8 oz (131 kg)            BP Readings from Last 3 Encounters:   08/12/20 130/84   12/27/19 130/80   09/27/19 112/70       PHYSICAL EXAM  General appearance: alert, cooperative, no distress, appears stated age  Neurologic: Alert and oriented X 3  Neck: supple, symmetrical, trachea midline, no adenopathy, no carotid bruit and no JVD  Lungs: clear to auscultation bilaterally  Heart: regular rate and rhythm, S1, S2 normal, no murmur, click, rub or gallop  Extremities: extremities normal, atraumatic, no cyanosis or edema    Lab Results   Component Value Date/Time    Cholesterol, total 117 07/26/2019 10:24 AM    Cholesterol, total 191 01/23/2019 08:30 AM    Cholesterol, total 180 04/11/2012 04:18 AM    HDL Cholesterol 43 07/26/2019 10:24 AM    HDL Cholesterol 52 01/23/2019 08:30 AM    HDL Cholesterol 36 04/11/2012 04:18 AM    LDL, calculated 61 07/26/2019 10:24 AM    LDL, calculated 125 (H) 01/23/2019 08:30 AM    LDL, calculated 127 (H) 04/11/2012 04:18 AM    Triglyceride 66 07/26/2019 10:24 AM    Triglyceride 70 01/23/2019 08:30 AM    Triglyceride 85 04/11/2012 04:18 AM    CHOL/HDL Ratio 3.7 01/23/2019 08:30 AM    CHOL/HDL Ratio 5.0 04/11/2012 04:18 AM     ASSESSMENT :      He is stable and asymptomatic, well compensated on a good medical regimen and needs no cardiac testing at this time. We again talked about the rationale for defibrillator. Apparently his brother is a family physician in [place] and they talked as brother said that they would not put a defibrillator in for his ejection fraction which I do not understand. He is willing to meet with Dr. Ovidio Summers so we will set that up and they can discuss things further.     current treatment plan is effective, no change in therapy  lab results and schedule of future lab studies reviewed with patient  reviewed diet, exercise and weight control    Encounter Diagnoses   Name Primary?  Atherosclerosis of native coronary artery of native heart without angina pectoris Yes    Mixed hyperlipidemia     Obesity, morbid ([address]     Ischemic cardiomyopathy     Long term (current) use of anticoagulants      No orders of the defined types were placed in this encounter. Follow-up and Dispositions    · Return in about 6 months (around 2/12/2021). Guero Hall MD  8/12/2020  Please note that this dictation was completed with [employer], the computer voice recognition software. Quite often unanticipated grammatical, syntax, homophones, and other interpretive errors are inadvertently transcribed by the computer software. Please disregard these errors. Please excuse any errors that have escaped final proofreading. Thank you.

## 2020-08-14 DIAGNOSIS — I10 HYPERTENSION, UNSPECIFIED TYPE: Primary | ICD-10-CM

## 2020-08-14 RX ORDER — LISINOPRIL 20 MG/1
TABLET ORAL
Qty: 90 TAB | Refills: 3 | Status: SHIPPED
Start: 2020-08-14 | End: 2021-06-14

## 2020-08-14 NOTE — TELEPHONE ENCOUNTER
Requested Prescriptions     Signed Prescriptions Disp Refills    lisinopriL (PRINIVIL, ZESTRIL) 20 mg tablet 90 Tab 3     Sig: TAKE 1 TABLET BY MOUTH ONE TIME DAILY     Authorizing Provider: Fabienne Aguirre     Ordering User: Ranjit Carrillo    Per Dr. Ave Acharya verbal orders

## 2020-09-14 DIAGNOSIS — E66.01 OBESITY, MORBID (HCC): ICD-10-CM

## 2020-09-14 DIAGNOSIS — I25.10 ATHEROSCLEROSIS OF NATIVE CORONARY ARTERY OF NATIVE HEART WITHOUT ANGINA PECTORIS: ICD-10-CM

## 2020-09-14 DIAGNOSIS — I25.5 ISCHEMIC CARDIOMYOPATHY: ICD-10-CM

## 2020-09-14 DIAGNOSIS — E78.2 MIXED HYPERLIPIDEMIA: ICD-10-CM

## 2020-09-15 RX ORDER — WARFARIN SODIUM 5 MG/1
TABLET ORAL
Qty: 52 TAB | Refills: 0 | Status: SHIPPED | OUTPATIENT
Start: 2020-09-15 | End: 2020-10-23 | Stop reason: SDUPTHER

## 2020-09-15 NOTE — TELEPHONE ENCOUNTER
Requested Prescriptions     Signed Prescriptions Disp Refills    warfarin (COUMADIN) 5 mg tablet 52 Tab 0     Sig: TAKE 1 TABLET ON WEDNESDAY AND TAKE 2 TABS ALL OTHER DAYS-NEEDS INR CHECK     Authorizing Provider: Tl Cordero     Ordering User: Radha Fisher       Last office visit 8/12/2020    Per Dr. Wakefield Smoke   Date Time Provider Jack Lopez   12/14/2020 10:40 AM MD YANNI Engle AMB

## 2020-10-23 ENCOUNTER — ANTI-COAG VISIT (OUTPATIENT)
Dept: CARDIOLOGY CLINIC | Age: 65
End: 2020-10-23

## 2020-10-23 DIAGNOSIS — I63.9 IMPENDING CEREBROVASCULAR ACCIDENT (HCC): ICD-10-CM

## 2020-10-23 DIAGNOSIS — I10 HYPERTENSION, UNSPECIFIED TYPE: ICD-10-CM

## 2020-10-23 DIAGNOSIS — E78.2 MIXED HYPERLIPIDEMIA: ICD-10-CM

## 2020-10-23 DIAGNOSIS — Z79.01 LONG TERM (CURRENT) USE OF ANTICOAGULANTS: ICD-10-CM

## 2020-10-23 DIAGNOSIS — I25.5 ISCHEMIC CARDIOMYOPATHY: ICD-10-CM

## 2020-10-23 DIAGNOSIS — I25.10 ATHEROSCLEROSIS OF NATIVE CORONARY ARTERY OF NATIVE HEART WITHOUT ANGINA PECTORIS: ICD-10-CM

## 2020-10-23 DIAGNOSIS — E66.01 OBESITY, MORBID (HCC): ICD-10-CM

## 2020-10-23 LAB
INR BLD: 1
PT POC: NORMAL
VALID INTERNAL CONTROL?: YES

## 2020-10-23 PROCEDURE — 85610 PROTHROMBIN TIME: CPT | Performed by: SPECIALIST

## 2020-10-23 RX ORDER — ATORVASTATIN CALCIUM 40 MG/1
40 TABLET, FILM COATED ORAL
Qty: 90 TAB | Refills: 3 | Status: SHIPPED | OUTPATIENT
Start: 2020-10-23 | End: 2021-06-24 | Stop reason: ALTCHOICE

## 2020-10-23 RX ORDER — WARFARIN SODIUM 5 MG/1
TABLET ORAL
Qty: 52 TAB | Refills: 0 | Status: SHIPPED | OUTPATIENT
Start: 2020-10-23 | End: 2020-11-23

## 2020-10-23 NOTE — TELEPHONE ENCOUNTER
Pt walked in to make an appt with Bing and to ask for refills on medications  Pharmacy confirmed      [phone]

## 2020-10-23 NOTE — TELEPHONE ENCOUNTER
Request for Lipitor 40mg QHS. Last office visit 8-21-20, next office visit 12-14-20. Refills per verbal order from Dr. Cali Zaidi as Dr. Elieser Cantu is out of the office.

## 2020-10-23 NOTE — PROGRESS NOTES
A full discussion of the nature of anticoagulants has been carried out. A benefit risk analysis has been presented to the patient, so that they understand the justification for choosing anticoagulation at this time. The need for frequent and regular monitoring, precise dosage adjustment and compliance is stressed. Side effects of potential bleeding are discussed. The patient should avoid any OTC items containing aspirin or ibuprofen, and should avoid great swings in general diet. Avoid alcohol consumption. Call if any signs of abnormal bleeding. Next PT/INR test in 1 week. Patient has not had INR check since 2020. Patient also reports he has not taken his Coumadin in a week and needs a refill. Instructed to resume current regimen and then recheck levels in a week.   Patient verbalized understanding      Anticoagulation Summary  As of 10/23/2020    INR goal:   2.0-3.0   TTR:   41.7 % (1.7 y)   INR used for dosin.0! (10/23/2020)   Warfarin maintenance plan:   5 mg (5 mg x 1) every Wed; 10 mg (5 mg x 2) all other days   Weekly warfarin total:   65 mg   Plan last modified:   Jeremiah Bradley RN (2020)   Next INR check:   2020   Target end date:       Indications    Impending cerebrovascular accident [facility]) [I63.9]  Long term (current) use of anticoagulants [Z79.01]             Anticoagulation Episode Summary     INR check location:       Preferred lab:       Send INR reminders to:       Comments:         Anticoagulation Care Providers     Provider Role Specialty Phone number    Jeannett Babinski, MD [facility] [phone]          Future Appointments   Date Time Provider Jack Lopez   2020  8:40 AM FARZANA HODGES AMB   2020 10:40 AM Jeannett Babinski, MD CAVREY BS AMB

## 2020-10-23 NOTE — TELEPHONE ENCOUNTER
Requested Prescriptions     Signed Prescriptions Disp Refills    warfarin (COUMADIN) 5 mg tablet 52 Tab 0     Sig: TAKE 1 TABLET ON WEDNESDAY AND TAKE 2 TABS ALL OTHER DAYS     Authorizing Provider: Ketan Biswas     Ordering User: Brianne Mayorga    atorvastatin (LIPITOR) 40 mg tablet 90 Tab 3     Sig: Take 1 Tab by mouth nightly.      Authorizing Provider: Ketan Biswas     Ordering User: Kimberly Coats       Last office visit 8/12/2020    Per Dr. Garry Casas   Date Time Provider Jack Lopez   11/2/2020  8:40 AM FARZANA HODGES BS AMB   12/14/2020 10:40 AM MD YANNI Romo BS AMB

## 2020-11-02 ENCOUNTER — ANTI-COAG VISIT (OUTPATIENT)
Dept: CARDIOLOGY CLINIC | Age: 65
End: 2020-11-02

## 2020-11-02 DIAGNOSIS — I63.9 IMPENDING CEREBROVASCULAR ACCIDENT (HCC): ICD-10-CM

## 2020-11-02 DIAGNOSIS — Z79.01 LONG TERM (CURRENT) USE OF ANTICOAGULANTS: ICD-10-CM

## 2020-11-02 LAB
INR BLD: 2.4
PT POC: NORMAL
VALID INTERNAL CONTROL?: YES

## 2020-11-02 PROCEDURE — 85610 PROTHROMBIN TIME: CPT | Performed by: SPECIALIST

## 2020-11-02 NOTE — PROGRESS NOTES
A full discussion of the nature of anticoagulants has been carried out. A benefit risk analysis has been presented to the patient, so that they understand the justification for choosing anticoagulation at this time. The need for frequent and regular monitoring, precise dosage adjustment and compliance is stressed. Side effects of potential bleeding are discussed. The patient should avoid any OTC items containing aspirin or ibuprofen, and should avoid great swings in general diet. Avoid alcohol consumption. Call if any signs of abnormal bleeding. Next PT/INR test in 2 weeks. Patient requesting next appt with Dr. Bo Ascencio appt on 2020  Levels back within range. Continue current dosing regimen.    Patient verbalized understanding      Anticoagulation Summary  As of 2020    INR goal:   2.0-3.0   TTR:   41.5 % (1.7 y)   INR used for dosin.4 (2020)   Warfarin maintenance plan:   5 mg (5 mg x 1) every Wed; 10 mg (5 mg x 2) all other days   Weekly warfarin total:   65 mg   Plan last modified:   Wan Weaver RN (2020)   Next INR check:   2020   Target end date:       Indications    Impending cerebrovascular accident [facility]) [I63.9]  Long term (current) use of anticoagulants [Z79.01]             Anticoagulation Episode Summary     INR check location:       Preferred lab:       Send INR reminders to:       Comments:         Anticoagulation Care Providers     Provider Role Specialty Phone number    Camila Adorno MD [facility] Cardiology [phone]          Future Appointments   Date Time Provider Jack Lopez   2020  8:40 AM FARZANA HODGES AMB   2020 10:40 AM MD YANNI Ho III AMB   2020 10:40 AM FARZANA HODGES AMB

## 2020-11-20 DIAGNOSIS — E78.2 MIXED HYPERLIPIDEMIA: ICD-10-CM

## 2020-11-20 DIAGNOSIS — E66.01 OBESITY, MORBID (HCC): ICD-10-CM

## 2020-11-20 DIAGNOSIS — I25.10 ATHEROSCLEROSIS OF NATIVE CORONARY ARTERY OF NATIVE HEART WITHOUT ANGINA PECTORIS: ICD-10-CM

## 2020-11-20 DIAGNOSIS — I25.5 ISCHEMIC CARDIOMYOPATHY: ICD-10-CM

## 2020-11-23 RX ORDER — WARFARIN SODIUM 5 MG/1
TABLET ORAL
Qty: 52 TAB | Refills: 0 | Status: SHIPPED
Start: 2020-11-23 | End: 2021-06-14

## 2020-11-23 NOTE — TELEPHONE ENCOUNTER
Requested Prescriptions     Signed Prescriptions Disp Refills    warfarin (COUMADIN) 5 mg tablet 52 Tab 0     Sig: TAKE 1 TABLET ON WEDNESDAY AND TAKE 2 TABS ALL OTHER DAYS     Authorizing Provider: Himanshu Rodriguez     Ordering User: Ovidio Yeniandrews       Last office visit 8/12/2020    Per Dr. Harmony Ng   Date Time Provider Jack Lopez   12/14/2020 10:40 AM MD YANNI Osman   12/14/2020 10:40 AM FARZANA HODGES AMB

## 2021-01-15 ENCOUNTER — HOSPITAL ENCOUNTER (EMERGENCY)
Age: 66
Discharge: HOME | End: 2021-01-16
Attending: EMERGENCY MEDICINE

## 2021-01-15 DIAGNOSIS — R53.83 FATIGUE, UNSPECIFIED TYPE: Primary | ICD-10-CM

## 2021-01-15 DIAGNOSIS — N39.0 URINARY TRACT INFECTION WITHOUT HEMATURIA, SITE UNSPECIFIED: ICD-10-CM

## 2021-01-15 LAB
ALBUMIN SERPL-MCNC: 3.8 G/DL (ref 3.5–5)
ALBUMIN/GLOB SERPL: 0.9 {RATIO} (ref 1.1–2.2)
ALP SERPL-CCNC: 73 U/L (ref 45–117)
ALT SERPL-CCNC: 34 U/L (ref 12–78)
ANION GAP SERPL CALC-SCNC: 6 MMOL/L (ref 5–15)
AST SERPL-CCNC: 23 U/L (ref 15–37)
BASOPHILS # BLD: 0.1 K/UL (ref 0–0.1)
BASOPHILS NFR BLD: 0 % (ref 0–1)
BILIRUB SERPL-MCNC: 1.2 MG/DL (ref 0.2–1)
BUN SERPL-MCNC: 19 MG/DL (ref 6–20)
BUN/CREAT SERPL: 14 (ref 12–20)
CALCIUM SERPL-MCNC: 9.4 MG/DL (ref 8.5–10.1)
CHLORIDE SERPL-SCNC: 104 MMOL/L (ref 97–108)
CO2 SERPL-SCNC: 24 MMOL/L (ref 21–32)
COMMENT, HOLDF: NORMAL
CREAT SERPL-MCNC: 1.35 MG/DL (ref 0.7–1.3)
DIFFERENTIAL METHOD BLD: ABNORMAL
EOSINOPHIL # BLD: 0 K/UL (ref 0–0.4)
EOSINOPHIL NFR BLD: 0 % (ref 0–7)
ERYTHROCYTE [DISTWIDTH] IN BLOOD BY AUTOMATED COUNT: 12.9 % (ref 11.5–14.5)
GLOBULIN SER CALC-MCNC: 4.3 G/DL (ref 2–4)
GLUCOSE SERPL-MCNC: 148 MG/DL (ref 65–100)
HCT VFR BLD AUTO: 45.3 % (ref 36.6–50.3)
HGB BLD-MCNC: 15.1 G/DL (ref 12.1–17)
IMM GRANULOCYTES # BLD AUTO: 0.1 K/UL (ref 0–0.04)
IMM GRANULOCYTES NFR BLD AUTO: 1 % (ref 0–0.5)
INR PPP: 1 (ref 0.9–1.1)
LYMPHOCYTES # BLD: 1.1 K/UL (ref 0.8–3.5)
LYMPHOCYTES NFR BLD: 7 % (ref 12–49)
MCH RBC QN AUTO: 32.9 PG (ref 26–34)
MCHC RBC AUTO-ENTMCNC: 33.3 G/DL (ref 30–36.5)
MCV RBC AUTO: 98.7 FL (ref 80–99)
MONOCYTES # BLD: 1.1 K/UL (ref 0–1)
MONOCYTES NFR BLD: 7 % (ref 5–13)
NEUTS SEG # BLD: 13.4 K/UL (ref 1.8–8)
NEUTS SEG NFR BLD: 85 % (ref 32–75)
NRBC # BLD: 0 K/UL (ref 0–0.01)
NRBC BLD-RTO: 0 PER 100 WBC
PLATELET # BLD AUTO: 208 K/UL (ref 150–400)
PMV BLD AUTO: 10.7 FL (ref 8.9–12.9)
POTASSIUM SERPL-SCNC: 4 MMOL/L (ref 3.5–5.1)
PROT SERPL-MCNC: 8.1 G/DL (ref 6.4–8.2)
PROTHROMBIN TIME: 10.9 SEC (ref 9–11.1)
RBC # BLD AUTO: 4.59 M/UL (ref 4.1–5.7)
SAMPLES BEING HELD,HOLD: NORMAL
SODIUM SERPL-SCNC: 134 MMOL/L (ref 136–145)
TROPONIN I SERPL-MCNC: <0.05 NG/ML
UR CULT HOLD, URHOLD: NORMAL
WBC # BLD AUTO: 15.7 K/UL (ref 4.1–11.1)

## 2021-01-15 PROCEDURE — 85610 PROTHROMBIN TIME: CPT

## 2021-01-15 PROCEDURE — 96365 THER/PROPH/DIAG IV INF INIT: CPT

## 2021-01-15 PROCEDURE — 81001 URINALYSIS AUTO W/SCOPE: CPT

## 2021-01-15 PROCEDURE — 36415 COLL VENOUS BLD VENIPUNCTURE: CPT

## 2021-01-15 PROCEDURE — 85025 COMPLETE CBC W/AUTO DIFF WBC: CPT

## 2021-01-15 PROCEDURE — 84484 ASSAY OF TROPONIN QUANT: CPT

## 2021-01-15 PROCEDURE — 80053 COMPREHEN METABOLIC PANEL: CPT

## 2021-01-15 PROCEDURE — 99284 EMERGENCY DEPT VISIT MOD MDM: CPT

## 2021-01-15 PROCEDURE — 93005 ELECTROCARDIOGRAM TRACING: CPT

## 2021-01-16 VITALS
TEMPERATURE: 98.7 F | OXYGEN SATURATION: 96 % | DIASTOLIC BLOOD PRESSURE: 86 MMHG | HEART RATE: 99 BPM | RESPIRATION RATE: 16 BRPM | SYSTOLIC BLOOD PRESSURE: 132 MMHG

## 2021-01-16 LAB
APPEARANCE UR: ABNORMAL
BACTERIA URNS QL MICRO: ABNORMAL /HPF
BILIRUB UR QL CFM: NEGATIVE
COLOR UR: ABNORMAL
EPITH CASTS URNS QL MICRO: ABNORMAL /LPF
GLUCOSE UR STRIP.AUTO-MCNC: NEGATIVE MG/DL
HGB UR QL STRIP: ABNORMAL
KETONES UR QL STRIP.AUTO: ABNORMAL MG/DL
LEUKOCYTE ESTERASE UR QL STRIP.AUTO: ABNORMAL
NITRITE UR QL STRIP.AUTO: POSITIVE
PH UR STRIP: 5.5 [PH] (ref 5–8)
PROT UR STRIP-MCNC: 30 MG/DL
RBC #/AREA URNS HPF: ABNORMAL /HPF (ref 0–5)
SP GR UR REFRACTOMETRY: 1.02 (ref 1–1.03)
UROBILINOGEN UR QL STRIP.AUTO: 1 EU/DL (ref 0.2–1)
WBC URNS QL MICRO: ABNORMAL /HPF (ref 0–4)

## 2021-01-16 PROCEDURE — 87186 SC STD MICRODIL/AGAR DIL: CPT

## 2021-01-16 PROCEDURE — 87086 URINE CULTURE/COLONY COUNT: CPT

## 2021-01-16 PROCEDURE — 87077 CULTURE AEROBIC IDENTIFY: CPT

## 2021-01-16 PROCEDURE — 74011250636 HC RX REV CODE- 250/636: Performed by: EMERGENCY MEDICINE

## 2021-01-16 PROCEDURE — 74011000258 HC RX REV CODE- 258: Performed by: EMERGENCY MEDICINE

## 2021-01-16 RX ORDER — CEFDINIR 300 MG/1
300 CAPSULE ORAL 2 TIMES DAILY
Qty: 20 CAP | Refills: 0 | Status: SHIPPED | OUTPATIENT
Start: 2021-01-16 | End: 2021-01-26

## 2021-01-16 RX ORDER — SODIUM CHLORIDE 900 MG/100ML
INJECTION INTRAVENOUS
Status: DISCONTINUED
Start: 2021-01-16 | End: 2021-01-16 | Stop reason: HOSPADM

## 2021-01-16 RX ORDER — CEFTRIAXONE 1 G/1
INJECTION, POWDER, FOR SOLUTION INTRAMUSCULAR; INTRAVENOUS
Status: DISCONTINUED
Start: 2021-01-16 | End: 2021-01-16 | Stop reason: HOSPADM

## 2021-01-16 RX ORDER — SODIUM CHLORIDE 9 MG/ML
INJECTION, SOLUTION INTRAVENOUS
Status: DISCONTINUED
Start: 2021-01-16 | End: 2021-01-16 | Stop reason: HOSPADM

## 2021-01-16 RX ADMIN — CEFTRIAXONE SODIUM 1 G: 1 INJECTION, POWDER, FOR SOLUTION INTRAMUSCULAR; INTRAVENOUS at 00:29

## 2021-01-16 NOTE — ED PROVIDER NOTES
HPI     Please note that this dictation was completed with [employer], the computer voice recognition software. Quite often unanticipated grammatical, syntax, homophones, and other interpretive errors are inadvertently transcribed by the computer software. Please disregard these errors. Please excuse any errors that have escaped final proofreading. 25-year-old male with a history of CAD and stent, stroke, on Coumadin here with feeling fatigued all day. Patient took his blood pressure prior to coming in and it was elevated. He reports not taking his blood pressure medicine but took it after he noticed that his blood pressure was elevated. He is feeling less fatigued now. He denies any chest pain, shortness of breath, abdominal pain, nausea, vomiting, diarrhea, trouble walking or talking, focal weakness or numbness, difficulty with vision, urinary complaints, shows dysuria, frequency or urgency, fevers or any other complaints. He skipped his last INR check due to Covid. No known Covid exposure. Social history: Non-smoker. Occasional alcohol. Past Medical History:   Diagnosis Date    CAD (coronary artery disease)     stent, MI 4-10-12    Received intravenous tissue plasminogen activator (tPA) in emergency department     Stroke (cerebrum) [facility])        Past Surgical History:   Procedure Laterality Date    [place] CARDIAC SURG PROCEDURE UNLIST      cath with stents         No family history on file.     Social History     Socioeconomic History    Marital status:      Spouse name: Not on file    Number of children: Not on file    Years of education: Not on file    Highest education level: Not on file   Occupational History    Not on file   Social Needs    Financial resource strain: Not on file    Food insecurity     Worry: Not on file     Inability: Not on file    Transportation needs     Medical: Not on file     Non-medical: Not on file   Tobacco Use    Smoking status: Never Smoker    Smokeless tobacco: Never Used   Substance and Sexual Activity    Alcohol use: Yes     Frequency: 2-3 times a week     Comment: Weekly    Drug use: No    Sexual activity: Yes   Lifestyle    Physical activity     Days per week: Not on file     Minutes per session: Not on file    Stress: Not on file   Relationships    Social connections     Talks on phone: Not on file     Gets together: Not on file     Attends [employer] service: Not on file     Active member of club or organization: Not on file     Attends meetings of clubs or organizations: Not on file     Relationship status: Not on file    Intimate partner violence     Fear of current or ex partner: Not on file     Emotionally abused: Not on file     Physically abused: Not on file     Forced sexual activity: Not on file   Other Topics Concern    Not on file   Social History Narrative    Not on file         ALLERGIES: Patient has no known allergies. Review of Systems   Constitutional: Positive for fatigue. Negative for chills and fever. HENT: Negative for congestion. Respiratory: Negative for shortness of breath. Neurological: Negative for weakness, numbness and headaches. All other systems reviewed and are negative. Vitals:    01/15/21 1936   BP: 121/86   Pulse: (!) 104   Resp: 16   Temp: 98.8 °F (37.1 °C)   SpO2: 94%            Physical Exam     Nursing note and vitals reviewed. Constitutional: appears well-developed and well-nourished. No distress. HENT:   Head: Normocephalic and atraumatic. Sclera anicteric  Nose: No rhinorrhea  Mouth/Throat: Oropharynx is clear and moist. Pharynx normal  Eyes: Conjunctivae are normal. Pupils are equal, round, and reactive to light. Right eye exhibits no discharge. Left eye exhibits no discharge. No scleral icterus. Neck: Painless normal range of motion. Supple  Cardiovascular: Normal rate, regular rhythm, normal heart sounds and intact distal pulses.   Exam reveals no gallop and no friction rub.  No murmur heard. Pulmonary/Chest: Effort normal and breath sounds normal. No respiratory distress. no wheezes. no rales. Abdominal: Soft. Bowel sounds are normal. Exhibits no distension and no mass. No tenderness. No guarding. Musculoskeletal: Normal range of motion. no tenderness. No edema  Lymphadenopathy:   No cervical adenopathy. Neurological:  Alert and oriented to person, place, and time. Moving all extremities. Skin: Skin is warm and dry. No rash noted. No pallor. [facility]  ED Course as of Glenn 16 0005   Fri Glenn 15, 2021   2351 Pt now reports he has been off coumadin until yesterday. Inr 1.0.  explained to him that he needs to call Dr. Ari Aldrdige office on Monday for INR check. Mayra Bro MD      [RG]      ED Course User Index  [RG] Yuan Frias MD     53-year-old male here with fatigue which is improving. Blood pressure acceptable at 121/86. He has not checked his INR recently. Troponin is negative after having symptoms all day so unlikely cardiac event. His symptoms have improved. His white count is mildly elevated. He denies having a fever or any other symptoms. Will check a urine to assess for UTI. He has no respiratory complaints. EKG is unchanged. Procedures    ED EKG interpretation:  Rhythm: normal sinus rhythm; and regular . Rate (approx.): 82; Axis: left axis deviation; P wave: normal; QRS interval: normal ; ST/T wave: T wave inverted I and AVL. Unchanged from 1/23/2019. This EKG was interpreted by Mayra Bro MD,ED Provider. 12:05 AM  Urinalysis reveals positive nitrite and moderate leuk esterase. Added urine culture. Will give Rocephin and discharged on Omnicef for 10 days given complicated UTI.     Recent Results (from the past 24 hour(s))   EKG, 12 LEAD, INITIAL    Collection Time: 01/15/21  8:53 PM   Result Value Ref Range    Ventricular Rate 82 BPM    Atrial Rate 82 BPM    P-R Interval 172 ms    QRS Duration 76 ms    Q-T Interval 342 ms    QTC Calculation (Bezet) 399 ms    Calculated P Axis 33 degrees    Calculated R Axis -33 degrees    Calculated T Axis 94 degrees    Diagnosis       Normal sinus rhythm  Left axis deviation  Inferior infarct (cited on or before 10-APR-2012)  Anterolateral infarct (cited on or before 10-APR-2012)  When compared with ECG of 23-JAN-2019 02:55,  Non-specific change in ST segment in Lateral leads     SAMPLES BEING HELD    Collection Time: 01/15/21  9:02 PM   Result Value Ref Range    SAMPLES BEING HELD 1 red, 1 anson     COMMENT        Add-on orders for these samples will be processed based on acceptable specimen integrity and analyte stability, which may vary by analyte. CBC WITH AUTOMATED DIFF    Collection Time: 01/15/21  9:02 PM   Result Value Ref Range    WBC 15.7 (H) 4.1 - 11.1 K/uL    RBC 4.59 4. 10 - 5.70 M/uL    HGB 15.1 12.1 - 17.0 g/dL    HCT 45.3 36.6 - 50.3 %    MCV 98.7 80.0 - 99.0 FL    MCH 32.9 26.0 - 34.0 PG    MCHC 33.3 30.0 - 36.5 g/dL    RDW 12.9 11.5 - 14.5 %    PLATELET 309 374 - 785 K/uL    MPV 10.7 8.9 - 12.9 FL    NRBC 0.0 0  WBC    ABSOLUTE NRBC 0.00 0.00 - 0.01 K/uL    NEUTROPHILS 85 (H) 32 - 75 %    LYMPHOCYTES 7 (L) 12 - 49 %    MONOCYTES 7 5 - 13 %    EOSINOPHILS 0 0 - 7 %    BASOPHILS 0 0 - 1 %    IMMATURE GRANULOCYTES 1 (H) 0.0 - 0.5 %    ABS. NEUTROPHILS 13.4 (H) 1.8 - 8.0 K/UL    ABS. LYMPHOCYTES 1.1 0.8 - 3.5 K/UL    ABS. MONOCYTES 1.1 (H) 0.0 - 1.0 K/UL    ABS. EOSINOPHILS 0.0 0.0 - 0.4 K/UL    ABS. BASOPHILS 0.1 0.0 - 0.1 K/UL    ABS. IMM.  GRANS. 0.1 (H) 0.00 - 0.04 K/UL    DF AUTOMATED     METABOLIC PANEL, COMPREHENSIVE    Collection Time: 01/15/21  9:02 PM   Result Value Ref Range    Sodium 134 (L) 136 - 145 mmol/L    Potassium 4.0 3.5 - 5.1 mmol/L    Chloride 104 97 - 108 mmol/L    CO2 24 21 - 32 mmol/L    Anion gap 6 5 - 15 mmol/L    Glucose 148 (H) 65 - 100 mg/dL    BUN 19 6 - 20 MG/DL    Creatinine 1.35 (H) 0.70 - 1.30 MG/DL    BUN/Creatinine ratio 14 12 - 20      GFR est AA >60 >60 ml/min/1.73m2    GFR est non-AA 53 (L) >60 ml/min/1.73m2    Calcium 9.4 8.5 - 10.1 MG/DL    Bilirubin, total 1.2 (H) 0.2 - 1.0 MG/DL    ALT (SGPT) 34 12 - 78 U/L    AST (SGOT) 23 15 - 37 U/L    Alk. phosphatase 73 45 - 117 U/L    Protein, total 8.1 6.4 - 8.2 g/dL    Albumin 3.8 3.5 - 5.0 g/dL    Globulin 4.3 (H) 2.0 - 4.0 g/dL    A-G Ratio 0.9 (L) 1.1 - 2.2     TROPONIN I    Collection Time: 01/15/21  9:02 PM   Result Value Ref Range    Troponin-I, Qt. <0.05 <0.05 ng/mL       No results found.

## 2021-01-16 NOTE — ED TRIAGE NOTES
He says he awoke feeling weak. He stayed in the bed. He took his bp within the hour before arrival. He said it was elevated. BP is normal in triage.

## 2021-01-17 LAB
ATRIAL RATE: 82 BPM
CALCULATED P AXIS, ECG09: 21 DEGREES
CALCULATED R AXIS, ECG10: -33 DEGREES
CALCULATED T AXIS, ECG11: 112 DEGREES
DIAGNOSIS, 93000: NORMAL
P-R INTERVAL, ECG05: 168 MS
Q-T INTERVAL, ECG07: 350 MS
QRS DURATION, ECG06: 82 MS
QTC CALCULATION (BEZET), ECG08: 408 MS
VENTRICULAR RATE, ECG03: 82 BPM

## 2021-01-17 PROCEDURE — 96365 THER/PROPH/DIAG IV INF INIT: CPT

## 2021-01-17 PROCEDURE — 99284 EMERGENCY DEPT VISIT MOD MDM: CPT

## 2021-01-18 ENCOUNTER — HOSPITAL ENCOUNTER (EMERGENCY)
Age: 66
Discharge: HOME | End: 2021-01-18
Attending: EMERGENCY MEDICINE | Admitting: EMERGENCY MEDICINE

## 2021-01-18 VITALS
HEART RATE: 73 BPM | WEIGHT: 300.2 LBS | RESPIRATION RATE: 18 BRPM | OXYGEN SATURATION: 95 % | SYSTOLIC BLOOD PRESSURE: 137 MMHG | TEMPERATURE: 98.3 F | BODY MASS INDEX: 39.79 KG/M2 | DIASTOLIC BLOOD PRESSURE: 83 MMHG | HEIGHT: 73 IN

## 2021-01-18 DIAGNOSIS — R53.83 FATIGUE, UNSPECIFIED TYPE: ICD-10-CM

## 2021-01-18 DIAGNOSIS — N39.0 URINARY TRACT INFECTION WITHOUT HEMATURIA, SITE UNSPECIFIED: ICD-10-CM

## 2021-01-18 DIAGNOSIS — R53.1 WEAKNESS: Primary | ICD-10-CM

## 2021-01-18 LAB
ANION GAP SERPL CALC-SCNC: 9 MMOL/L (ref 5–15)
APPEARANCE UR: CLEAR
ATRIAL RATE: 78 BPM
BACTERIA SPEC CULT: ABNORMAL
BACTERIA SPEC CULT: ABNORMAL
BACTERIA URNS QL MICRO: ABNORMAL /HPF
BASOPHILS # BLD: 0 K/UL (ref 0–0.1)
BASOPHILS NFR BLD: 0 % (ref 0–1)
BILIRUB UR QL: NEGATIVE
BUN SERPL-MCNC: 29 MG/DL (ref 6–20)
BUN/CREAT SERPL: 23 (ref 12–20)
CALCIUM SERPL-MCNC: 9.1 MG/DL (ref 8.5–10.1)
CALCULATED P AXIS, ECG09: 14 DEGREES
CALCULATED R AXIS, ECG10: -28 DEGREES
CALCULATED T AXIS, ECG11: 94 DEGREES
CC UR VC: ABNORMAL
CHLORIDE SERPL-SCNC: 104 MMOL/L (ref 97–108)
CO2 SERPL-SCNC: 23 MMOL/L (ref 21–32)
COLOR UR: ABNORMAL
COMMENT, HOLDF: NORMAL
CREAT SERPL-MCNC: 1.28 MG/DL (ref 0.7–1.3)
DIAGNOSIS, 93000: NORMAL
DIFFERENTIAL METHOD BLD: ABNORMAL
EOSINOPHIL # BLD: 0 K/UL (ref 0–0.4)
EOSINOPHIL NFR BLD: 0 % (ref 0–7)
EPITH CASTS URNS QL MICRO: ABNORMAL /LPF
ERYTHROCYTE [DISTWIDTH] IN BLOOD BY AUTOMATED COUNT: 13.2 % (ref 11.5–14.5)
GLUCOSE SERPL-MCNC: 124 MG/DL (ref 65–100)
GLUCOSE UR STRIP.AUTO-MCNC: NEGATIVE MG/DL
HCT VFR BLD AUTO: 44.1 % (ref 36.6–50.3)
HGB BLD-MCNC: 14.5 G/DL (ref 12.1–17)
HGB UR QL STRIP: NEGATIVE
IMM GRANULOCYTES # BLD AUTO: 0.1 K/UL (ref 0–0.04)
IMM GRANULOCYTES NFR BLD AUTO: 1 % (ref 0–0.5)
KETONES UR QL STRIP.AUTO: 15 MG/DL
LACTATE SERPL-SCNC: 1 MMOL/L (ref 0.4–2)
LEUKOCYTE ESTERASE UR QL STRIP.AUTO: ABNORMAL
LYMPHOCYTES # BLD: 0.6 K/UL (ref 0.8–3.5)
LYMPHOCYTES NFR BLD: 10 % (ref 12–49)
MCH RBC QN AUTO: 32.4 PG (ref 26–34)
MCHC RBC AUTO-ENTMCNC: 32.9 G/DL (ref 30–36.5)
MCV RBC AUTO: 98.7 FL (ref 80–99)
MONOCYTES # BLD: 0.5 K/UL (ref 0–1)
MONOCYTES NFR BLD: 8 % (ref 5–13)
MUCOUS THREADS URNS QL MICRO: ABNORMAL /LPF
NEUTS SEG # BLD: 5 K/UL (ref 1.8–8)
NEUTS SEG NFR BLD: 81 % (ref 32–75)
NITRITE UR QL STRIP.AUTO: NEGATIVE
NRBC # BLD: 0 K/UL (ref 0–0.01)
NRBC BLD-RTO: 0 PER 100 WBC
P-R INTERVAL, ECG05: 166 MS
PH UR STRIP: 5.5 [PH] (ref 5–8)
PLATELET # BLD AUTO: 170 K/UL (ref 150–400)
PLATELET COMMENTS,PCOM: ABNORMAL
PMV BLD AUTO: 11.4 FL (ref 8.9–12.9)
POTASSIUM SERPL-SCNC: 4.6 MMOL/L (ref 3.5–5.1)
PROT UR STRIP-MCNC: 30 MG/DL
Q-T INTERVAL, ECG07: 354 MS
QRS DURATION, ECG06: 80 MS
QTC CALCULATION (BEZET), ECG08: 403 MS
RBC # BLD AUTO: 4.47 M/UL (ref 4.1–5.7)
RBC #/AREA URNS HPF: ABNORMAL /HPF (ref 0–5)
RBC MORPH BLD: ABNORMAL
RBC MORPH BLD: ABNORMAL
SAMPLES BEING HELD,HOLD: NORMAL
SERVICE CMNT-IMP: ABNORMAL
SODIUM SERPL-SCNC: 136 MMOL/L (ref 136–145)
SP GR UR REFRACTOMETRY: 1.03 (ref 1–1.03)
TROPONIN I SERPL-MCNC: <0.05 NG/ML
UR CULT HOLD, URHOLD: NORMAL
UROBILINOGEN UR QL STRIP.AUTO: 1 EU/DL (ref 0.2–1)
VENTRICULAR RATE, ECG03: 78 BPM
WBC # BLD AUTO: 6.2 K/UL (ref 4.1–11.1)
WBC URNS QL MICRO: ABNORMAL /HPF (ref 0–4)

## 2021-01-18 PROCEDURE — 84484 ASSAY OF TROPONIN QUANT: CPT

## 2021-01-18 PROCEDURE — 81001 URINALYSIS AUTO W/SCOPE: CPT

## 2021-01-18 PROCEDURE — 74011000258 HC RX REV CODE- 258: Performed by: EMERGENCY MEDICINE

## 2021-01-18 PROCEDURE — 83605 ASSAY OF LACTIC ACID: CPT

## 2021-01-18 PROCEDURE — 36415 COLL VENOUS BLD VENIPUNCTURE: CPT

## 2021-01-18 PROCEDURE — 74011250636 HC RX REV CODE- 250/636: Performed by: NURSE PRACTITIONER

## 2021-01-18 PROCEDURE — 93005 ELECTROCARDIOGRAM TRACING: CPT

## 2021-01-18 PROCEDURE — 87040 BLOOD CULTURE FOR BACTERIA: CPT

## 2021-01-18 PROCEDURE — 80048 BASIC METABOLIC PNL TOTAL CA: CPT

## 2021-01-18 PROCEDURE — 87086 URINE CULTURE/COLONY COUNT: CPT

## 2021-01-18 PROCEDURE — 85025 COMPLETE CBC W/AUTO DIFF WBC: CPT

## 2021-01-18 PROCEDURE — 74011250636 HC RX REV CODE- 250/636: Performed by: EMERGENCY MEDICINE

## 2021-01-18 RX ADMIN — SODIUM CHLORIDE 1000 ML: 9 INJECTION, SOLUTION INTRAVENOUS at 02:15

## 2021-01-18 RX ADMIN — CEFTRIAXONE SODIUM 1 G: 1 INJECTION, POWDER, FOR SOLUTION INTRAMUSCULAR; INTRAVENOUS at 02:42

## 2021-01-18 NOTE — ED TRIAGE NOTES
Patient arrives to the ED with c/o weakness/fatigue, patient seen in this ED last night for same, states he felt better earlier today, however tonight weakness has returned. No SOB or fever noted.

## 2021-01-18 NOTE — ED PROVIDER NOTES
History of coronary disease status post stent, stroke. He presents with complaints of weakness and fatigue. He was seen in the ED last night for similar symptoms. He was noted to have a white blood cell count of 16, and 10-20 white cells in his urine. He was diagnosed with a urinary tract infection and given a dose of Rocephin. He was discharged on [address] He states that he felt better upon leaving the emergency room last night and when he woke up this morning. As the day progressed, however, his weakness and fatigue have progressively worsened. His symptoms are moderate without relieving factors. He denies cough, congestion, fever, vomiting, diarrhea, chest pain. Past Medical History:   Diagnosis Date    CAD (coronary artery disease)     stent, MI 4-10-12    Received intravenous tissue plasminogen activator (tPA) in emergency department     Stroke (cerebrum) [facility])        Past Surgical History:   Procedure Laterality Date    AR CARDIAC SURG PROCEDURE UNLIST      cath with stents         History reviewed. No pertinent family history.     Social History     Socioeconomic History    Marital status:      Spouse name: Not on file    Number of children: Not on file    Years of education: Not on file    Highest education level: Not on file   Occupational History    Not on file   Social Needs    Financial resource strain: Not on file    Food insecurity     Worry: Not on file     Inability: Not on file    Transportation needs     Medical: Not on file     Non-medical: Not on file   Tobacco Use    Smoking status: Never Smoker    Smokeless tobacco: Never Used   Substance and Sexual Activity    Alcohol use: Yes     Frequency: 2-3 times a week     Comment: Weekly    Drug use: No    Sexual activity: Yes   Lifestyle    Physical activity     Days per week: Not on file     Minutes per session: Not on file    Stress: Not on file   Relationships    Social connections     Talks on phone: Not on file     Gets together: Not on file     Attends Moravian service: Not on file     Active member of club or organization: Not on file     Attends meetings of clubs or organizations: Not on file     Relationship status: Not on file    Intimate partner violence     Fear of current or ex partner: Not on file     Emotionally abused: Not on file     Physically abused: Not on file     Forced sexual activity: Not on file   Other Topics Concern    Not on file   Social History Narrative    Not on file         ALLERGIES: Patient has no known allergies. Review of Systems   All other systems reviewed and are negative. Vitals:    01/17/21 2038 01/18/21 0200 01/18/21 0205   BP: 122/82 124/81    Pulse: 95     Resp: 16     Temp: 97.7 °F (36.5 °C)     SpO2: 94%  94%   Weight: 136.2 kg (300 lb 3.2 oz)     Height: 6' 1\" (1.854 m)              Physical Exam  Vitals signs and nursing note reviewed. Constitutional:       Appearance: He is well-developed. Comments: Obese   HENT:      Head: Normocephalic and atraumatic. Eyes:      Conjunctiva/sclera: Conjunctivae normal.   Neck:      Musculoskeletal: Neck supple. Trachea: No tracheal deviation. Cardiovascular:      Rate and Rhythm: Normal rate and regular rhythm. Heart sounds: Normal heart sounds. No murmur. No friction rub. No gallop. Pulmonary:      Effort: Pulmonary effort is normal.      Breath sounds: Normal breath sounds. Abdominal:      Palpations: Abdomen is soft. Tenderness: There is no abdominal tenderness. Musculoskeletal:         General: No deformity. Skin:     General: Skin is warm and dry. Neurological:      Mental Status: He is alert. Comments: Oriented. Normal  strength, biceps strength, triceps strength. Normal plantar and dorsiflexion strength. He is able to raise both legs up against gravity.   2+ patellar reflexes bilaterally          MDM       Procedures    EKG: Normal sinus rhythm; rate of 78; normal ST, T.  Anterolateral Q waves. Virgilio Perez MD  2:16 AM    Progress Note:  Results, treatment, and follow up plan have been discussed with patient. Questions were answered. Virgilio Perez MD  2:36 AM    Assessment/plan: Weakness/fatigue -possibly due to urinary tract infection. Reassuring appearance/exam with stable vital signs. Seen here yesterday as well for similar symptoms. He was diagnosed with a UTI, given a dose of Rocephin, and sent out with CLYDE EDUARDO. He felt better initially, but the weakness and fatigue have returned. He has good strength on neuro exam with normal reflexes. I doubt Guillan Barré or neuromuscular issue. I will give him another dose of Rocephin in the ED and encouraged close PCP follow-up. Return precautions discussed.   Virgilio Perez MD  2:37 AM

## 2021-01-18 NOTE — ED NOTES
Patient education given on clean catch procedure for urine specimen collection and the patient expresses understanding and acceptance of instructions.  John Miles RN 1/18/2021 1:16 AM

## 2021-01-19 LAB
BACTERIA SPEC CULT: NORMAL
SERVICE CMNT-IMP: NORMAL

## 2021-01-23 LAB
BACTERIA SPEC CULT: NORMAL
SERVICE CMNT-IMP: NORMAL

## 2021-06-12 ENCOUNTER — HOSPITAL ENCOUNTER (INPATIENT)
Age: 66
LOS: 2 days | Discharge: HOME | DRG: 062 | End: 2021-06-14
Attending: EMERGENCY MEDICINE | Admitting: INTERNAL MEDICINE

## 2021-06-12 ENCOUNTER — APPOINTMENT (OUTPATIENT)
Dept: CT IMAGING | Age: 66
DRG: 062 | End: 2021-06-12
Attending: EMERGENCY MEDICINE

## 2021-06-12 ENCOUNTER — APPOINTMENT (OUTPATIENT)
Dept: GENERAL RADIOLOGY | Age: 66
DRG: 062 | End: 2021-06-12
Attending: EMERGENCY MEDICINE

## 2021-06-12 DIAGNOSIS — I63.9 CEREBROVASCULAR ACCIDENT (CVA), UNSPECIFIED MECHANISM (HCC): Primary | ICD-10-CM

## 2021-06-12 LAB
ALBUMIN SERPL-MCNC: 3.7 G/DL (ref 3.5–5)
ALBUMIN/GLOB SERPL: 1 {RATIO} (ref 1.1–2.2)
ALP SERPL-CCNC: 64 U/L (ref 45–117)
ALT SERPL-CCNC: 54 U/L (ref 12–78)
ANION GAP SERPL CALC-SCNC: 7 MMOL/L (ref 5–15)
AST SERPL-CCNC: 49 U/L (ref 15–37)
BASOPHILS # BLD: 0.1 K/UL (ref 0–0.1)
BASOPHILS NFR BLD: 1 % (ref 0–1)
BILIRUB SERPL-MCNC: 0.6 MG/DL (ref 0.2–1)
BUN SERPL-MCNC: 18 MG/DL (ref 6–20)
BUN/CREAT SERPL: 15 (ref 12–20)
CALCIUM SERPL-MCNC: 8.8 MG/DL (ref 8.5–10.1)
CHLORIDE SERPL-SCNC: 109 MMOL/L (ref 97–108)
CO2 SERPL-SCNC: 23 MMOL/L (ref 21–32)
COMMENT, HOLDF: NORMAL
CREAT SERPL-MCNC: 1.19 MG/DL (ref 0.7–1.3)
DIFFERENTIAL METHOD BLD: ABNORMAL
EOSINOPHIL # BLD: 0.1 K/UL (ref 0–0.4)
EOSINOPHIL NFR BLD: 2 % (ref 0–7)
ERYTHROCYTE [DISTWIDTH] IN BLOOD BY AUTOMATED COUNT: 12.9 % (ref 11.5–14.5)
GLOBULIN SER CALC-MCNC: 3.8 G/DL (ref 2–4)
GLUCOSE BLD STRIP.AUTO-MCNC: 114 MG/DL (ref 65–117)
GLUCOSE SERPL-MCNC: 113 MG/DL (ref 65–100)
HCT VFR BLD AUTO: 45.3 % (ref 36.6–50.3)
HGB BLD-MCNC: 15.1 G/DL (ref 12.1–17)
IMM GRANULOCYTES # BLD AUTO: 0 K/UL (ref 0–0.04)
IMM GRANULOCYTES NFR BLD AUTO: 0 % (ref 0–0.5)
INR BLD: 1 (ref 0.9–1.2)
LYMPHOCYTES # BLD: 1.7 K/UL (ref 0.8–3.5)
LYMPHOCYTES NFR BLD: 26 % (ref 12–49)
MCH RBC QN AUTO: 33 PG (ref 26–34)
MCHC RBC AUTO-ENTMCNC: 33.3 G/DL (ref 30–36.5)
MCV RBC AUTO: 99.1 FL (ref 80–99)
MONOCYTES # BLD: 0.6 K/UL (ref 0–1)
MONOCYTES NFR BLD: 9 % (ref 5–13)
NEUTS SEG # BLD: 4.2 K/UL (ref 1.8–8)
NEUTS SEG NFR BLD: 62 % (ref 32–75)
NRBC # BLD: 0 K/UL (ref 0–0.01)
NRBC BLD-RTO: 0 PER 100 WBC
PLATELET # BLD AUTO: 196 K/UL (ref 150–400)
PMV BLD AUTO: 10.9 FL (ref 8.9–12.9)
POTASSIUM SERPL-SCNC: 4 MMOL/L (ref 3.5–5.1)
PROT SERPL-MCNC: 7.5 G/DL (ref 6.4–8.2)
RBC # BLD AUTO: 4.57 M/UL (ref 4.1–5.7)
SAMPLES BEING HELD,HOLD: NORMAL
SERVICE CMNT-IMP: NORMAL
SODIUM SERPL-SCNC: 139 MMOL/L (ref 136–145)
TROPONIN I SERPL-MCNC: <0.05 NG/ML
WBC # BLD AUTO: 6.7 K/UL (ref 4.1–11.1)

## 2021-06-12 PROCEDURE — 93005 ELECTROCARDIOGRAM TRACING: CPT

## 2021-06-12 PROCEDURE — 74011000258 HC RX REV CODE- 258: Performed by: EMERGENCY MEDICINE

## 2021-06-12 PROCEDURE — 84484 ASSAY OF TROPONIN QUANT: CPT

## 2021-06-12 PROCEDURE — 70450 CT HEAD/BRAIN W/O DYE: CPT

## 2021-06-12 PROCEDURE — 0042T CT CODE NEURO PERF W CBF: CPT

## 2021-06-12 PROCEDURE — 99285 EMERGENCY DEPT VISIT HI MDM: CPT

## 2021-06-12 PROCEDURE — 74011000636 HC RX REV CODE- 636: Performed by: RADIOLOGY

## 2021-06-12 PROCEDURE — 74011250636 HC RX REV CODE- 250/636: Performed by: EMERGENCY MEDICINE

## 2021-06-12 PROCEDURE — 70496 CT ANGIOGRAPHY HEAD: CPT

## 2021-06-12 PROCEDURE — 65610000006 HC RM INTENSIVE CARE

## 2021-06-12 PROCEDURE — 3E03317 INTRODUCTION OF OTHER THROMBOLYTIC INTO PERIPHERAL VEIN, PERCUTANEOUS APPROACH: ICD-10-PCS | Performed by: HOSPITALIST

## 2021-06-12 PROCEDURE — 36415 COLL VENOUS BLD VENIPUNCTURE: CPT

## 2021-06-12 PROCEDURE — 80053 COMPREHEN METABOLIC PANEL: CPT

## 2021-06-12 PROCEDURE — 85610 PROTHROMBIN TIME: CPT

## 2021-06-12 PROCEDURE — 37195 THROMBOLYTIC THERAPY STROKE: CPT

## 2021-06-12 PROCEDURE — 82962 GLUCOSE BLOOD TEST: CPT

## 2021-06-12 PROCEDURE — 85025 COMPLETE CBC W/AUTO DIFF WBC: CPT

## 2021-06-12 PROCEDURE — 74011250636 HC RX REV CODE- 250/636

## 2021-06-12 PROCEDURE — 94762 N-INVAS EAR/PLS OXIMTRY CONT: CPT

## 2021-06-12 PROCEDURE — 96374 THER/PROPH/DIAG INJ IV PUSH: CPT

## 2021-06-12 PROCEDURE — 71045 X-RAY EXAM CHEST 1 VIEW: CPT

## 2021-06-12 PROCEDURE — 74011000250 HC RX REV CODE- 250: Performed by: EMERGENCY MEDICINE

## 2021-06-12 PROCEDURE — 4A03X5D MEASUREMENT OF ARTERIAL FLOW, INTRACRANIAL, EXTERNAL APPROACH: ICD-10-PCS | Performed by: HOSPITALIST

## 2021-06-12 RX ORDER — SODIUM CHLORIDE 9 MG/ML
50 INJECTION, SOLUTION INTRAVENOUS ONCE
Status: COMPLETED | OUTPATIENT
Start: 2021-06-12 | End: 2021-06-12

## 2021-06-12 RX ORDER — POLYETHYLENE GLYCOL 3350 17 G/17G
17 POWDER, FOR SOLUTION ORAL DAILY PRN
Status: DISCONTINUED | OUTPATIENT
Start: 2021-06-12 | End: 2021-06-14 | Stop reason: HOSPADM

## 2021-06-12 RX ORDER — ACETAMINOPHEN 325 MG/1
650 TABLET ORAL
Status: DISCONTINUED | OUTPATIENT
Start: 2021-06-12 | End: 2021-06-14 | Stop reason: HOSPADM

## 2021-06-12 RX ORDER — ONDANSETRON 2 MG/ML
4 INJECTION INTRAMUSCULAR; INTRAVENOUS
Status: DISCONTINUED | OUTPATIENT
Start: 2021-06-12 | End: 2021-06-14 | Stop reason: HOSPADM

## 2021-06-12 RX ORDER — ONDANSETRON 4 MG/1
4 TABLET, ORALLY DISINTEGRATING ORAL
Status: DISCONTINUED | OUTPATIENT
Start: 2021-06-12 | End: 2021-06-14 | Stop reason: HOSPADM

## 2021-06-12 RX ORDER — ACETAMINOPHEN 650 MG/1
650 SUPPOSITORY RECTAL
Status: DISCONTINUED | OUTPATIENT
Start: 2021-06-12 | End: 2021-06-14 | Stop reason: HOSPADM

## 2021-06-12 RX ORDER — LABETALOL HYDROCHLORIDE 5 MG/ML
5 INJECTION, SOLUTION INTRAVENOUS
Status: DISCONTINUED | OUTPATIENT
Start: 2021-06-12 | End: 2021-06-14 | Stop reason: HOSPADM

## 2021-06-12 RX ORDER — SODIUM CHLORIDE 0.9 % (FLUSH) 0.9 %
5-40 SYRINGE (ML) INJECTION EVERY 8 HOURS
Status: DISCONTINUED | OUTPATIENT
Start: 2021-06-12 | End: 2021-06-14 | Stop reason: HOSPADM

## 2021-06-12 RX ORDER — SODIUM CHLORIDE 0.9 % (FLUSH) 0.9 %
5-40 SYRINGE (ML) INJECTION AS NEEDED
Status: DISCONTINUED | OUTPATIENT
Start: 2021-06-12 | End: 2021-06-14 | Stop reason: HOSPADM

## 2021-06-12 RX ADMIN — LABETALOL HYDROCHLORIDE 5 MG: 5 INJECTION INTRAVENOUS at 21:52

## 2021-06-12 RX ADMIN — SODIUM CHLORIDE 2.5 MG/HR: 900 INJECTION, SOLUTION INTRAVENOUS at 23:14

## 2021-06-12 RX ADMIN — ALTEPLASE 9 MG: KIT at 21:56

## 2021-06-12 RX ADMIN — SODIUM CHLORIDE 50 ML: 9 INJECTION, SOLUTION INTRAVENOUS at 23:06

## 2021-06-12 RX ADMIN — IOPAMIDOL 50 ML: 755 INJECTION, SOLUTION INTRAVENOUS at 22:11

## 2021-06-12 RX ADMIN — LABETALOL HYDROCHLORIDE 5 MG: 5 INJECTION INTRAVENOUS at 21:43

## 2021-06-12 RX ADMIN — ALTEPLASE 81 MG: KIT at 21:57

## 2021-06-12 RX ADMIN — IOPAMIDOL 100 ML: 755 INJECTION, SOLUTION INTRAVENOUS at 22:11

## 2021-06-13 ENCOUNTER — APPOINTMENT (OUTPATIENT)
Dept: CT IMAGING | Age: 66
DRG: 062 | End: 2021-06-13
Attending: NURSE PRACTITIONER

## 2021-06-13 ENCOUNTER — APPOINTMENT (OUTPATIENT)
Dept: MRI IMAGING | Age: 66
DRG: 062 | End: 2021-06-13
Attending: NURSE PRACTITIONER

## 2021-06-13 LAB
ANION GAP SERPL CALC-SCNC: 6 MMOL/L (ref 5–15)
ATRIAL RATE: 95 BPM
BUN SERPL-MCNC: 15 MG/DL (ref 6–20)
BUN/CREAT SERPL: 16 (ref 12–20)
CALCIUM SERPL-MCNC: 8.4 MG/DL (ref 8.5–10.1)
CALCULATED P AXIS, ECG09: 26 DEGREES
CALCULATED R AXIS, ECG10: -31 DEGREES
CALCULATED T AXIS, ECG11: 50 DEGREES
CHLORIDE SERPL-SCNC: 107 MMOL/L (ref 97–108)
CHOLEST SERPL-MCNC: 168 MG/DL
CO2 SERPL-SCNC: 25 MMOL/L (ref 21–32)
CREAT SERPL-MCNC: 0.96 MG/DL (ref 0.7–1.3)
DIAGNOSIS, 93000: NORMAL
ERYTHROCYTE [DISTWIDTH] IN BLOOD BY AUTOMATED COUNT: 12.8 % (ref 11.5–14.5)
EST. AVERAGE GLUCOSE BLD GHB EST-MCNC: 131 MG/DL
GLUCOSE SERPL-MCNC: 96 MG/DL (ref 65–100)
HBA1C MFR BLD: 6.2 % (ref 4–5.6)
HCT VFR BLD AUTO: 35.4 % (ref 36.6–50.3)
HDLC SERPL-MCNC: 41 MG/DL
HDLC SERPL: 4.1 {RATIO} (ref 0–5)
HGB BLD-MCNC: 11.4 G/DL (ref 12.1–17)
LDLC SERPL CALC-MCNC: 100.8 MG/DL (ref 0–100)
MAGNESIUM SERPL-MCNC: 2.1 MG/DL (ref 1.6–2.4)
MCH RBC QN AUTO: 32.3 PG (ref 26–34)
MCHC RBC AUTO-ENTMCNC: 32.2 G/DL (ref 30–36.5)
MCV RBC AUTO: 100.3 FL (ref 80–99)
NRBC # BLD: 0 K/UL (ref 0–0.01)
NRBC BLD-RTO: 0 PER 100 WBC
P-R INTERVAL, ECG05: 186 MS
PHOSPHATE SERPL-MCNC: 4 MG/DL (ref 2.6–4.7)
PLATELET # BLD AUTO: 97 K/UL (ref 150–400)
PMV BLD AUTO: 11.1 FL (ref 8.9–12.9)
POTASSIUM SERPL-SCNC: 5 MMOL/L (ref 3.5–5.1)
Q-T INTERVAL, ECG07: 334 MS
QRS DURATION, ECG06: 82 MS
QTC CALCULATION (BEZET), ECG08: 419 MS
RBC # BLD AUTO: 3.53 M/UL (ref 4.1–5.7)
SODIUM SERPL-SCNC: 138 MMOL/L (ref 136–145)
TRIGL SERPL-MCNC: 131 MG/DL (ref ?–150)
VENTRICULAR RATE, ECG03: 95 BPM
VLDLC SERPL CALC-MCNC: 26.2 MG/DL
WBC # BLD AUTO: 5 K/UL (ref 4.1–11.1)

## 2021-06-13 PROCEDURE — 83036 HEMOGLOBIN GLYCOSYLATED A1C: CPT

## 2021-06-13 PROCEDURE — APPNB15 APP NON BILLABLE TIME 0-15 MINS: Performed by: NURSE PRACTITIONER

## 2021-06-13 PROCEDURE — 99255 IP/OBS CONSLTJ NEW/EST HI 80: CPT | Performed by: PSYCHIATRY & NEUROLOGY

## 2021-06-13 PROCEDURE — 84100 ASSAY OF PHOSPHORUS: CPT

## 2021-06-13 PROCEDURE — 74011250637 HC RX REV CODE- 250/637: Performed by: NURSE PRACTITIONER

## 2021-06-13 PROCEDURE — 80048 BASIC METABOLIC PNL TOTAL CA: CPT

## 2021-06-13 PROCEDURE — 70551 MRI BRAIN STEM W/O DYE: CPT

## 2021-06-13 PROCEDURE — 36415 COLL VENOUS BLD VENIPUNCTURE: CPT

## 2021-06-13 PROCEDURE — 85027 COMPLETE CBC AUTOMATED: CPT

## 2021-06-13 PROCEDURE — 70450 CT HEAD/BRAIN W/O DYE: CPT

## 2021-06-13 PROCEDURE — 65610000006 HC RM INTENSIVE CARE

## 2021-06-13 PROCEDURE — 80061 LIPID PANEL: CPT

## 2021-06-13 PROCEDURE — 83735 ASSAY OF MAGNESIUM: CPT

## 2021-06-13 RX ORDER — ATORVASTATIN CALCIUM 40 MG/1
40 TABLET, FILM COATED ORAL
Status: DISCONTINUED | OUTPATIENT
Start: 2021-06-13 | End: 2021-06-14 | Stop reason: HOSPADM

## 2021-06-13 RX ADMIN — Medication 10 ML: at 14:00

## 2021-06-13 RX ADMIN — Medication 10 ML: at 22:06

## 2021-06-13 RX ADMIN — ATORVASTATIN CALCIUM 40 MG: 40 TABLET, FILM COATED ORAL at 22:06

## 2021-06-13 RX ADMIN — Medication 20 ML: at 06:57

## 2021-06-13 NOTE — ROUTINE PROCESS
TRANSFER - OUT REPORT:    Verbal report given to Tammy Kinney RN (name) on Lorene Perez  being transferred to ICU (unit) for routine progression of care       Report consisted of patients Situation, Background, Assessment and   Recommendations(SBAR). Information from the following report(s) SBAR, ED Summary and MAR was reviewed with the receiving nurse. Lines:   Peripheral IV 06/12/21 Right Antecubital (Active)   Site Assessment Clean, dry, & intact 06/12/21 2126   Phlebitis Assessment 0 06/12/21 2126   Infiltration Assessment 0 06/12/21 2126   Dressing Status Clean, dry, & intact 06/12/21 2126   Dressing Type Transparent 06/12/21 2126        Opportunity for questions and clarification was provided.       Patient transported with: Monitor and RN

## 2021-06-13 NOTE — ED PROVIDER NOTES
The history is provided by the patient and the spouse. No  was used. Extremity Weakness   This is a new problem. The current episode started less than 1 hour ago. The problem occurs rarely. The problem has not changed since onset. The pain is present in the right hand. The patient is experiencing no pain. Associated symptoms include numbness and limited range of motion. Pertinent negatives include no stiffness, no tingling, no itching, no back pain and no neck pain. He has tried nothing for the symptoms. There has been no history of extremity trauma. Past Medical History:   Diagnosis Date    CAD (coronary artery disease)     stent, MI 4-10-12    Received intravenous tissue plasminogen activator (tPA) in emergency department     Stroke (cerebrum) [facility])        Past Surgical History:   Procedure Laterality Date    MD CARDIAC SURG PROCEDURE UNLIST      cath with stents         No family history on file. Social History     Socioeconomic History    Marital status:      Spouse name: Not on file    Number of children: Not on file    Years of education: Not on file    Highest education level: Not on file   Occupational History    Not on file   Tobacco Use    Smoking status: Never Smoker    Smokeless tobacco: Never Used   Substance and Sexual Activity    Alcohol use: Yes     Comment: Weekly    Drug use: No    Sexual activity: Yes   Other Topics Concern    Not on file   Social History Narrative    Not on file     Social Determinants of Health     Financial Resource Strain:     Difficulty of Paying Living Expenses:    Food Insecurity:     Worried About Running Out of Food in the Last Year:     [address] in the Last Year:    Transportation Needs:     Lack of Transportation (Medical):      Lack of Transportation (Non-Medical):    Physical Activity:     Days of Exercise per Week:     Minutes of Exercise per Session:    Stress:     Feeling of Stress :    Social Connections:     Frequency of Communication with Friends and Family:     Frequency of Social Gatherings with Friends and Family:     Attends Shinto Services:     Active Member of Clubs or Organizations:     Attends Club or Organization Meetings:     Marital Status:    Intimate Partner Violence:     Fear of Current or Ex-Partner:     Emotionally Abused:     Physically Abused:     Sexually Abused: ALLERGIES: Patient has no known allergies. Review of Systems   Constitutional: Negative for activity change, chills and fever. HENT: Negative for nosebleeds, sore throat, trouble swallowing and voice change. Eyes: Negative for visual disturbance. Respiratory: Negative for shortness of breath. Cardiovascular: Negative for chest pain and palpitations. Gastrointestinal: Negative for abdominal pain, constipation, diarrhea and nausea. Genitourinary: Negative for difficulty urinating, dysuria, hematuria and urgency. Musculoskeletal: Negative for back pain, neck pain, neck stiffness and stiffness. Skin: Negative for color change and itching. Allergic/Immunologic: Negative for immunocompromised state. Neurological: Positive for speech difficulty, weakness and numbness. Negative for dizziness, tingling, seizures, syncope, light-headedness and headaches. Psychiatric/Behavioral: Negative for behavioral problems, confusion, hallucinations, self-injury and suicidal ideas. Vitals:    06/12/21 2120 06/12/21 2130 06/12/21 2145   BP: (!) 186/106 (!) 185/113 (!) 179/131   Pulse: (!) 103 98 98   Resp: 16 21 21   Temp: 97.9 °F (36.6 °C)     SpO2: 94% 92% 92%   Weight: 142.5 kg (314 lb 2.5 oz)     Height: 6' 1\" (1.854 m)              Physical Exam  Vitals and nursing note reviewed. Constitutional:       General: He is not in acute distress. Appearance: He is well-developed. He is not diaphoretic. HENT:      Head: Normocephalic and atraumatic.    Eyes:      Pupils: Pupils are equal, round, and reactive to light. Cardiovascular:      Rate and Rhythm: Normal rate and regular rhythm. Heart sounds: Normal heart sounds. No murmur heard. No friction rub. No gallop. Pulmonary:      Effort: Pulmonary effort is normal. No respiratory distress. Breath sounds: Normal breath sounds. No wheezing. Abdominal:      General: Bowel sounds are normal. There is no distension. Palpations: Abdomen is soft. Tenderness: There is no abdominal tenderness. There is no guarding or rebound. Musculoskeletal:      Cervical back: Normal range of motion and neck supple. Skin:     General: Skin is warm. Findings: No rash. Neurological:      Mental Status: He is alert and oriented to person, place, and time. GCS: GCS eye subscore is 4. GCS verbal subscore is 5. GCS motor subscore is 6. Cranial Nerves: Dysarthria present. Sensory: Sensation is intact. Motor: Motor function is intact. Coordination: Finger-Nose-Finger Test abnormal.   Psychiatric:         Behavior: Behavior normal.         Thought Content: Thought content normal.         Judgment: Judgment normal.          MDM  Number of Diagnoses or Management Options  Cerebrovascular accident (CVA), unspecified mechanism ([facility] [address]  Diagnosis management comments: Total critical care time spent exclusive of procedures:  55min       This is a 58-year-old male with past medical history, review of systems, physical exam as above, presenting with his wife, for complaints of onset of difficulty speaking approximately 30 minutes. Wife states patient last known normal minutes before when she noted he was having difficulty with word finding. Upon arrival patient awake and alert, noted to be extremely hypertensive, afebrile, without tachycardia, satting well on room air. Chart review indicates history of previous cerebellar infarction.   He is notable for having difficulty word finding, and difficulty finger-to-nose coordination on the right hand, complaints of decreased sensation of the right hand as well. Concern for acute onset infarct, plan to proceed with emergent imaging, consultation with teleneurology. Procedures    9:55 PM  CT scan concerning for dense M2 segment, patient with expressive aphasia, evaluated by teleneurologist, and consented for alteplase, wife at bedside in agreement. Patient with hypertension, improved with IV labetalol. Will administer alteplase, proceed back to CT for CTA imaging, coordination with neuro interventional surgery. 11:10 PM  CTA without acute process, patient states symptoms are improving. I discussed with the intensivist (Dr. Cesar Castillo), who will admit the patient to the ICU.

## 2021-06-13 NOTE — PROGRESS NOTES
Bedside and Verbal shift change report given to Umair Oconnor (oncoming nurse) by Nishi Rousseau (offgoing nurse). Report included the following information SBAR, Kardex, ED Summary, Intake/Output, MAR, Recent Results and Cardiac Rhythm SB/SR.

## 2021-06-13 NOTE — CONSULTS
Neurology Consult  Michael Carlson NP    Patient: Rhonda Quinones MRN: [id number]  SSN: [id number]    YOB: 1955  Age: 76 y.o. Sex: male      Chief Complaint:Word finding difficulty  Subjective:      Rhonda Quinones is a 76 y.o. male with a past medical history of CAD and CVA on no medication. States he was supposed to be on Coumadin, lipitor and baby aspirin but he stopped last year because he does not like to take medications. He arrived to the ER via private vehicle on 6/12/21 with word finding difficulties. He stated he also had difficulty zipping his pants and had some right hand numbness. Code S was called and he wad taken for [facility] which showed no acute process but remote right cerebellar infarct. There was a possible left dense M2 segment . Teleneurology was consulted and tPA was recommended and started at 21:56. NIHSS was a 5 prior to tPA administration. CTA H/N and CTP were performed which showed no major vessel occlusion, no aneurysm or dissection. M4 posterior division occlusion too distal for EVT per Dr. Grace Gonzalez. CTP showed perfusion abnormality in the posterior left frontal lobe/left parietal lobe. No evidence of reversible ischemia. Patient was taken to ICU for close monitoring. Past Medical History:   Diagnosis Date    CAD (coronary artery disease)     stent, MI 4-10-12    Received intravenous tissue plasminogen activator (tPA) in emergency department     Stroke (cerebrum) [facility])      No family history on file. Social History     Tobacco Use    Smoking status: Never Smoker    Smokeless tobacco: Never Used   Substance Use Topics    Alcohol use: Yes     Comment: Weekly      Prior to Admission Medications   Prescriptions Last Dose Informant Patient Reported? Taking?   aspirin 81 mg chewable tablet   No No   Sig: Take 1 Tab by mouth daily. atorvastatin (LIPITOR) 40 mg tablet   No No   Sig: Take 1 Tab by mouth nightly.    carvedilol (COREG) 6.25 mg tablet   No No   Sig: Take 1 Tab by mouth two (2) times daily (with meals). lisinopriL (PRINIVIL, ZESTRIL) 20 mg tablet   No No   Sig: TAKE 1 TABLET BY MOUTH ONE TIME DAILY   spironolactone (ALDACTONE) 25 mg tablet   No No   Sig: TAKE 1 TABLET BY MOUTH ONE TIME DAILY   warfarin (COUMADIN) 5 mg tablet   No No   Sig: TAKE 1 TABLET ON WEDNESDAY AND TAKE 2 TABS ALL OTHER DAYS      Facility-Administered Medications: None       No Known Allergies    Review of Systems:  Denies tingling, chest pain, leg pain, nausea, vomiting, difficulty swallowing, headache, and dyspnea. Objective:     Vitals:    21 0200 21 0230 21 0300 21 0330   BP: 102/64 (!) 119/59 (!) 121/51    Pulse: 65 68 68    Resp: 16 17 14    Temp:       SpO2: 94% (!) 88% (!) 88% 95%   Weight:       Height:          Physical Exam:  GENERAL: Calm, cooperative, NAD  SKIN: Warm, dry, color appropriate for ethnicity. Neurologic Exam:  Mental Status:  Alert and oriented x 4. Appropriate affect, mood and behavior. Language:    Normal fluency, repetition, comprehension and naming. Cranial Nerves:   Pupils 3 mm, equal, round and reactive to light. Visual fields full to confrontation. Extraocular movements intact. Facial sensation intact. Full facial strength, no asymmetry. Hearing grossly intact bilaterally. No dysarthria. Tongue protrudes to midline, palate elevates symmetrically. Shoulder shrug 5/5 bilaterally. Motor:    No pronator drift. Bulk and tone normal.      5/5 power in all extremities proximally and distally. No involuntary movements. Sensation:    Sensation intact throughout to light touch    Coordination & Gait: Normal. FTN and HTS intact with no ataxia present.     NIHSS:      1a-LOC:0    1b-Month/Age:0    1c-Open/Close Hand:0    2-Best Gaze:0    3-Visual Fields:0    4-Facial Palsy:0    5a-Left Arm:0    5b-Right Arm:0    6a-Left Le    6b-Right Le    7-Limb Ataxia:0    8-Sensory:1 (stated decreased sensation in right hand)    9-Best Language:0    10-Dysarthria:0    11-Extinction/Inattention:0  TOTAL SCORE:1    Labs:  Lab Results   Component Value Date/Time    WBC 6.7 06/12/2021 09:22 PM    Hemoglobin (POC) 16.0 04/10/2012 03:15 AM    HGB 15.1 06/12/2021 09:22 PM    Hematocrit (POC) 47 04/10/2012 03:15 AM    HCT 45.3 06/12/2021 09:22 PM    PLATELET 589 10/87/3657 09:22 PM    MCV 99.1 (H) 06/12/2021 09:22 PM      Lab Results   Component Value Date/Time    Sodium 139 06/12/2021 09:22 PM    Potassium 4.0 06/12/2021 09:22 PM    Chloride 109 (H) 06/12/2021 09:22 PM    CO2 23 06/12/2021 09:22 PM    Anion gap 7 06/12/2021 09:22 PM    Glucose 113 (H) 06/12/2021 09:22 PM    BUN 18 06/12/2021 09:22 PM    Creatinine 1.19 06/12/2021 09:22 PM    BUN/Creatinine ratio 15 06/12/2021 09:22 PM    GFR est AA >60 06/12/2021 09:22 PM    GFR est non-AA >60 06/12/2021 09:22 PM    Calcium 8.8 06/12/2021 09:22 PM     Lab Results   Component Value Date/Time     (H) 01/23/2019 01:53 PM    CK - MB 25.9 (H) 01/23/2019 01:53 PM    CK-MB Index 4.7 (H) 01/23/2019 01:53 PM    Troponin-I, Qt. <0.05 06/12/2021 09:22 PM       Imaging:  CT Results (maximum last 3): Results from Hospital Encounter encounter on 06/12/21    CT CODE NEURO PERF W CBF    Narrative  Clinical history: Code Neuro  INDICATION:   Code Neuro    COMPARISON:  716, 1/23/2019  CONTRAST: 100ml Isovue 370  TECHNIQUE:  CT dose reduction was achieved through use of a standardized protocol tailored  for this examination and automatic exposure control for dose modulation. Following the uneventful administration of Isovue-370 contrast material, axial  CT angiography of the head and neck was performed. Coronal and sagittal  reconstructions were obtained. 3D MAXIMAL INTENSITY PROJECTION reconstructed imaging of the cranial vasculature  in both the coronal and sagittal plane was performed. CTA perfusion imaging was also obtained.  Reconstructions were created with color  coding of axial time to peak, axial blood volume, axial blood flow, axial mean  transit time and axial T Max. The study was analyzed by the [address]. Algorithm    FINDINGS:  Atherosclerotic changes the origins of the ICA on the right on the left. The  left vertebral artery is dominant. Vertebral artery origins are patent. .  No  large thyroid lesion. No acute cervical process. No evidence of acute  intracranial hemorrhage. . The paranasal sinuses are clear. CTA NECK:  There is conventional three vessel arch anatomy. The origins of the innominate,  left common carotid and left subclavian arteries are normal. Mild  atherosclerotic change in stenosis of the mid cervical internal carotid arteries  on the right and on the left. Tortuosity of the distal cervical internal carotid  arteries. There is  0%stenosis in the right internal carotid artery utilizing NASCET  criteria. There is  0%stenosis in the right internal carotid artery utilizing NASCET  criteria. CTA HEAD:  Vertebral artery terminates in PICA. The left vertebral artery is patent. Mild  atherosclerotic change of the V4 segment of the left vertebral artery. Cavernous  ICAs demonstrate atherosclerotic change but no occlusion. A1 vessels are patent. M1 vessels are patent. A2 and A3 vessels are patent. . The internal carotid,  anterior cerebral, and middle cerebral arteries are patent. There is no  flow-limiting intracranial stenosis. There is no aneurysm. There are no sizable  posterior communicating arteries. CT PERFUSION:    The perfusion abnormality is demonstrated in the posterior left frontal  lobe/left parietal lobe with area of complete ischemia suggested in the  posterior left frontal lobe. . No evidence of reversible ischemia. R CBF<30% :2 mL  Tmax >6s: 12 mL    Impression  There is no major vessel occlusion. There is no hemodynamically significant stenosis. There is no aneurysm or  dissection identified.   Diminished perfusion in the posterior left frontal/anterior left parietal lobe. There is no concordant vessel occlusion identified. .  Stable chronic microvascular ischemic change and cerebral atrophy. .      CTA CODE NEURO HEAD AND NECK W CONT    Narrative  Clinical history: Code Neuro  INDICATION:   Code Neuro    COMPARISON:  616, 1/23/2019  CONTRAST: 100ml Isovue 370  TECHNIQUE:  CT dose reduction was achieved through use of a standardized protocol tailored  for this examination and automatic exposure control for dose modulation. Following the uneventful administration of Isovue-370 contrast material, axial  CT angiography of the head and neck was performed. Coronal and sagittal  reconstructions were obtained. 3D MAXIMAL INTENSITY PROJECTION reconstructed imaging of the cranial vasculature  in both the coronal and sagittal plane was performed. CTA perfusion imaging was also obtained. Reconstructions were created with color  coding of axial time to peak, axial blood volume, axial blood flow, axial mean  transit time and axial T Max. The study was analyzed by the [address] Urias. Algorithm    FINDINGS:  Atherosclerotic changes the origins of the ICA on the right on the left. The  left vertebral artery is dominant. Vertebral artery origins are patent. .  No  large thyroid lesion. No acute cervical process. No evidence of acute  intracranial hemorrhage. . The paranasal sinuses are clear. CTA NECK:  There is conventional three vessel arch anatomy. The origins of the innominate,  left common carotid and left subclavian arteries are normal. Mild  atherosclerotic change in stenosis of the mid cervical internal carotid arteries  on the right and on the left. Tortuosity of the distal cervical internal carotid  arteries. There is  0%stenosis in the right internal carotid artery utilizing NASCET  criteria. There is  0%stenosis in the right internal carotid artery utilizing NASCET  criteria. CTA HEAD:  Vertebral artery terminates in PICA.  The left vertebral artery is patent. Mild  atherosclerotic change of the V4 segment of the left vertebral artery. Cavernous  ICAs demonstrate atherosclerotic change but no occlusion. A1 vessels are patent. M1 vessels are patent. A2 and A3 vessels are patent. . The internal carotid,  anterior cerebral, and middle cerebral arteries are patent. There is no  flow-limiting intracranial stenosis. There is no aneurysm. There are no sizable  posterior communicating arteries. CT PERFUSION:    The perfusion abnormality is demonstrated in the posterior left frontal  lobe/left parietal lobe with area of complete ischemia suggested in the  posterior left frontal lobe. . No evidence of reversible ischemia. R CBF<30% :2 mL  Tmax >6s: 12 mL    Impression  There is no major vessel occlusion. There is no hemodynamically significant stenosis. There is no aneurysm or  dissection identified. Diminished perfusion in the posterior left frontal/anterior left parietal lobe. There is no concordant vessel occlusion identified. .  Stable chronic microvascular ischemic change and cerebral atrophy. .      CT CODE NEURO HEAD WO CONTRAST    Narrative  CLINICAL HISTORY: CODE S  INDICATION: CODE S  COMPARISON: 1/24/2019. CT dose reduction was achieved through use of a standardized protocol tailored  for this examination and automatic exposure control for dose modulation. TECHNIQUE: Serial axial images with a collimation of 5 mm were obtained from the  skull base through the vertex  FINDINGS:  There is sulcal and ventricular prominence. Confluent periventricular and  scattered foci of hypodensity in the cerebral white matter. There is no evidence  of an acute infarction, hemorrhage, or mass-effect. There is no evidence of  midline shift or hydrocephalus. Posterior fossa structures are unremarkable. No  extra-axial collections are seen. Mastoid air cells are well pneumatized and clear.   Vertebral vascular calcifications on the left remote right inferior cerebellar  infarct    Impression  No acute intracranial process. Remote right cerebellar infarct. Possible dense M2 segment on the left best appreciated on coronal imaging  discussed with Dr. Margarette Adams. Vertebral vascular calcifications. Imaging findings consistent with mild chronic microvascular ischemic change. There is a mild degree of cerebral atrophy. Assessment:     Hospital Problems  Date Reviewed: 12/14/2020        Codes Class Noted POA    Ischemic stroke [facility]) ICD-10-CM: I63.9  ICD-9-CM: 434.91  6/12/2021 Unknown            Plan:   1.) CVA              - s/p tPA in ED 21:56 6/12.               - CTH ordered for 24 post tPA to rule out hemorrhage.              - No ASA/anticoagulants until 24 hours post tPA and imaging is negative for hemorrhage              - NIHSS of 5 on initial on exam, now 1              - q1 neuro checks              - A1C and lipid panel pending, LDL goal <70              - MRI brain ordered              - ECHO ordered              - PT/OT/SLP evals              - Stroke education              - SBP goal 140-180, allow permissive HTN in the setting of acute CVA              - Labetalol/Cardene PRN              - Home meds per hospitalist      Please time MRI brain for 24 hours post tPA. If MRI cannot be completed around tPA completion time, then pt will need CT head to rule out hemorrhage post tPA. If MRI is obtained, pt does not also need CT head. I have discussed the diagnosis and the intended plan as seen in the above orders with , Intensivist, the patient and the primary RN. Patient and his wife were updated on current plan of care and is in agreement. All questions were answered. Thank you for this consult and participating in the care of this patient.   Signed By: Candance Croft, NP     June 13, 2021      Staff Addendum:    I have reviewed the documentation provided by the nurse practitioner, discussed her findings, clinical impression, and the proposed management plans with regards to this encounter. I have personally evaluated the patient and verified the history and confirmed the physical findings. Below are my additional findings:    76year old male with a h/o CAD/MI, cardiomyopathy, embolic stroke previously maintained on ASA/Coumadin, HPL, medication non-compliance presenting with word finding difficulty, dysarthria as well as RUE numbness 6/12/21 improving after tPA. CTH/CTA reviewed showing distal L MCA occlusion not amenable to endovascular intervention per NIS, remote R cerebellar infarct, decreased perfusion L frontoparietal territory on CTP. On examination, PERRL, EOMI, VFF, tongue ML, face symmetric with intact sensation throughout, strength 5/5 UE/LE b/l, no ataxia. Suspect cardioembolic mechanism for recent distal L MCA occlusion. Will proceed with MRI Brain WO 24h post tPA to assess territory of infarction and exclude hemorrhage. Plan to resume ASA + NOAC (preferably once daily medication/Xarelto to assist with compliance) following review of imaging. Telemetry/TTE pending. SBP<140. Resume statin therapy, goal LDL<70. Stroke education. No anticipated skilled needs.      [facility],   06/13/21

## 2021-06-13 NOTE — PROGRESS NOTES
0730-Bedside and Verbal shift change report given to [address] (oncoming nurse) by James Sims (offgoing nurse).  Report included the following information SBAR, Kardex, ED Summary, Intake/Output, MAR, Recent Results and Cardiac Rhythm SR.

## 2021-06-13 NOTE — PROGRESS NOTES
Discussed MRI results with patient. MRI unremarkable per radiology report. Will obtain 24-hour head CT tonight around 10 PM to assess for hemorrhage and then if negative for hemorrhage will start antiplatelet/anticoagulation regimen. ECHO pending  Lipid panel shows .8, start 40 mg of Lipitor nightly    Plan discussed with Dr. Jess Burgess, RN, and patient.      Marjorie Harrison [facility]  Neurocritical care NP

## 2021-06-13 NOTE — ED TRIAGE NOTES
Pt arrives to department with a CC of having trouble getting the correct words out. Pt and wife noticed symptoms when he was outside on the porch. Code S level 1 called. LKW 2045.

## 2021-06-13 NOTE — PROGRESS NOTES
Occupational Therapy 0827 -  [phone]     Orders acknowledged and chart reviewed. Patient given tPA @ 2156 on 6/12. Will hold OT evaluation until 24 hours post tPA administration.      Dakota Foster MS, OTR/L

## 2021-06-13 NOTE — PROGRESS NOTES
PT Note:    Orders received and acknowledged. Chart reviewed. Patient given tPA @ 2156 on 6/12. Will hold PT evaluation until 24 hours post tPA administration. Thank you.

## 2021-06-13 NOTE — PROGRESS NOTES
TRANSITIONS OF CARE PLAN:   1. RUR: 14%  2. DESTINATION: TBD - potentially own home  3. TRANSPORT: Likely spouse  4. NEEDS FOR DISCHARGE: TBD  5. ANTICIPATED FOLLOW UPS: Neuro  6. ONGOING INPATIENT NEEDS: PT/OT/SLP evals, Echo, Head CT, Brain MRI, ongoing monitoring post TPA administration     Anticipated Discharge is: Greater Than 48 Hours    Reason for Admission:  Ischemic Stroke             RUR Score:        14%             Plan for utilizing home health:    TBD      PCP: First and Last name:  None  Does not have a PCP and declined scheduling assist for now   Name of Practice:    Are you a current patient: Yes/No:    Approximate date of last visit:    Can you participate in a virtual visit with your PCP:                     Current Advanced Directive/Advance Care Plan: Full Code    Healthcare Decision Maker:   Click here to complete [address] including selection of the Healthcare Decision Maker Relationship (ie \"Primary\")           Spouse - Rhina Leisure: [phone]              Transition of Care Plan:                    CM contacted patient's spouse Matthew Streeterpper: [phone]). Per spouse, patient lives with spouse and mother in law in a 2 story home with basement, 5 exterior steps, 2nd floor bedroom, 16 interior steps with chair lift access. Patient is independent in ADLs, to include driving. 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 Home DME includes: BP Cuff, scale. Patient has no hx of HH or Rehab. Pharmacy preference is [address],[place] [address].  Additional support is limited in the [facility] area; patient has 2 children: 1 is in [place] and 1 is in [facility]. Spouse declined PCP scheduling assist at the present time. Spouse is aware of financial screening for uninsured status. Potential disposition is for discharge to own home with transport via spouse, dependent on progression. Care Management Interventions  PCP Verified by CM:  Yes (declined scheduling assist)  Palliative Care Criteria Met (RRAT>21 & CHF Dx)?: No  Mode of Transport at Discharge:  Other (see comment) (likely spouse)  Transition of Care Consult (CM Consult): Discharge Planning  MyChart Signup: No  Discharge Durable Medical Equipment: No (none)  Health Maintenance Reviewed: Yes (cm spoke with patient's spouse by phone)  Physical Therapy Consult: Yes  Occupational Therapy Consult: Yes  Speech Therapy Consult: No  Current Support Network: Own Home, Lives with Spouse  Confirm Follow Up Transport: Self (independent in adls, to include driving)  [employer] Provided?: No  Discharge Location  Discharge Placement: Unable to determine at this time (lives with spouse and mother in law in a 2 story home with basement, 5 exterior steps, 16 interior steps, 2nd floor bedroom)  CRM: Alexandru Salmon, MPH, [address] [place] [address]; Z: [phone]

## 2021-06-13 NOTE — PROGRESS NOTES
Neurocritical Care Code Stroke Documentation    Symptoms:   Aphasia, right hand numbness, right leg neglect   Last Known Well: 8:45pm   Medical hx: CVA, CAD   Anticoagulation: None   VAN: Negative   NIHSS:   1a-LOC:0    1b-Month/Age:1    1c-Open/Close Hand:0    2-Best Gaze:0    3-Visual Fields:0    4-Facial Palsy:0    5a-Left Arm:0    5b-Right Arm:0    6a-Left Le    6b-Right Le    7-Limb Ataxia:0    8-Sensory:1 (right hand)    9-Best Language:2    10-Dysarthria:0    11-Extinction/Inattention:1 (right leg)  TOTAL SCORE:5   Imaging:   CT-No acute intracranial process. Remote right cerebellar infarct. Possible dense M2 segment. Vertebral vascular calcifications. CTA- No major vessel occlusion, No aneurysm or dissection, M4 left posterior division (too distal for EVT per Dr. Theodore Hickman)    CTP- perfusion abnormality in the posterior left frontal lobe and left parietal lobe with area of complete ischemic in the posterior left frontal lobe. No evidence of reversible ischemia. Plan:   TPA Candidate: YES    Mechanical thrombectomy Candidate: NO  Received tPA and admission to ICU for further management and stroke workup. Discussed with: Dr. Theodore Hickman, Dr. Alessia Martin    Time spent: 60 minutes.      Rajan Bello NP  Neurocritical Care Nurse Practitioner  [phone]

## 2021-06-13 NOTE — H&P
[facility]    ICU TEAM H&P    Name: Mabel Flowers   : 1955   MRN: [id number]   Date: 2021      Assessment:     ICU Problems:    1. Acute stroke with right cerebellar infarct  2. S/P TPA administration      Imaging:  CT Results  (Last 48 hours)               21 1998  CTA CODE NEURO HEAD AND NECK W CONT Final result    Impression:  There is no major vessel occlusion. There is no hemodynamically significant stenosis. There is no aneurysm or   dissection identified. Diminished perfusion in the posterior left frontal/anterior left parietal lobe. There is no concordant vessel occlusion identified. .   Stable chronic microvascular ischemic change and cerebral atrophy. .                Narrative:  Clinical history: Code Neuro   INDICATION:   Code Neuro       COMPARISON:  003, 2019   CONTRAST: 100ml Isovue 370   TECHNIQUE:     CT dose reduction was achieved through use of a standardized protocol tailored   for this examination and automatic exposure control for dose modulation. Following the uneventful administration of Isovue-370 contrast material, axial   CT angiography of the head and neck was performed. Coronal and sagittal   reconstructions were obtained. 3D MAXIMAL INTENSITY PROJECTION reconstructed imaging of the cranial vasculature   in both the coronal and sagittal plane was performed. CTA perfusion imaging was also obtained. Reconstructions were created with color   coding of axial time to peak, axial blood volume, axial blood flow, axial mean   transit time and axial T Max. The study was analyzed by the Nely W Kelley Urias. Algorithm       FINDINGS:   Atherosclerotic changes the origins of the ICA on the right on the left. The   left vertebral artery is dominant. Vertebral artery origins are patent. .  No   large thyroid lesion. No acute cervical process. No evidence of acute   intracranial hemorrhage. . The paranasal sinuses are clear.        CTA NECK:    There is conventional three vessel arch anatomy. The origins of the innominate,   left common carotid and left subclavian arteries are normal. Mild   atherosclerotic change in stenosis of the mid cervical internal carotid arteries   on the right and on the left. Tortuosity of the distal cervical internal carotid   arteries. There is  0%stenosis in the right internal carotid artery utilizing NASCET   criteria. There is  0%stenosis in the right internal carotid artery utilizing NASCET   criteria. CTA HEAD:   Vertebral artery terminates in PICA. The left vertebral artery is patent. Mild   atherosclerotic change of the V4 segment of the left vertebral artery. Cavernous   ICAs demonstrate atherosclerotic change but no occlusion. A1 vessels are patent. M1 vessels are patent. A2 and A3 vessels are patent. . The internal carotid,   anterior cerebral, and middle cerebral arteries are patent. There is no   flow-limiting intracranial stenosis. There is no aneurysm. There are no sizable   posterior communicating arteries. CT PERFUSION:       The perfusion abnormality is demonstrated in the posterior left frontal   lobe/left parietal lobe with area of complete ischemia suggested in the   posterior left frontal lobe. . No evidence of reversible ischemia. R CBF<30% :2 mL   Tmax >6s: 12 mL           06/12/21 2210  CT CODE NEURO PERF W CBF Final result    Impression:  There is no major vessel occlusion. There is no hemodynamically significant stenosis. There is no aneurysm or   dissection identified. Diminished perfusion in the posterior left frontal/anterior left parietal lobe. There is no concordant vessel occlusion identified. .   Stable chronic microvascular ischemic change and cerebral atrophy. .                Narrative:  Clinical history: Code Neuro   INDICATION:   Code Neuro       COMPARISON:  227, 1/23/2019   CONTRAST: 100ml Isovue 370   TECHNIQUE:     CT dose reduction was achieved through use of a standardized protocol tailored   for this examination and automatic exposure control for dose modulation. Following the uneventful administration of Isovue-370 contrast material, axial   CT angiography of the head and neck was performed. Coronal and sagittal   reconstructions were obtained. 3D MAXIMAL INTENSITY PROJECTION reconstructed imaging of the cranial vasculature   in both the coronal and sagittal plane was performed. CTA perfusion imaging was also obtained. Reconstructions were created with color   coding of axial time to peak, axial blood volume, axial blood flow, axial mean   transit time and axial T Max. The study was analyzed by the Nely W Kelley Urias. Algorithm       FINDINGS:   Atherosclerotic changes the origins of the ICA on the right on the left. The   left vertebral artery is dominant. Vertebral artery origins are patent. .  No   large thyroid lesion. No acute cervical process. No evidence of acute   intracranial hemorrhage. . The paranasal sinuses are clear. CTA NECK:    There is conventional three vessel arch anatomy. The origins of the innominate,   left common carotid and left subclavian arteries are normal. Mild   atherosclerotic change in stenosis of the mid cervical internal carotid arteries   on the right and on the left. Tortuosity of the distal cervical internal carotid   arteries. There is  0%stenosis in the right internal carotid artery utilizing NASCET   criteria. There is  0%stenosis in the right internal carotid artery utilizing NASCET   criteria. CTA HEAD:   Vertebral artery terminates in PICA. The left vertebral artery is patent. Mild   atherosclerotic change of the V4 segment of the left vertebral artery. Cavernous   ICAs demonstrate atherosclerotic change but no occlusion. A1 vessels are patent. M1 vessels are patent. A2 and A3 vessels are patent. . The internal carotid,   anterior cerebral, and middle cerebral arteries are patent.  There is no   flow-limiting intracranial stenosis. There is no aneurysm. There are no sizable   posterior communicating arteries. CT PERFUSION:       The perfusion abnormality is demonstrated in the posterior left frontal   lobe/left parietal lobe with area of complete ischemia suggested in the   posterior left frontal lobe. . No evidence of reversible ischemia. R CBF<30% :2 mL   Tmax >6s: 12 mL           06/12/21 2130  CT CODE NEURO HEAD WO CONTRAST Final result    Impression:  No acute intracranial process. Remote right cerebellar infarct. Possible dense M2 segment on the left best appreciated on coronal imaging   discussed with Dr. Renuka Berman. Vertebral vascular calcifications. Imaging findings consistent with mild chronic microvascular ischemic change. There is a mild degree of cerebral atrophy. Narrative:  CLINICAL HISTORY: CODE S   INDICATION: CODE S   COMPARISON: 1/24/2019. CT dose reduction was achieved through use of a standardized protocol tailored   for this examination and automatic exposure control for dose modulation. TECHNIQUE: Serial axial images with a collimation of 5 mm were obtained from the   skull base through the vertex     FINDINGS:    There is sulcal and ventricular prominence. Confluent periventricular and   scattered foci of hypodensity in the cerebral white matter. There is no evidence   of an acute infarction, hemorrhage, or mass-effect. There is no evidence of   midline shift or hydrocephalus. Posterior fossa structures are unremarkable. No   extra-axial collections are seen. Mastoid air cells are well pneumatized and clear. Vertebral vascular calcifications on the left remote right inferior cerebellar   infarct                   ICU Comprehensive Plan of Care:     Plans for this Shift:     1. Admit to ICU  2. Monitor for at least 8 hours post TPA administration  3. Post TPA protocol: Started at 2156 on 6/12/2021  4. Hourly neurological checks  5.  STAT head CT with any neurological changes  6. Neurology consulted and following  7. Echocardiogram  8. SBP Goal of: 140-180  9. MAP Goal of: 65-85 mmHg  10. Nicardipine (Cardene) - For above SBP/MAP goals  11. IVFs:   12. Transfusion Trigger (Hgb): <7 g/dL  13. Respiratory Goals:  a. Incentive spirometry  14. Pulmonary toilet: Incentive Spirometry   15. SpO2 Goal: > 92%  16. Keep K>4; Mg>2   17. PT/OT: PT consulted and on board, OT consulted and on board and Speech therapy consulted and on board   18. Goals of Care Discussion with family Yes   23. Plan of Care/Code Status: Full Code  20. Appreciate Consultants Input   21. Discussed Care Plan with Bedside RN  22. Documentation of Current Medications  23. Rest of Plan Below:    F - Feeding:  Pending   A - Analgesia: None  S - Sedation: None  T - DVT Prophylaxis: SCD's or Sequential Compression Device   H - Head of Bed: > 30 Degrees  U - Ulcer Prophylaxis: Not at this time   G - Glycemic Control: Insulin  S - Spontaneous Breathing Trial: N/A  B - Bowel Regimen: MiraLax  I - Indwelling Catheter:   Tubes: None  Lines: Peripheral IV  Drains: Martinez Catheter  D - De-escalation of Antibiotics: None    Subjective:   Progress Note: 6/13/2021      Reason for ICU Admission: Acute Stroke     HPI: This is a 76year old male patient with known past medical history of hyperlipidemia, hypertension, CVA, CAD, MI, who presented to the ED with complaints of right hand numbness. While in the ED he also developed some expressive aphasia. CT showed possible right cerebellar infarct. Patient was seen and deemed a candidate for TPA. After TPA he was transferred to the ICU. Now in the ICU he has resolution of symptoms. Speech is clear. He still has minimal tingling in his right hand. He states he feels much better. He denies headache or blurred vision. He is in good spirits.        POD:  * No surgery found *    S/P:       Active Problem List:     Problem List  Date Reviewed: 12/14/2020        Codes Class    Ischemic stroke [facility]) ICD-10-CM: I63.9  ICD-9-CM: 434.91         Impending cerebrovascular accident [facility]) ICD-10-CM: I63.9  ICD-9-CM: 435.9         Long term (current) use of anticoagulants ICD-10-CM: Z79.01  ICD-9-CM: V58.61         Obesity, morbid ([facility] ICD-10-CM: E66.01  ICD-9-CM: 278.01         CVA (cerebral vascular accident) ([facility] ICD-10-CM: I63.9  ICD-9-CM: 434.91         Hyperlipidemia ICD-10-CM: E78.5  ICD-9-CM: 272.4         Coronary atherosclerosis of native coronary artery ICD-10-CM: I25.10  ICD-9-CM: 414.01         Acute anterior wall MI ([facility] ICD-10-CM: I21.09  ICD-9-CM: 410.10     Overview Addendum 4/24/2012  3:40 PM by Grace Meraz MD     4/12 100% mid lad treated with single suly. 60% om1, 80% prox rca. Past Medical History:      has a past medical history of CAD (coronary artery disease), Received intravenous tissue plasminogen activator (tPA) in emergency department, and Stroke (cerebrum) ([facility] Past Surgical History:      has a past surgical history that includes pr cardiac surg procedure unlist.    Home Medications:     Prior to Admission medications    Medication Sig Start Date End Date Taking? Authorizing Provider   warfarin (COUMADIN) 5 mg tablet TAKE 1 TABLET ON WEDNESDAY AND TAKE 2 TABS ALL OTHER DAYS 11/23/20   Susan Adorno III, MD   atorvastatin (LIPITOR) 40 mg tablet Take 1 Tab by mouth nightly. 10/23/20   Susan Adorno III, MD   lisinopriL (PRINIVIL, ZESTRIL) 20 mg tablet TAKE 1 TABLET BY MOUTH ONE TIME DAILY 8/14/20   Susan Adorno III, MD   spironolactone (ALDACTONE) 25 mg tablet TAKE 1 TABLET BY MOUTH ONE TIME DAILY 5/8/20   Susan Adorno III, MD   carvedilol (COREG) 6.25 mg tablet Take 1 Tab by mouth two (2) times daily (with meals). 9/27/19   Grace Meraz MD   aspirin 81 mg chewable tablet Take 1 Tab by mouth daily.  8/15/19   Grace Meraz MD       Allergies/Social/Family History:     No Known Allergies   Social History     Tobacco Use    Smoking status: Never Smoker    Smokeless tobacco: Never Used   Substance Use Topics    Alcohol use: Yes     Comment: Weekly      No family history on file. Review of Systems:     Pertinent items are noted in HPI. Objective:   Vital Signs:  Visit Vitals  /76   Pulse 75   Temp 97.9 °F (36.6 °C)   Resp 21   Ht 6' 1\" (1.854 m)   Wt 142.5 kg (314 lb 2.5 oz)   SpO2 93%   BMI 41.45 kg/m²      O2 Device: None (Room air) Temp (24hrs), Av.9 °F (36.6 °C), Min:97.9 °F (36.6 °C), Max:97.9 °F (36.6 °C)           Intake/Output:     Intake/Output Summary (Last 24 hours) at 2021 0045  Last data filed at 2021 0000  Gross per 24 hour   Intake 19.17 ml   Output 400 ml   Net -380.83 ml       Physical Exam:    General:  Alert, cooperative, well noursished, well developed, appears stated age   Eyes:  Sclera anicteric. Pupils equally round and reactive to light. Mouth/Throat: Mucous membranes normal, oral pharynx clear   Neck: Supple   Lungs:   Clear to auscultation bilaterally, good effort   CV:  Regular rate and rhythm,no murmur, click, rub or gallop   Abdomen:   Soft, non-tender. bowel sounds normal. non-distended   Extremities: No cyanosis or edema   Skin: Skin color, texture, turgor normal. no acute rash or lesions   Lymph nodes: Cervical and supraclavicular normal   Musculoskeletal: No swelling or deformity   Lines/Devices:  Intact, no erythema, drainage or tenderness   Psych: Alert and oriented, normal mood affect given the setting       LABS AND  DATA: Personally reviewed  Recent Labs     21   WBC 6.7   HGB 15.1   HCT 45.3        Recent Labs     21      K 4.0   *   CO2 23   BUN 18   CREA 1.19   *   CA 8.8     Recent Labs     21   AP 64   TP 7.5   ALB 3.7   GLOB 3.8     Recent Labs     21   INR 1.0      No results for input(s): PHI, PCO2I, PO2I, FIO2I in the last 72 hours.   Recent Labs     21  9475   TROIQ <0.05 Hemodynamics:   PAP:   CO:     Wedge:   CI:     CVP:    SVR:       PVR:       Ventilator Settings:  Mode Rate Tidal Volume Pressure FiO2 PEEP                    Peak airway pressure:      Minute ventilation:          MEDS: Reviewed    Chest X-Ray:  CXR Results  (Last 48 hours)    None            ECHO:  Pending    Multidisciplinary Rounds Completed:  Pending    ABCDEF Bundle/Checklist Completed:  Yes    SPECIAL EQUIPMENT  None    DISPOSITION  Stay in ICU    CRITICAL CARE CONSULTANT NOTE  I had a face to face encounter with the patient, reviewed and interpreted patient data including clinical events, labs, images, vital signs, I/O's, and examined patient. I have discussed the case and the plan and management of the patient's care with the consulting services, the bedside nurses and the respiratory therapist.      NOTE OF PERSONAL INVOLVEMENT IN CARE   This patient has a high probability of imminent, clinically significant deterioration, which requires the highest level of preparedness to intervene urgently. I participated in the decision-making and personally managed or directed the management of the following life and organ supporting interventions that required my frequent assessment to treat or prevent imminent deterioration. I personally spent 60 minutes of critical care time. This is time spent at this critically ill patient's bedside actively involved in patient care as well as the coordination of care and discussions with the patient's family. This does not include any procedural time which has been billed separately.       Enio Werner [facility]     Critical Care Medicine  [facility]

## 2021-06-14 ENCOUNTER — APPOINTMENT (OUTPATIENT)
Dept: NON INVASIVE DIAGNOSTICS | Age: 66
DRG: 062 | End: 2021-06-14
Attending: NURSE PRACTITIONER

## 2021-06-14 VITALS
DIASTOLIC BLOOD PRESSURE: 86 MMHG | RESPIRATION RATE: 19 BRPM | OXYGEN SATURATION: 94 % | SYSTOLIC BLOOD PRESSURE: 125 MMHG | BODY MASS INDEX: 42.53 KG/M2 | TEMPERATURE: 97.8 F | WEIGHT: 314 LBS | HEIGHT: 72 IN | HEART RATE: 65 BPM

## 2021-06-14 LAB
ANION GAP SERPL CALC-SCNC: 7 MMOL/L (ref 5–15)
BUN SERPL-MCNC: 16 MG/DL (ref 6–20)
BUN/CREAT SERPL: 16 (ref 12–20)
CALCIUM SERPL-MCNC: 8.7 MG/DL (ref 8.5–10.1)
CHLORIDE SERPL-SCNC: 107 MMOL/L (ref 97–108)
CO2 SERPL-SCNC: 25 MMOL/L (ref 21–32)
CREAT SERPL-MCNC: 0.99 MG/DL (ref 0.7–1.3)
ECHO AO ROOT DIAM: 3.3 CM
ECHO AV AREA PEAK VELOCITY: 1.32 CM2
ECHO AV AREA/BSA PEAK VELOCITY: 0.5 CM2/M2
ECHO AV PEAK GRADIENT: 15.92 MMHG
ECHO AV PEAK VELOCITY: 199.49 CM/S
ECHO LA AREA 4C: 26.04 CM2
ECHO LA MAJOR AXIS: 4.46 CM
ECHO LA MINOR AXIS: 1.73 CM
ECHO LA VOL 2C: 82.01 ML (ref 18–58)
ECHO LA VOL 4C: 83.27 ML (ref 18–58)
ECHO LA VOL BP: 85.37 ML (ref 18–58)
ECHO LA VOL/BSA BIPLANE: 33.09 ML/M2 (ref 16–28)
ECHO LA VOLUME INDEX A2C: 31.79 ML/M2 (ref 16–28)
ECHO LA VOLUME INDEX A4C: 32.28 ML/M2 (ref 16–28)
ECHO LV INTERNAL DIMENSION DIASTOLIC: 6.67 CM (ref 4.2–5.9)
ECHO LV INTERNAL DIMENSION SYSTOLIC: 5.63 CM
ECHO LV IVSD: 1.03 CM (ref 0.6–1)
ECHO LV MASS 2D: 316.8 G (ref 88–224)
ECHO LV MASS INDEX 2D: 122.8 G/M2 (ref 49–115)
ECHO LV POSTERIOR WALL DIASTOLIC: 1.08 CM (ref 0.6–1)
ECHO LVOT DIAM: 2.17 CM
ECHO LVOT PEAK GRADIENT: 2.04 MMHG
ECHO LVOT PEAK VELOCITY: 71.47 CM/S
ECHO MV A VELOCITY: 81.64 CM/S
ECHO MV AREA PHT: 2.25 CM2
ECHO MV E DECELERATION TIME (DT): 337.9 MS
ECHO MV E VELOCITY: 35.83 CM/S
ECHO MV E/A RATIO: 0.44
ECHO MV PRESSURE HALF TIME (PHT): 97.99 MS
ECHO PV PEAK INSTANTANEOUS GRADIENT SYSTOLIC: 2.6 MMHG
ECHO TV REGURGITANT MAX VELOCITY: 281.59 CM/S
ECHO TV REGURGITANT PEAK GRADIENT: 31.72 MMHG
ERYTHROCYTE [DISTWIDTH] IN BLOOD BY AUTOMATED COUNT: 12.6 % (ref 11.5–14.5)
GLUCOSE SERPL-MCNC: 97 MG/DL (ref 65–100)
HCT VFR BLD AUTO: 45.8 % (ref 36.6–50.3)
HGB BLD-MCNC: 14.7 G/DL (ref 12.1–17)
MCH RBC QN AUTO: 32.2 PG (ref 26–34)
MCHC RBC AUTO-ENTMCNC: 32.1 G/DL (ref 30–36.5)
MCV RBC AUTO: 100.2 FL (ref 80–99)
NRBC # BLD: 0 K/UL (ref 0–0.01)
NRBC BLD-RTO: 0 PER 100 WBC
PLATELET # BLD AUTO: 166 K/UL (ref 150–400)
PMV BLD AUTO: 10.1 FL (ref 8.9–12.9)
POTASSIUM SERPL-SCNC: 4 MMOL/L (ref 3.5–5.1)
RBC # BLD AUTO: 4.57 M/UL (ref 4.1–5.7)
SODIUM SERPL-SCNC: 139 MMOL/L (ref 136–145)
WBC # BLD AUTO: 6.3 K/UL (ref 4.1–11.1)

## 2021-06-14 PROCEDURE — 74011250637 HC RX REV CODE- 250/637: Performed by: PHYSICIAN ASSISTANT

## 2021-06-14 PROCEDURE — 99223 1ST HOSP IP/OBS HIGH 75: CPT | Performed by: INTERNAL MEDICINE

## 2021-06-14 PROCEDURE — 99232 SBSQ HOSP IP/OBS MODERATE 35: CPT | Performed by: PSYCHIATRY & NEUROLOGY

## 2021-06-14 PROCEDURE — 80048 BASIC METABOLIC PNL TOTAL CA: CPT

## 2021-06-14 PROCEDURE — 97165 OT EVAL LOW COMPLEX 30 MIN: CPT | Performed by: OCCUPATIONAL THERAPIST

## 2021-06-14 PROCEDURE — 97161 PT EVAL LOW COMPLEX 20 MIN: CPT

## 2021-06-14 PROCEDURE — 97116 GAIT TRAINING THERAPY: CPT

## 2021-06-14 PROCEDURE — 85027 COMPLETE CBC AUTOMATED: CPT

## 2021-06-14 PROCEDURE — 74011250637 HC RX REV CODE- 250/637: Performed by: NURSE PRACTITIONER

## 2021-06-14 PROCEDURE — 36415 COLL VENOUS BLD VENIPUNCTURE: CPT

## 2021-06-14 PROCEDURE — 93306 TTE W/DOPPLER COMPLETE: CPT | Performed by: SPECIALIST

## 2021-06-14 PROCEDURE — 93306 TTE W/DOPPLER COMPLETE: CPT

## 2021-06-14 PROCEDURE — 92610 EVALUATE SWALLOWING FUNCTION: CPT

## 2021-06-14 RX ORDER — LISINOPRIL 10 MG/1
10 TABLET ORAL DAILY
Status: DISCONTINUED | OUTPATIENT
Start: 2021-06-14 | End: 2021-06-14 | Stop reason: HOSPADM

## 2021-06-14 RX ORDER — LISINOPRIL 10 MG/1
10 TABLET ORAL DAILY
Qty: 30 TABLET | Refills: 0 | Status: SHIPPED | OUTPATIENT
Start: 2021-06-15 | End: 2021-06-24 | Stop reason: SDUPTHER

## 2021-06-14 RX ORDER — GUAIFENESIN 100 MG/5ML
81 LIQUID (ML) ORAL DAILY
Status: DISCONTINUED | OUTPATIENT
Start: 2021-06-14 | End: 2021-06-14 | Stop reason: HOSPADM

## 2021-06-14 RX ORDER — CARVEDILOL 3.12 MG/1
3.12 TABLET ORAL 2 TIMES DAILY
Qty: 30 TABLET | Refills: 0 | Status: SHIPPED | OUTPATIENT
Start: 2021-06-14 | End: 2021-06-24 | Stop reason: SDUPTHER

## 2021-06-14 RX ORDER — CARVEDILOL 3.12 MG/1
3.12 TABLET ORAL 2 TIMES DAILY
Status: DISCONTINUED | OUTPATIENT
Start: 2021-06-14 | End: 2021-06-14 | Stop reason: HOSPADM

## 2021-06-14 RX ADMIN — ASPIRIN 81 MG: 81 TABLET, CHEWABLE ORAL at 08:59

## 2021-06-14 RX ADMIN — LISINOPRIL 10 MG: 10 TABLET ORAL at 16:11

## 2021-06-14 RX ADMIN — CARVEDILOL 3.12 MG: 3.12 TABLET, FILM COATED ORAL at 17:07

## 2021-06-14 RX ADMIN — RIVAROXABAN 20 MG: 20 TABLET, FILM COATED ORAL at 08:59

## 2021-06-14 RX ADMIN — Medication 10 ML: at 06:44

## 2021-06-14 NOTE — PROGRESS NOTES
PHYSICAL THERAPY EVALUATION WITH DISCHARGE  Patient: Mabel Flowers (16 y.o. male)  Date: 6/14/2021  Primary Diagnosis: Ischemic stroke ([address] [I63.9]       Precautions:    (SBP <140)      ASSESSMENT  Based on the objective data described below, the patient presents with some elevated BPs, but otherwise approaching functional baseline s/p R cerebellar stroke. Speech and R hand sensory deficits have all resolved. Patient is standing and ambulating in his room independently. Ambulation out in the hallway appears to be at baseline, though distance limited secondary to /78. Nursing aware. Patient is independent and working at baseline. Reviewed BEFAST signs and symptoms of stroke. No further PT needs identified at this time. Please re-consult should functional status change. Functional Outcome Measure: The patient scored Total: 54/56 on the Harley Balance Assessment which is indicative of low fall risk. Other factors to consider for discharge: good family support, had a stroke in the past     Further skilled acute physical therapy is not indicated at this time. PLAN :  Recommendation for discharge: (in order for the patient to meet his/her long term goals)  No skilled physical therapy/ follow up rehabilitation needs identified at this time. This discharge recommendation:  Has not yet been discussed the attending provider and/or case management    IF patient discharges home will need the following DME: none         SUBJECTIVE:   Patient stated I am just bored. Ready to get home. Maulik Jules    OBJECTIVE DATA SUMMARY:   HISTORY:    Past Medical History:   Diagnosis Date    CAD (coronary artery disease)     stent, MI 4-10-12    Received intravenous tissue plasminogen activator (tPA) in emergency department     Stroke (cerebrum) [facility])      Past Surgical History:   Procedure Laterality Date    SD CARDIAC SURG PROCEDURE UNLIST      cath with stents       Prior level of function: independent, driving, and working  Personal factors and/or comorbidities impacting plan of care: CAD    Home Situation  Home Environment: Private residence  # Steps to Enter: 5  Rails to Enter: Yes  Hand Rails : Bilateral  One/Two Story Residence: Other (Comment) (3 levels)  Living Alone: No  Support Systems: Family member(s), Spouse/Significant Other/Partner (mother in law )  Patient Expects to be Discharged to[de-identified] House  Current DME Used/Available at Home: None    EXAMINATION/PRESENTATION/DECISION MAKING:   Critical Behavior:  Neurologic State: Alert  Orientation Level: Oriented X4  Cognition: Follows commands, Appropriate for age attention/concentration  Safety/Judgement: Awareness of environment    Hearing: Auditory  Auditory Impairment: None    Strength:    Strength:  Within functional limits, equal bilaterally    Tone & Sensation:   Tone: Normal  Sensation: Intact    Coordination:  Coordination: Within functional limits, heel to shin test and UE ERICA test all normal     Vision:    Good acuity with glasses, tracking well bilaterally  Functional Mobility:  Bed Mobility:  Supine to Sit: Independent  Sit to Supine: Independent     Transfers:  Sit to Stand: Supervision  Stand to Sit: Supervision     Balance:   Sitting: Intact  Standing: Intact     Ambulation/Gait Training:  Distance (ft): 180 Feet (ft) (limited only by BP)  Assistive Device: Gait belt  Ambulation - Level of Assistance: Supervision  Gait Abnormalities: Trunk sway increased  Base of Support: Widened    Functional Measure  Harley Balance Test:    Sitting to Standing: 3  Standing Unsupported: 4  Sitting with Back Unsupported: 4  Standing to Sittin  Transfers: 4  Standing Unsupported with Eyes Closed: 4  Standing Unsupported with Feet Together: 4  Reach Forward with Outstretched Arm: 4   Object: 4  Turn to Look Over Shoulders: 4  Turn 360 Degrees: 4  Alternate Foot on Step/Stool: 4  Standing Unsupported One Foot in Front: 4  Stand on One Leg: 3  Total: 54/56 56=Maximum possible score;   0-20=High fall risk  21-40=Moderate fall risk   41-56=Low fall risk        Physical Therapy Evaluation Charge Determination   History Examination Presentation Decision-Making   MEDIUM  Complexity : 1-2 comorbidities / personal factors will impact the outcome/ POC  LOW Complexity : 1-2 Standardized tests and measures addressing body structure, function, activity limitation and / or participation in recreation  LOW Complexity : Stable, uncomplicated  LOW Complexity : FOTO score of       Based on the above components, the patient evaluation is determined to be of the following complexity level: LOW     Pain Rating:  No pain reported during assessment     Activity Tolerance:   Good, SpO2 stable on RA and BP up to 155/78 with mobility    After treatment patient left in no apparent distress:   Sitting in chair and Call bell within reach    COMMUNICATION/EDUCATION:   The patients plan of care was discussed with: Occupational therapist and Registered nurse. Patient was educated regarding His deficit(s) of resolved aphasia and RUE sensory deficits as this relates to His diagnosis of R cerebellar stroke. He demonstrated Good understanding. Patient and/or family was verbally educated on the BE FAST acronym for signs/symptoms of CVA and TIA. BE FAST was written on patient's communication board  for visual education and reinforcement. All questions answered with patient indicating good understanding. Fall prevention education was provided and the patient/caregiver indicated understanding., Patient/family have participated as able in goal setting and plan of care. and Patient/family agree to work toward stated goals and plan of care.     Thank you for this referral.  Fatuma Tian, PT, DPT  Geriatric Clinical Specialist    Time Calculation: 21 mins

## 2021-06-14 NOTE — DISCHARGE SUMMARY
Discharge Summary       PATIENT ID: Trina Buckley  MRN: [id number]   YOB: 1955    DATE OF ADMISSION: 6/12/2021  9:22 PM    DATE OF DISCHARGE: 06/14/2021   PRIMARY CARE PROVIDER: None     ATTENDING PHYSICIAN: Mamta Chirinos MD  DISCHARGING PROVIDER: Gene Galvan NP    To contact this individual call [phone] and ask the  to page. If unavailable ask to be transferred the Adult Hospitalist Department. CONSULTATIONS: IP CONSULT TO INTENSIVIST  IP CONSULT TO CARDIOLOGY    PROCEDURES/SURGERIES: * No surgery found *    ADMITTING [address] [place] [address] COURSE:   Mr Kayla Bullock is a 77 yo male with a PMH of HLD, HTN, prior CVA (non-compliant with asa/coumadin), CAD, MI who presented to the ED on 6/12 with R hand numbness and expressive aphasia. CT head did not show hemorrhage, notable for remote R cerebellar infarct, possible L dense M2 segment.  Teleneurology was consulted and TPA was given at 21:56 on 6/12.  NIHSS 5 prior to TPA.  CTA head/nect and CTP negative for major vessel occlusion/aneurysm/dissection.  M4 posterior occlusion too distal for EVT per NIS.  CTP notable for perfusion abnormality in the posterior L frontal lobe/L parietal lobe.  No evidence of reversible ischemia.  He was taken tot he ICU for further monitoring after TPA.  Symptoms improved after TPA.  NIHSS 1 after TPA, now 0.      06/14: Seen and examined patient sitting up in bed. AAOx3. MASON. States that he is feeling much better. All \"stroke symptoms\" have resolved. Wants to go home asap. No new complaints. Patient to transfer out of ICU.       06/14: Reassessed patient. Wants to go home. Discussed with cardiology, patient to follow up out patient with Dr Kirk Rob. At that time an echo will be repeated. Discussed discharge planning with patient. Reviewed instruction. Questions answered.      DISCHARGE DIAGNOSES / PLAN:      Ischemic CVA: Distal L MCA occlusion not amenable to endovascular intervention per NIS, remote R cerebellar infarct, decreased perfusion L frontoparietal territory on CTP. Post 24h CT head w/o hemorrhage. MRI 6/13 with acute ischemia L temporal lobe and posterior frontal parietal deep white matter  - Aspirin 81mg/day  - Xarleto 20mg/day  - Dietary modifications for A1C 6.2  - Echo completed- LVEF 20-25%     CAD   - Echo completed- LVEF 20-25%  - Spoke with Dr Berna Dimas, states he reviewed echo. Advised for patient to follow up with Dr. Reid Puga. Repeat echo will be done at that time. - Rx Coreg 3.125, Lisinopril 10mg- Discussed medication changes with patient.      HLD:   - Continue lipitor 40mg daily     HTN:  - Rx Coreg and Lisinopril      Morbid Obesity   - BMI 42.59  - Weight loss management to be followed up outpatient. ADDITIONAL CARE RECOMMENDATIONS:   Take medications as prescribed. It is very important that you follow up with your primary care provider, cardiology and neurology       PENDING TEST RESULTS:   At the time of discharge the following test results are still pending: None     FOLLOW UP APPOINTMENTS:    Follow-up Information     Follow up With Specialties Details Why Contact Info    Humza Hinkle MD Cardiology Schedule an appointment as soon as possible for a visit For follow up within 2-3 weeks  [address] [facility] [address] [place] [address],  Neurology Schedule an appointment as soon as possible for a visit For CVA followup in 4-6 weeks [place] [address]  [phone]      PCP   Schedule an appointment as soon as possible for a visit For follow up witin one week               DIET: Cardiac Diet      ACTIVITY: Activity as tolerated    WOUND CARE: None     EQUIPMENT needed: None       DISCHARGE MEDICATIONS:  Current Discharge Medication List      START taking these medications    Details   rivaroxaban (XARELTO) 20 mg tab tablet Take 1 Tablet by mouth daily.   Qty: 30 Tablet, Refills: 0  Start date: 6/15/2021         CONTINUE these medications which have CHANGED    Details   carvediloL (COREG) 3.125 mg tablet Take 1 Tablet by mouth two (2) times a day. Qty: 30 Tablet, Refills: 0  Start date: 6/14/2021      lisinopriL (PRINIVIL, ZESTRIL) 10 mg tablet Take 1 Tablet by mouth daily. Qty: 30 Tablet, Refills: 0  Start date: 6/15/2021         CONTINUE these medications which have NOT CHANGED    Details   atorvastatin (LIPITOR) 40 mg tablet Take 1 Tab by mouth nightly. Qty: 90 Tab, Refills: 3    Associated Diagnoses: Hypertension, unspecified type; Mixed hyperlipidemia; Atherosclerosis of native coronary artery of native heart without angina pectoris      aspirin 81 mg chewable tablet Take 1 Tab by mouth daily. Qty: 90 Tab, Refills: 1    Associated Diagnoses: Hypertension, unspecified type; Mixed hyperlipidemia; Atherosclerosis of native coronary artery of native heart without angina pectoris         STOP taking these medications       warfarin (COUMADIN) 5 mg tablet Comments:   Reason for Stopping:         spironolactone (ALDACTONE) 25 mg tablet Comments:   Reason for Stopping:                 NOTIFY YOUR PHYSICIAN FOR ANY OF THE FOLLOWING:   Fever over 101 degrees for 24 hours. Chest pain, shortness of breath, fever, chills, nausea, vomiting, diarrhea, change in mentation, falling, weakness, bleeding. Severe pain or pain not relieved by medications. Or, any other signs or symptoms that you may have questions about.     DISPOSITION:  X  Home With:   OT  PT  HH  RN       Long term SNF/Inpatient Rehab    Independent/assisted living    Hospice    Other:       PATIENT CONDITION AT DISCHARGE:     Functional status    Poor     Deconditioned    X Independent      Cognition    X Lucid     Forgetful     Dementia      Catheters/lines (plus indication)    Martinez     PICC     PEG    X None      Code status   X  Full code     DNR      PHYSICAL EXAMINATION AT DISCHARGE:  General:          Alert, cooperative, no distress, appears stated age. HEENT:           Atraumatic, anicteric sclerae, pink conjunctivae                          No oral ulcers, mucosa moist, throat clear, dentition fair  Neck:               Supple, symmetrical  Lungs:             Clear to auscultation bilaterally. No Wheezing or Rhonchi. No rales. Chest wall:      No tenderness  No Accessory muscle use. Heart:              Regular  rhythm,  No  murmur   No edema  Abdomen:        Soft, non-tender. Not distended. Bowel sounds normal  Extremities:     No cyanosis. No clubbing,                            Skin turgor normal, Capillary refill normal  Skin:                Not pale. Not Jaundiced  No rashes   Psych:             Not anxious or agitated. Neurologic:      Alert, moves all extremities, answers questions appropriately and responds to commands       CHRONIC MEDICAL DIAGNOSES:  Problem List as of 6/14/2021 Date Reviewed: 12/14/2020        Codes Class Noted - Resolved    Ischemic stroke [facility]) ICD-10-CM: I63.9  ICD-9-CM: 434.91  6/12/2021 - Present        Impending cerebrovascular accident [facility]) ICD-10-CM: I63.9  ICD-9-CM: 435.9  2/8/2019 - Present        Long term (current) use of anticoagulants ICD-10-CM: Z79.01  ICD-9-CM: V58.61  2/8/2019 - Present        Obesity, morbid ([facility] [address] ICD-10-CM: E66.01  ICD-9-CM: 278.01  2/4/2019 - Present        CVA (cerebral vascular accident) ([facility] [address] ICD-10-CM: I63.9  ICD-9-CM: 434.91  1/23/2019 - Present        Hyperlipidemia ICD-10-CM: E78.5  ICD-9-CM: 272.4  4/24/2012 - Present        Coronary atherosclerosis of native coronary artery ICD-10-CM: I25.10  ICD-9-CM: 414.01  4/24/2012 - Present        Acute anterior wall MI ([facility] [address] ICD-10-CM: I21.09  ICD-9-CM: 410.10  4/10/2012 - Present    Overview Addendum 4/24/2012  3:40 PM by Ethel Putnam MD     4/12 100% mid lad treated with single suly. 60% om1, 80% prox rca.                      Greater than 45 minutes were spent with the patient on counseling and coordination of care    Signed:   Anastasia Yang NP  6/14/2021  5:10 PM

## 2021-06-14 NOTE — PROGRESS NOTES
Neurology Progress Note  Tere Dwyer NP    Admit Date: 2021   LOS: 2 days      Daily Progress Note: 2021    HPI: Kenzie Archuleta is a 76 y.o. male with a past medical history of CAD and CVA on no medication. States he was supposed to be on Coumadin, lipitor and baby aspirin but he stopped last year because he does not like to take medications. He arrived to the ER via private vehicle on 21 with word finding difficulties. He stated he also had difficulty zipping his pants and had some right hand numbness. Code S was called and he wad taken for [facility] which showed no acute process but remote right cerebellar infarct. There was a possible left dense M2 segment . Teleneurology was consulted and tPA was recommended and started at 21:56. NIHSS was a 5 prior to tPA administration. CTA H/N and CTP were performed which showed no major vessel occlusion, no aneurysm or dissection. M4 posterior division occlusion too distal for EVT per Dr. Lina Romero. CTP showed perfusion abnormality in the posterior left frontal lobe/left parietal lobe. No evidence of reversible ischemia. Patient was taken to ICU for close monitoring. Subjective:   No overnight events. Ervin Swathi to go home. 24 hour CT head without hemorrhage. Started on ASA and Xarelto today. Denies numbness, tingling, chest pain, leg pain, nausea, vomiting, difficulty swallowing, headache, and dyspnea. No Known Allergies    Review of Systems:  Pertinent items are noted in the History of Present Illness. Objective:   Vital signs  Temp (24hrs), Av °F (36.7 °C), Min:97.7 °F (36.5 °C), Max:98.3 °F (36.8 °C)   No intake/output data recorded.  0701 -  1900  In: 284.2 [P.O.:240;  I.V.:44.2]  Out: 400 [Urine:400]  Visit Vitals  BP (!) 168/93 (BP 1 Location: Right upper arm, BP Patient Position: At rest)   Pulse (!) 56   Temp 98.3 °F (36.8 °C)   Resp 18   Ht 6' 1\" (1.854 m)   Wt 142.5 kg (314 lb 2.5 oz)   SpO2 96%   BMI 41.45 kg/m²      O2 Device: None (Room air)   Vitals:    21 2100 21 2200 21 2300 21 0000   BP: (!) 159/94 (!) 146/73 (!) 142/80 (!) 168/93   Pulse: 61 60 (!) 56 (!) 56   Resp: 18 15 12 18   Temp:    98.3 °F (36.8 °C)   SpO2: 95% 94% 95% 96%   Weight:       Height:            Physical Exam:  GENERAL: Calm, cooperative, NAD  SKIN: Warm, dry, color appropriate for ethnicity. Neurologic Exam:  Mental Status:  Alert and oriented x 4. Appropriate affect, mood and behavior. Language:    Normal fluency, repetition, comprehension and naming. Cranial Nerves:   Pupils 3 mm, equal, round and reactive to light. Visual fields full to confrontation. Extraocular movements intact. Facial sensation intact. Full facial strength, no asymmetry. Hearing grossly intact bilaterally. No dysarthria. Tongue protrudes to midline, palate elevates symmetrically. Shoulder shrug 5/5 bilaterally. Motor:    No pronator drift. Bulk and tone normal.      5/5 power in all extremities proximally and distally. No involuntary movements. Sensation:    Sensation intact throughout to light touch. Coordination & Gait: Gait deferred. FTN and HTS intact with no ataxia present.     NIHSS:      1a-LOC:0    1b-Month/Age:0    1c-Open/Close Hand:0    2-Best Gaze:0    3-Visual Fields:0    4-Facial Palsy:0    5a-Left Arm:0    5b-Right Arm:0    6a-Left Le    6b-Right Le    7-Limb Ataxia:0    8-Sensory:0    9-Best Language:0    10-Dysarthria:0    11-Extinction/Inattention:0  TOTAL SCORE:0    Labs:  Lab Results   Component Value Date/Time    WBC 5.0 2021 06:53 AM    Hemoglobin (POC) 16.0 04/10/2012 03:15 AM    HGB 11.4 (L) 2021 06:53 AM    Hematocrit (POC) 47 04/10/2012 03:15 AM    HCT 35.4 (L) 2021 06:53 AM    PLATELET 97 (L)  06:53 AM    .3 (H) 2021 06:53 AM     Lab Results   Component Value Date/Time    Sodium 138 2021 06:53 AM    Potassium 5.0 2021 06:53 AM    Chloride 107 06/13/2021 06:53 AM    CO2 25 06/13/2021 06:53 AM    Anion gap 6 06/13/2021 06:53 AM    Glucose 96 06/13/2021 06:53 AM    BUN 15 06/13/2021 06:53 AM    Creatinine 0.96 06/13/2021 06:53 AM    BUN/Creatinine ratio 16 06/13/2021 06:53 AM    GFR est AA >60 06/13/2021 06:53 AM    GFR est non-AA >60 06/13/2021 06:53 AM    Calcium 8.4 (L) 06/13/2021 06:53 AM     Imaging:  CT Results (maximum last 3): Results from [place] encounter on 06/12/21    CT HEAD WO CONT    Narrative  INDICATION: cva 24 hours s/p tPA    Exam: Noncontrast CT of the brain is performed with 5 mm collimation. CT dose reduction was achieved with the use of the standardized protocol  tailored for this examination and automatic exposure control for dose  modulation. Direct comparison is made to prior MR dated June 13, 2021. FINDINGS: Left frontotemporal acute infarct is unchanged. There is no evidence  of hemorrhagic transformation. There is no acute intracranial hemorrhage, mass,  mass effect or herniation. Ventricular system is normal. The visualized  paranasal sinuses are normal.    Impression  Left frontotemporal acute infarct. No acute hemorrhage. CT CODE NEURO PERF W CBF    Narrative  Clinical history: Code Neuro  INDICATION:   Code Neuro    COMPARISON:  785, 1/23/2019  CONTRAST: 100ml Isovue 370  TECHNIQUE:  CT dose reduction was achieved through use of a standardized protocol tailored  for this examination and automatic exposure control for dose modulation. Following the uneventful administration of Isovue-370 contrast material, axial  CT angiography of the head and neck was performed. Coronal and sagittal  reconstructions were obtained. 3D MAXIMAL INTENSITY PROJECTION reconstructed imaging of the cranial vasculature  in both the coronal and sagittal plane was performed. CTA perfusion imaging was also obtained.  Reconstructions were created with color  coding of axial time to peak, axial blood volume, axial blood flow, axial mean  transit time and axial T Max. The study was analyzed by the Nely Urias. Algorithm    FINDINGS:  Atherosclerotic changes the origins of the ICA on the right on the left. The  left vertebral artery is dominant. Vertebral artery origins are patent. .  No  large thyroid lesion. No acute cervical process. No evidence of acute  intracranial hemorrhage. . The paranasal sinuses are clear. CTA NECK:  There is conventional three vessel arch anatomy. The origins of the innominate,  left common carotid and left subclavian arteries are normal. Mild  atherosclerotic change in stenosis of the mid cervical internal carotid arteries  on the right and on the left. Tortuosity of the distal cervical internal carotid  arteries. There is  0%stenosis in the right internal carotid artery utilizing NASCET  criteria. There is  0%stenosis in the right internal carotid artery utilizing NASCET  criteria. CTA HEAD:  Vertebral artery terminates in PICA. The left vertebral artery is patent. Mild  atherosclerotic change of the V4 segment of the left vertebral artery. Cavernous  ICAs demonstrate atherosclerotic change but no occlusion. A1 vessels are patent. M1 vessels are patent. A2 and A3 vessels are patent. . The internal carotid,  anterior cerebral, and middle cerebral arteries are patent. There is no  flow-limiting intracranial stenosis. There is no aneurysm. There are no sizable  posterior communicating arteries. CT PERFUSION:    The perfusion abnormality is demonstrated in the posterior left frontal  lobe/left parietal lobe with area of complete ischemia suggested in the  posterior left frontal lobe. . No evidence of reversible ischemia. R CBF<30% :2 mL  Tmax >6s: 12 mL    Impression  There is no major vessel occlusion. There is no hemodynamically significant stenosis. There is no aneurysm or  dissection identified.   Diminished perfusion in the posterior left frontal/anterior left parietal lobe.  There is no concordant vessel occlusion identified. .  Stable chronic microvascular ischemic change and cerebral atrophy. .      CTA CODE NEURO HEAD AND NECK W CONT    Narrative  Clinical history: Code Neuro  INDICATION:   Code Neuro    COMPARISON:  426, 1/23/2019  CONTRAST: 100ml Isovue 370  TECHNIQUE:  CT dose reduction was achieved through use of a standardized protocol tailored  for this examination and automatic exposure control for dose modulation. Following the uneventful administration of Isovue-370 contrast material, axial  CT angiography of the head and neck was performed. Coronal and sagittal  reconstructions were obtained. 3D MAXIMAL INTENSITY PROJECTION reconstructed imaging of the cranial vasculature  in both the coronal and sagittal plane was performed. CTA perfusion imaging was also obtained. Reconstructions were created with color  coding of axial time to peak, axial blood volume, axial blood flow, axial mean  transit time and axial T Max. The study was analyzed by the Nely Urias. Algorithm    FINDINGS:  Atherosclerotic changes the origins of the ICA on the right on the left. The  left vertebral artery is dominant. Vertebral artery origins are patent. .  No  large thyroid lesion. No acute cervical process. No evidence of acute  intracranial hemorrhage. . The paranasal sinuses are clear. CTA NECK:  There is conventional three vessel arch anatomy. The origins of the innominate,  left common carotid and left subclavian arteries are normal. Mild  atherosclerotic change in stenosis of the mid cervical internal carotid arteries  on the right and on the left. Tortuosity of the distal cervical internal carotid  arteries. There is  0%stenosis in the right internal carotid artery utilizing NASCET  criteria. There is  0%stenosis in the right internal carotid artery utilizing NASCET  criteria. CTA HEAD:  Vertebral artery terminates in PICA. The left vertebral artery is patent.  Mild  atherosclerotic change of the V4 segment of the left vertebral artery. Cavernous  ICAs demonstrate atherosclerotic change but no occlusion. A1 vessels are patent. M1 vessels are patent. A2 and A3 vessels are patent. . The internal carotid,  anterior cerebral, and middle cerebral arteries are patent. There is no  flow-limiting intracranial stenosis. There is no aneurysm. There are no sizable  posterior communicating arteries. CT PERFUSION:    The perfusion abnormality is demonstrated in the posterior left frontal  lobe/left parietal lobe with area of complete ischemia suggested in the  posterior left frontal lobe. . No evidence of reversible ischemia. R CBF<30% :2 mL  Tmax >6s: 12 mL    Impression  There is no major vessel occlusion. There is no hemodynamically significant stenosis. There is no aneurysm or  dissection identified. Diminished perfusion in the posterior left frontal/anterior left parietal lobe. There is no concordant vessel occlusion identified. .  Stable chronic microvascular ischemic change and cerebral atrophy. .    Assessment:   Active Problems:    Ischemic stroke ([facility] [address] (6/12/2021)      Plan:   1.) CVA              - s/p tPA in ED 21:56 6/12.               - As seen on MRI 6/13- acute ischemia left temporal lobe and posterior frontal parietal deep white matter   - Above correlates with CTA/CTP on arrival- M4 posterior division occlusion too distal for EVT with associated left frontoparietal lobe perfusion abnormality    - CT head 24 hours post tPA without hemorrhage               - Begin ASA 81mg daily   - Begin Xarelto 20mg daily d/t likely cardioembolic nature of CVA              - NIHSS of 5 on initial on exam, now 0              - q4 neuro checks              - A1C 6.2- no medical intervention necessary but did discuss dietary modifications/risk factors    - .8- Resume Lipitor 40mg daily- no dose increase necessary.  Has been noncompliant x 1 year but in 2019 when compliant LDL 61.               - ECHO ordered              - PT/OT/SLP evals; do not anticipate skilled needs               - Stroke education              - SBP goal <140              - Labetalol/Cardene PRN   - Ok for transfer out of ICU from neuro standpoint          Plan discussed with Dr. Marissa Bradley, the patient and the primary RN. The patient verbalized understanding of the current plan of care and is in agreement. Haylie Haji NP  Neurocritical Care Nurse Practitioner    Staff Addendum:  Lm Irizarry reviewed the documentation provided by the nurse practitioner, discussed her findings, clinical impression, and the proposed management plans with regards to this encounter. Karen Christie have personally evaluated the patient and verified the history and confirmed the physical findings.  Below are my additional findings:     76year old male with a h/o CAD/MI, cardiomyopathy, embolic stroke previously maintained on ASA/Coumadin, HPL, medication non-compliance presenting with word finding difficulty, dysarthria as well as RUE numbness 6/12/21 improving after tPA. CTH/CTA reviewed showing distal L MCA occlusion not amenable to endovascular intervention per NIS, remote R cerebellar infarct, decreased perfusion L frontoparietal territory on CTP. On examination, PERRL, EOMI, VFF, tongue ML, face symmetric with intact sensation throughout, strength 5/5 UE/LE b/l, no ataxia. Suspect cardioembolic mechanism for recent distal L MCA occlusion/L MCA infarct. Will discharge on Xarelto+ASA and statin therapy for stroke prevention. Goal SBP<140, LDL<70. No anticipated skilled needs. May initiate discharge planning.  Will need Neuro follow up in 4-6 weeks.      Tony Romo,   06/14/21

## 2021-06-14 NOTE — PROGRESS NOTES
0730: Bedside and Verbal shift change report given to Syd Hutson RN (oncoming nurse) by Loyda Mondragon RN (offgoing nurse). Report included the following information SBAR, Kardex, Intake/Output, MAR, Accordion, Recent Results, Med Rec Status, Cardiac Rhythm NSR', Alarm Parameters  and Dual Neuro Assessment. [id number]: Call placed to Echo department will notify tech to have Echo completed this am prior to d/c    1400: per Dr. Marco Alba pt is clear to discharge home from a neuro standpoint; reached out to hospitalist NP and pt will need cardiology clearance as well prior to d/c based on his EF noted on most recent echo     1415: Consult placed to [facility]; per office Dr. Taqueria Bowen will be the one seeing the pt today.     1620: per Dr. Taqueria Bowen pt is clear to discharge home and will follow up with their practice (Dr. Han Wells is pts cardiologist) outpatient; hospitalist notified

## 2021-06-14 NOTE — PROGRESS NOTES
OCCUPATIONAL THERAPY EVALUATION/DISCHARGE  Patient: Alina Cox (89 y.o. male)  Date: 6/14/2021  Primary Diagnosis: Ischemic stroke ([facility] [address] [I63.9]       Precautions:   (SBP <140)    ASSESSMENT  Based on the objective data described below, the patient presents with resolved R hand numbness and speech impairments following admission for CVA with MRI positive for acute or subacute nonhemorrhagic ischemia in the left temporal lobe and posterior frontal parietal subcortical deep white matter, corresponding with areas of perfusion abnormality. He demonstrated good safety awareness, no LOB and is back to his functional baseline of being independent. No further skilled OT required at this time. Current Level of Function (ADLs/self-care): supervision to independent    Functional Outcome Measure: The patient scored Total A-D  Total A-D (Motor Function): 66/66 on the Fugl-Del Rio Assessment which is indicative of no impairment in upper extremity functional status. Other factors to consider for discharge: none     PLAN :  Recommendation for discharge: (in order for the patient to meet his/her long term goals)  No skilled occupational therapy/ follow up rehabilitation needs identified at this time. This discharge recommendation:  Has not yet been discussed the attending provider and/or case management    IF patient discharges home will need the following DME: none       SUBJECTIVE:   Patient stated I feel fine now. 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  Expanded or extensive additional review of patient history:     Home Situation  Home Environment: Private residence  # Steps to Enter: 5  Rails to Enter: Yes  Hand Rails : Bilateral  One/Two Story Residence: Two story (3 stories and office is ouside of home)  # of Interior Steps: 15  Interior Rails: Left  Living Alone: No  Support Systems: Family member(s), Spouse/Significant Other/Partner (mother-in-law)  Patient Expects to be Discharged to[de-identified] [employer]  Current DME Used/Available at Home: None  Tub or Shower Type: Shower    Hand dominance: Right    EXAMINATION OF PERFORMANCE DEFICITS:  Cognitive/Behavioral Status:  Neurologic State: Alert; Appropriate for age  Orientation Level: Oriented X4  Cognition: Appropriate decision making; Appropriate for age attention/concentration; Appropriate safety awareness; Follows commands  Perception: Appears intact  Perseveration: No perseveration noted  Safety/Judgement: Awareness of environment;Driving appropriateness; Fall prevention;Home safety;Good awareness of safety precautions; Insight into deficits    Hearing: Auditory  Auditory Impairment: None    Vision/Perceptual:    Acuity: Within Defined Limits    Corrective Lenses: Reading glasses    Range of Motion:  INT                            Strength:  Strength: Within functional limits                Coordination:  Coordination: Within functional limits  Fine Motor Skills-Upper: Left Intact; Right Intact    Gross Motor Skills-Upper: Left Intact; Right Intact    Tone & Sensation:  Tone: Normal  Sensation: Intact                      Balance:  Sitting: Intact  Standing: Intact    Functional Mobility and Transfers for ADLs:  Bed Mobility:  Supine to Sit: Independent  Sit to Supine: Independent    Transfers:  Sit to Stand: Supervision  Stand to Sit: Supervision  Bathroom Mobility: Supervision/set up  Toilet Transfer : Supervision    ADL Assessment:  Feeding: Independent    Oral Facial Hygiene/Grooming: Supervision (standing at sink)    Bathing: Supervision    Upper Body Dressing: Independent    Lower Body Dressing: Supervision    Toileting: Supervision     Cognitive Retraining  Safety/Judgement: Awareness of environment;Driving appropriateness; Fall prevention;Home safety;Good awareness of safety precautions; Insight into deficits       Functional Measure:  Fugl-Del Rio Assessment of Motor Recovery after Stroke: BUEs  Reflex Activity  Flexors/Biceps/Fingers: Can be elicited  Extensors/Triceps: Can be elicited  Reflex Subtotal: 4    Volitional Movement Within Synergies  Shoulder Retraction: Full  Shoulder Elevation: Full  Shoulder Abduction (90 degrees): Full  Shoulder External Rotation: Full  Elbow Flexion: Full  Forearm Supination: Full  Shoulder Adduction/Internal Rotation: Full  Elbow Extension: Full  Forearm Pronation: Full  Subtotal: 18    Volitional Movement Mixing Synergies  Hand to Lumbar Spine: Full  Shoulder Flexion (0-90 degrees): Full  Pronation-Supination: Full  Subtotal: 6    Volitional Movement With Little or No Synergy  Shoulder Abduction (0-90 degrees): Full  Shoulder Flexion ( degrees): Full  Pronation/Supination: Full  Subtotal : 6    Normal Reflex Activity  Biceps, Triceps, Finger Flexors: Full  Subtotal : 2    Upper Extremity Total   Upper Extremity Total: 36    Wrist  Stability at 15 Degree Dorsiflexion: Full  Repeated Dorsiflexion/ Volar Flexion: Full  Stability at 15 Degree Dorsiflexion: Full  Repeated Dorsiflexion/ Volar Flexion: Full  Circumduction: Full  Wrist Total: 10    Hand  Mass Flexion: Full  Mass Extension: Full  Grasp A: Full  Grasp B: Full  Grasp C: Full  Grasp D: Full  Grasp E: Full  Hand Total: 14    Coordination/Speed  Tremor: None  Dysmetria: None  Time: <1s (BUEs 4 sec)  Coordination/Speed Total : 6    Total A-D  Total A-D (Motor Function): 66/66     This is a reliable/valid measure of arm function after a neurological event.  It has established value to characterize functional status and for measuring spontaneous and therapy-induced recovery; tests proximal and distal motor functions. Fugl-Del Rio Assessment - UE scores recorded between five and 30 days post neurologic event can be used to predict UE recovery at six months post neurologic event. Severe = 0-21 points   Moderately Severe = 22-33 points   Moderate = 34-47 points   Mild = 48-66 points  LINH Arteaga, ABDULKADIR Meléndez, & LONDON Elliott (1992). Measurement of motor recovery after stroke: Outcome assessment and sample size requirements.  Stroke, 23, pp. 6629-9063.   ------------------------------------------------------------------------------------------------------------------------------------------------------------------  MCID:  Stroke:   Oneal Wing et al, 2001; n = 171; mean age 79 (6) years; assessed within 16 (12) days of stroke, Acute Stroke)  FMA Motor Scores from Admission to Discharge   10 point increase in FMA Upper Extremity = 1.5 change in discharge FIM   10 point increase in FMA Lower Extremity = 1.9 change in discharge FIM  MDC:   Stroke:   Gui Spencer et al, 2008, n = 14, mean age = 59.9 (14.6) years, assessed on average 14 (6.5) months post stroke, Chronic Stroke)   FMA = 5.2 points for the Upper Extremity portion of the assessment     Occupational Therapy Evaluation Charge Determination   History Examination Decision-Making   LOW Complexity : Brief history review  LOW Complexity : 1-3 performance deficits relating to physical, cognitive , or psychosocial skils that result in activity limitations and / or participation restrictions  LOW Complexity : No comorbidities that affect functional and no verbal or physical assistance needed to complete eval tasks       Based on the above components, the patient evaluation is determined to be of the following complexity level: LOW   Pain Ratin/10    Activity Tolerance:   Good    After treatment patient left in no apparent distress:    Call bell within reach and seated EOB    COMMUNICATION/EDUCATION:   The patients plan of care was discussed with: Physical therapist and Registered nurse. Patient was educated regarding his deficit(s) of resolved R hand numbness and impaired speech as this relates to his diagnosis of acute or subacute nonhemorrhagic ischemia in the left temporal lobe and posterior frontal parietal subcortical deep white matter, corresponding with  areas of perfusion abnormality. He demonstrated Good understanding as evidenced by awareness of situation. Patient and/or family was verbally educated on the BE FAST acronym for signs/symptoms of CVA and TIA. BE FAST was written on patient's communication board  for visual education and reinforcement. All questions answered with patient indicating good understanding.      Thank you for this referral.  Shauna Brown, ISIDRA  Time Calculation: 11 mins

## 2021-06-14 NOTE — CONSULTS
Dr. Fred Ramirez. [phone]            Cardiology Consult/Progress Note      Requesting/referring provider: None     Reason for Consult: Cardiomyopathy    Assessment/Plan:  1. History of remote anterior microinfarction with post MI LV dysfunction  2. Ischemic cardiomyopathy secondary to #1  3. History of medical noncompliance. 4.  Recurrent strokes post LV dysfunction. 5.  Recent admission with acute CVA status post TPA  6. Hypertension  7. Hyperlipidemia    Mr. Dolores Jefferson was seen at the request of Dr. Maris Hitchcock. He is admitted to the hospital for acute stroke status post TPA now with recovery of acute neurological deficit. From a cardiac standpoint he has known underlying ischemic cardiomyopathy. He is most recent echocardiogram shows probably reduced EF although endocardial definition is suboptimal.  Hence exact EF assessment is not possible. Moreover we cannot rule out an LV apical thrombus from the echocardiogram.  He has been empirically initiated on oral anticoagulant which is reasonable. Recommend performing outpatient echocardiogram with Definity to rule out apical thrombus as well. This can be performed by Dr. Jaimie Moore in the clinic. Previous discussions regarding ICD placement have been inconclusive as patient has declined it in the past.  He was also not taking his medications as recommended although we would recommend GDMT for heart failure with ejection fraction. Hence lisinopril and carvedilol should be continued. I will defer to Dr. Jaimie Moore to add spironolactone as an outpatient. He has been started empirically on oral anticoagulation which may be reasonable strategy until LV thrombus is ruled out are underlying atrial fibrillation is ruled out.   Alternatively we may just leave the patient on long-term oral anticoagulation in the absence of contraindications given that he is at high risk of having cardioembolic or recurrent events. Investigations ordered    []    High complexity decision making was performed  []    Patient is at high-risk of decompensation with multiple organ involvement  []    Complex/difficult social determinants of health outcomes    Investigations personally reviewed and interpreted  Echocardiogram personally reviewed. LV dysfunction with reduced wall motion in apical and mid anteroseptal wall. EF is difficult to determine but overall LV function appears to be severely reduced. Apical clot cannot be ruled out. Investigations reviewed    HPI: Anisha Kaminski, a 76y.o. year-old who presents for evaluation of cardiomyopathy following a stroke. Mr. Jem Travis has prior history of acute anterior myocardial infarction for which he underwent revascularization. Nonetheless he suffered significant myocardial injury and has apical scar. He has had 2 strokes since his acute MI etiology of which is uncertain and could be potentially cardioembolic. Atrial fibrillation has never been ruled out. On his most recent echocardiogram it was difficult to rule out apical clot. Cardiology has been consulted to suggest treatment strategies for his cardiomyopathy. There have been issues with previous noncompliance. He also had a discussion regarding ICD placement which ended with no ICD since he and one of his known family members who is a physician decided to defer ICD. Currently he reports no shortness of breath or chest pain. His initial presentation to the hospital was for acute neurological symptoms which have resolved after giving TPA. He  has a past medical history of CAD (coronary artery disease), Received intravenous tissue plasminogen activator (tPA) in emergency department, and Stroke (cerebrum) ([facility] [address] Review of system:Patient reports no dyspnea/PND/Orthpnea/CP. He reports no cough/fever/focal neurological deficits/abdominal pain. All other systems negative except as above. No family history on file.    Social History     Socioeconomic History    Marital status:      Spouse name: Not on file    Number of children: Not on file    Years of education: Not on file    Highest education level: Not on file   Tobacco Use    Smoking status: Never Smoker    Smokeless tobacco: Never Used   Substance and Sexual Activity    Alcohol use: Yes     Comment: Weekly    Drug use: No    Sexual activity: Yes     Social Determinants of Health     Financial Resource Strain:     Difficulty of Paying Living Expenses:    Food Insecurity:     Worried About Running Out of Food in the Last Year:     [address] in the Last Year:    Transportation Needs:     Lack of Transportation (Medical):  Lack of Transportation (Non-Medical):    Physical Activity:     Days of Exercise per Week:     Minutes of Exercise per Session:    Stress:     Feeling of Stress :    Social Connections:     Frequency of Communication with Friends and Family:     Frequency of Social Gatherings with Friends and Family:     Attends Congregational Services:     Active Member of Clubs or Organizations:     Attends Club or Organization Meetings:     Marital Status:       PE  Vitals:    06/14/21 1400 06/14/21 1500 06/14/21 1600 06/14/21 1611   BP: (!) 85/34 120/85 125/86 125/86   Pulse: 80 (!) 59 65 65   Resp: 24 16 19    Temp:   97.8 °F (36.6 °C)    SpO2:       Weight:       Height:        Body mass index is 42.59 kg/m². General:    Alert, cooperative, no distress. Psychiatric:    Normal Mood and affect    Eye/ENT:      Pupils equal, No asymmetry, Conjunctival pink. Able to hear voice at normal amplitude   Lungs:      Visibly symmetric chest expansion, No palpable tenderness. Clear to auscultation bilaterally. Heart[de-identified]    Regular rate and rhythm, S1, S2 normal, no murmur, click, rub or gallop. No JVD, Normal palpable peripheral pulses. No cyanosis   Abdomen:     Soft, non-tender. Bowel sounds normal. No masses,  No      organomegaly.    Extremities: Extremities normal, atraumatic, no edema. Neurologic:   CN II-XII grossly intact. No gross focal deficits           Recent Labs:  Lab Results   Component Value Date/Time    Cholesterol, total 168 06/13/2021 06:53 AM    HDL Cholesterol 41 06/13/2021 06:53 AM    LDL, calculated 100.8 (H) 06/13/2021 06:53 AM    Triglyceride 131 06/13/2021 06:53 AM    CHOL/HDL Ratio 4.1 06/13/2021 06:53 AM     Lab Results   Component Value Date/Time    Creatinine (POC) 0.9 04/10/2012 03:15 AM    Creatinine 0.99 06/14/2021 04:36 AM     Lab Results   Component Value Date/Time    BUN 16 06/14/2021 04:36 AM    BUN (POC) 17 04/10/2012 03:15 AM     Lab Results   Component Value Date/Time    Potassium 4.0 06/14/2021 04:36 AM     Lab Results   Component Value Date/Time    Hemoglobin A1c 6.2 (H) 06/13/2021 06:53 AM     Lab Results   Component Value Date/Time    Hemoglobin (POC) 16.0 04/10/2012 03:15 AM    HGB 14.7 06/14/2021 04:36 AM     Lab Results   Component Value Date/Time    PLATELET 428 51/65/9279 04:36 AM       Reviewed:  Past Medical History:   Diagnosis Date    CAD (coronary artery disease)     stent, MI 4-10-12    Received intravenous tissue plasminogen activator (tPA) in emergency department     Stroke [facility]rebrum[facility])      Social History     Tobacco Use   Smoking Status Never Smoker   Smokeless Tobacco Never Used     Social History     Substance and Sexual Activity   Alcohol Use Yes    Comment: Weekly     No Known Allergies  No family history on file.      Current Facility-Administered Medications   Medication Dose Route Frequency    aspirin chewable tablet 81 mg  81 mg Oral DAILY    rivaroxaban (XARELTO) tablet 20 mg  20 mg Oral DAILY    lisinopriL (PRINIVIL, ZESTRIL) tablet 10 mg  10 mg Oral DAILY    carvediloL (COREG) tablet 3.125 mg  3.125 mg Oral BID    atorvastatin (LIPITOR) tablet 40 mg  40 mg Oral QHS    labetaloL (NORMODYNE;TRANDATE) injection 5 mg  5 mg IntraVENous Q5MIN PRN    sodium chloride (NS) flush 5-40 mL 5-40 mL IntraVENous Q8H    sodium chloride (NS) flush 5-40 mL  5-40 mL IntraVENous PRN    acetaminophen (TYLENOL) tablet 650 mg  650 mg Oral Q6H PRN    Or    acetaminophen (TYLENOL) suppository 650 mg  650 mg Rectal Q6H PRN    polyethylene glycol (MIRALAX) packet 17 g  17 g Oral DAILY PRN    ondansetron (ZOFRAN ODT) tablet 4 mg  4 mg Oral Q8H PRN    Or    ondansetron (ZOFRAN) injection 4 mg  4 mg IntraVENous Q6H PRN       Fiorella Calabrese MD06/14/21     ATTENTION:   This medical record was transcribed using an electronic medical records/speech recognition system. Although proofread, it may and can contain electronic, spelling and other errors. Corrections may be executed at a later time. Please feel free to contact us for any clarifications as needed.     [employer] [facility] [place]  [employer], [employer],  [address] [facility], [address] [facility] [phone]

## 2021-06-14 NOTE — PROGRESS NOTES
[facility]    ICU TEAM Progress Note    Name: Rhonda Quinones   : 1955   MRN: [id number]   Date: 2021      Subjective:   Progress Note: 2021      Mr Lizzy Sidhu is a 75 yo male with a PMH of HLD, HTN, prior CVA (non-compliant with asa/coumadin), CAD, MI who presented to the ED on  with R hand numbness and expressive aphasia. CT head did not show hemorrhage, notable for remote R cerebellar infarct, possible L dense M2 segment. Teleneurology was consulted and TPA was given at 21:56 on . NIHSS 5 prior to TPA. CTA head/nect and CTP negative for major vessel occlusion/aneurysm/dissection. M4 posterior occlusion too distal for EVT per NIS. CTP notable for perfusion abnormality in the posterior L frontal lobe/L parietal lobe. No evidence of reversible ischemia. He was taken tot he ICU for further monitoring after TPA. Symptoms improved after TPA. NIHSS 1 after TPA, now 0. Active Problem List:     Problem List  Date Reviewed: 2020        Codes Class    Ischemic stroke [facility]) ICD-10-CM: I63.9  ICD-9-CM: 434.91         Impending cerebrovascular accident [facility]) ICD-10-CM: I63.9  ICD-9-CM: 435.9         Long term (current) use of anticoagulants ICD-10-CM: Z79.01  ICD-9-CM: V58.61         Obesity, morbid ([facility] ICD-10-CM: E66.01  ICD-9-CM: 278.01         CVA (cerebral vascular accident) ([facility] ICD-10-CM: I63.9  ICD-9-CM: 434.91         Hyperlipidemia ICD-10-CM: E78.5  ICD-9-CM: 272.4         Coronary atherosclerosis of native coronary artery ICD-10-CM: I25.10  ICD-9-CM: 414.01         Acute anterior wall MI ([facility] ICD-10-CM: I21.09  ICD-9-CM: 410.10     Overview Addendum 2012  3:40 PM by Ina Morrow MD      100% mid lad treated with single suly. 60% om1, 80% prox rca.                      Past Medical History:      has a past medical history of CAD (coronary artery disease), Received intravenous tissue plasminogen activator (tPA) in emergency department, and Stroke (cerebrum) ([facility] [address] Past Surgical History:      has a past surgical history that includes pr cardiac surg procedure unlist.    Home Medications:     Prior to Admission medications    Medication Sig Start Date End Date Taking? Authorizing Provider   warfarin (COUMADIN) 5 mg tablet TAKE 1 TABLET ON WEDNESDAY AND TAKE 2 TABS ALL OTHER DAYS 20   Jany Hogue III, MD   atorvastatin (LIPITOR) 40 mg tablet Take 1 Tab by mouth nightly. 10/23/20   Jany Hogue III, MD   lisinopriL (PRINIVIL, ZESTRIL) 20 mg tablet TAKE 1 TABLET BY MOUTH ONE TIME DAILY 20   Jany Hogue III, MD   spironolactone (ALDACTONE) 25 mg tablet TAKE 1 TABLET BY MOUTH ONE TIME DAILY 20   Jany Hogue III, MD   carvedilol (COREG) 6.25 mg tablet Take 1 Tab by mouth two (2) times daily (with meals). 19   Birdie Baez MD   aspirin 81 mg chewable tablet Take 1 Tab by mouth daily. 8/15/19   Birdie Baez MD       Allergies/Social/Family History:     No Known Allergies   Social History     Tobacco Use    Smoking status: Never Smoker    Smokeless tobacco: Never Used   Substance Use Topics    Alcohol use: Yes     Comment: Weekly      No family history on file.     Review of Systems:     As per HPI    Objective:   Vital Signs:  Visit Vitals  BP (!) 145/68   Pulse (!) 56   Temp 98 °F (36.7 °C)   Resp 14   Ht 6' 1\" (1.854 m)   Wt 142.5 kg (314 lb 2.5 oz)   SpO2 94%   BMI 41.45 kg/m²      O2 Device: None (Room air) Temp (24hrs), Av °F (36.7 °C), Min:97.7 °F (36.5 °C), Max:98.3 °F (36.8 °C)           Intake/Output:     Intake/Output Summary (Last 24 hours) at 2021 0811  Last data filed at 2021 1700  Gross per 24 hour   Intake 120 ml   Output --   Net 120 ml     Physical Exam:    General:  alert, cooperative, no distress, appears stated age  Eye: PERRLA, EOMI  Neurologic:  A/O x3, non-focal  Neck: Supple, no JVD  Lungs:  clear to auscultation bilaterally  Heart:  regular rate and rhythm, S1, S2 normal, no murmur, click, rub or gallop  Abdomen:  soft, non-tender. Bowel sounds normal. No masses,  no organomegaly  Skin:  no rash or abnormalities    LABS AND  DATA: Personally reviewed  Recent Labs     06/14/21 0436 06/13/21 0653   WBC 6.3 5.0   HGB 14.7 11.4*   HCT 45.8 35.4*    97*     Recent Labs     06/14/21  0436 06/13/21  0653    138   K 4.0 5.0    107   CO2 25 25   BUN 16 15   CREA 0.99 0.96   GLU 97 96   CA 8.7 8.4*   MG  --  2.1   PHOS  --  4.0     Recent Labs     06/12/21 2122   AP 64   TP 7.5   ALB 3.7   GLOB 3.8     Recent Labs     06/12/21 2136   INR 1.0      No results for input(s): PHI, PCO2I, PO2I, FIO2I in the last 72 hours. Recent Labs     06/12/21 2122   TROIQ <0.05       RA      MRI brain: 1. Acute or subacute nonhemorrhagic ischemia in the left temporal lobe and  posterior frontal parietal subcortical deep white matter, corresponding with  areas of perfusion abnormality. ..  2. Microvascular ischemic and age-related change, greater than expected for  stated age.       MEDS: Reviewed    Chest X-Ray: personally reviewed and report checked    TTE: pending    Assessment:     Ischemic CVA: Distal L MCA occlusion not amenable to endovascular intervention per NIS, remote R cerebellar infarct, decreased perfusion L frontoparietal territory on CTP. Post 24h CT head w/o hemorrhage. MRI 6/13 with acute ischemia L temporal lobe and posterior frontal parietal deep white matter  - Aspirin 81mg/day  - Xarleto 20mg/day  - Dietary modifications for A1C 6.2  - TTE ordered    HLD:   - Continue lipitor 40mg daily    HTN: Goal SBP <140  - Labetalol/cardene PRN    DISPOSITION  NSTU    CRITICAL CARE CONSULTANT NOTE  I had a face to face encounter with the patient, reviewed and interpreted patient data including clinical events, labs, images, vital signs, I/O's, and examined patient.   I have discussed the case and the plan and management of the patient's care with the consulting services, the bedside nurses and the respiratory therapist.      Level [address] Lanre Alejo NP  [facility] [place] [facility]  6/14/2021

## 2021-06-14 NOTE — DISCHARGE INSTRUCTIONS
Discharge Instructions       PATIENT ID: Pam Hogue  MRN: [id number]   YOB: 1955    DATE OF ADMISSION: 6/12/2021  9:22 PM    DATE OF DISCHARGE: 6/14/2021    PRIMARY CARE PROVIDER: None     ATTENDING PHYSICIAN: Ryan Rasmussen MD  DISCHARGING PROVIDER: Patrick Lloyd NP    To contact this individual call [phone] and ask the  to page. If unavailable ask to be transferred the Adult Hospitalist Department. DISCHARGE DIAGNOSES Ischemic CVA    CONSULTATIONS: IP CONSULT TO INTENSIVIST  IP CONSULT TO CARDIOLOGY    PROCEDURES/SURGERIES: * No surgery found *    PENDING TEST RESULTS:   At the time of discharge the following test results are still pending: None     FOLLOW UP APPOINTMENTS:   Follow-up Information     Follow up With Specialties Details Why Contact Info    Jeremias Sandoval MD Cardiology Schedule an appointment as soon as possible for a visit For follow up within 2-3 weeks  [address] [facility] [phone]      Kushal Lugo DO Neurology Schedule an appointment as soon as possible for a visit For CVA followup in 4-6 weeks [address] [place] [address]  [phone]      PCP   Schedule an appointment as soon as possible for a visit For follow up witin one week             ADDITIONAL CARE RECOMMENDATIONS:   Take medications as prescribed. It is very important that you follow up with your primary care provider, cardiology and neurology     DIET: Cardiac Diet      ACTIVITY: Activity as tolerated    WOUND CARE: None     EQUIPMENT needed: None       DISCHARGE MEDICATIONS:   See Medication Reconciliation Form    · It is important that you take the medication exactly as they are prescribed. · Keep your medication in the bottles provided by the pharmacist and keep a list of the medication names, dosages, and times to be taken in your wallet. · Do not take other medications without consulting your doctor. NOTIFY YOUR PHYSICIAN FOR ANY OF THE FOLLOWING:   Fever over 101 degrees for 24 hours. Chest pain, shortness of breath, fever, chills, nausea, vomiting, diarrhea, change in mentation, falling, weakness, bleeding. Severe pain or pain not relieved by medications. Or, any other signs or symptoms that you may have questions about.       DISPOSITION:   X Home With:   OT  PT  DANIELA  RN       SNF/Inpatient Rehab/LTAC    Independent/assisted living    Hospice    Other:       Signed:   Esha Ashraf NP  6/14/2021  5:08 PM

## 2021-06-14 NOTE — PROGRESS NOTES
[facility] [address] [place] [address] [address], [address] [facility] [address]   Justin Patel   [phone]  Mauro Willi  6/14/2021     Primary Cardiologist: Alina Dunlap MD      Assessment/Plan:    1. Ischemic CVA   - Distal L MCA occlusion not amenable to endovascular intervention per NIS   - Remote R cerebellar infarct, decreased perfusion L frontoparietal territory on CTP.    - MRI 6/13 with acute ischemia L temporal lobe and posterior frontal parietal deep      white matter   - Neuro following   - Post tPA imaging without hemorrhage - Xarelto started. ASA resumed  2. CAD   - s/p PCI with JARED to LAD 4/2012              - residual dz - disease in OM1 and OM2 at 60% and RCA at 80%    - Cath 2019  Findings: LAD widely patent stent, no significant disease. LCX co-dominant, first marginal has multiple high grade lesions including at ostium. Ramus small with tight lesion, 2.0 vessel. RCA small with multiple lesions proximal and mid. LV dilated with apical akinesis and severe global hypokinesis, EF 20%, LVEDP 40-45.    - ASA, Lipitor. 3. ICM   - chronic   - NYHA class I/II  ECHO ADULT COMPLETE 06/14/2021 6/14/2021    Interpretation Summary  · Image quality for this study was technically difficult. · LV: Estimated LVEF is 20 - 25%. Normal cavity size and wall thickness. Moderate-to-severely and globally reduced systolic function. · LA: Moderately dilated left atrium. · Saline contrast given, imaging was suboptimal to evaluate for possible shunt. Signed by: Jens Mares MD on 6/14/2021 10:36 AM       - Mostly unchanged echo findings compared to previous echos   - Needs to resume GDT meds - start low dose Lisinopril and Coreg. Hold off on Aldactone as K is higher. * PTA Lisinopril 20 mg, Aldactone 25 mg and Coreg 6.25 mg - unable to increase 2/2 lower HR.   4. HLD  Cholesterol, total 168 06/13/2021 06:53 AM   HDL Cholesterol 41 06/13/2021 06:53 AM   LDL, calculated 100.8 (H) 06/13/2021 06:53 AM   VLDL, calculated 26.2 06/13/2021 06:53 AM   Triglyceride 131 06/13/2021 06:53 AM   CHOL/HDL Ratio 4.1 06/13/2021 06:53 AM    - Lipitor 40 mg  5. HTN   - SBP goal <140   - resume GDT meds - start low dose    HPI:  Presented for R hand numbness and expressive aphasia. No hemorrhage on head CT, remote R cerebellar infarct. Given tPA, with success. M4 posterior occlusion, deemed too distal for EVT per NIS. H/o prior CVA in 2019 (MRI showed multiple foci consistent with embolic source), started on Coumadin per Neuro recs; INR monitored at [facility]. H/o CAD s/p JARED to LAD. ICM, EF hovers around 30%. Usually on GDT meds, but with some non compliance. Followed up with Dr. Kendrick Menchaca - Large areas of akinesis noted on most recent echo. Likelihood of regaining much more LV function is low. Even if LVEF raises above 35% threshold, he has significant myocardial scar, which would be substrate for VT. Patient followed up with Dr. Mirlande Mcdonald on 8/2020 - Apparently his brother is a family physician in [place] and they talked as his brother said that they would not put a defibrillator in for his ejection fraction     Medical history:  CVA, CAD s/p JARED, ICM, NSTEMI, HLD, HTN,       Doroteo Pelayo MD to see this patient, as well, and provide full consult note.       Taina Cruz PA-C

## 2021-06-14 NOTE — PROGRESS NOTES
SPEECH PATHOLOGY BEDSIDE SWALLOW EVALUATION/DISCHARGE  Patient: Shirin Moore (21 y.o. male)  Date: 6/14/2021  Primary Diagnosis: Ischemic stroke [facility]) [I63.9]       Precautions: Fall       ASSESSMENT :  Based on the objective data described below, the patient presents with functional oropharyngeal swallow with no difficulties or s/s of aspiration appreciated with any consistenceies. Pt denies any speech or swallowing concerns. Recommend regular diet/thin liquids with general aspiration precautions as outlined below. Skilled acute therapy provided by a speech-language pathologist is not indicated at this time. SLP will sign off. PLAN :  Recommendations:  -- regular diet/thin liquids  -- general aspiration precautions including completely upright for all PO   Discharge Recommendations: None     SUBJECTIVE:   Patient stated Can I take a sip of milk instead? .     OBJECTIVE:     Past Medical History:   Diagnosis Date    CAD (coronary artery disease)     stent, MI 4-10-12    Received intravenous tissue plasminogen activator (tPA) in emergency department     Stroke (cerebrum) [facility])      Past Surgical History:   Procedure Laterality Date    NH CARDIAC SURG PROCEDURE UNLIST      cath with stents     Prior Level of Function/Home Situation:   Home Situation  Home Environment: Private residence  One/Two Story Residence: Other (Comment) (3)  Living Alone: No  Support Systems: Family member(s), Spouse/Significant Other/Partner  Patient Expects to be Discharged to[de-identified] House  Current DME Used/Available at Home: None  Diet prior to admission: regular/thin  Current Diet:  Regular/thin   Cognitive and Communication Status:  Neurologic State: Alert  Orientation Level: Oriented X4  Cognition: Follows commands, Appropriate for age attention/concentration  Perception: Appears intact  Perseveration: No perseveration noted  Safety/Judgement: Awareness of environment  Oral Assessment:  Oral Assessment  Labial: No impairment  Dentition: Intact; Natural  Oral Hygiene: moist oral mucosa free of secretions  Lingual: No impairment  Velum: No impairment  Mandible: No impairment  P.O. Trials:  Patient Position: upright in bed  Vocal quality prior to P.O.: No impairment  Consistency Presented: All consistencies (observed with breakfast tray )  How Presented: Self-fed/presented     Bolus Acceptance: No impairment  Bolus Formation/Control: No impairment     Propulsion: No impairment  Oral Residue: None  Initiation of Swallow: No impairment  Laryngeal Elevation: Functional  Aspiration Signs/Symptoms: None  Pharyngeal Phase Characteristics: No impairment, issues, or problems   Effective Modifications: None          Oral Phase Severity: No impairment  Pharyngeal Phase Severity : No impairment  NOMS:   The NOMS functional outcome measure was used to quantify this patient's level of swallowing impairment. Based on the NOMS, the patient was determined to be at level 7 for swallow function     NOMS Swallowing Levels:  Level 1 (CN): NPO  Level 2 (CM): NPO but takes consistency in therapy  Level 3 (CL): Takes less than 50% of nutrition p.o. and continues with nonoral feedings; and/or safe with mod cues; and/or max diet restriction  Level 4 (CK): Safe swallow but needs mod cues; and/or mod diet restriction; and/or still requires some nonoral feeding/supplements  Level 5 (CJ): Safe swallow with min diet restriction; and/or needs min cues  Level 6 (CI): Independent with p.o.; rare cues; usually self cues; may need to avoid some foods or needs extra time  Level 7 ([address] [place] [address]): Independent for all p.o.  GARO. (2003). National Outcomes Measurement System (NOMS): Adult Speech-Language Pathology User's Guide.        Pain:  Pain Scale 1: Numeric (0 - 10)  Pain Intensity 1: 0     After treatment:   Patient left in no apparent distress in bed, Call bell within reach and Nursing notified    COMMUNICATION/EDUCATION:   Patient was educated regarding his functional oropharyngeal swallow as this relates to his diagnosis of Cerebellar CVA. He demonstrated Good understanding as evidenced by verbalization of agreement. The patient's plan of care including recommendations, planned interventions, and recommended diet changes were discussed with: Registered nurse.      Thank you for this referral.  Marquita Cole M.S. CF-SLP   Time Calculation: 10 mins

## 2021-06-14 NOTE — PROGRESS NOTES
Problem: Falls - Risk of  Goal: *Absence of Falls  Description: Document Onalee Gum Fall Risk and appropriate interventions in the flowsheet.   Outcome: Progressing Towards Goal  Note: Fall Risk Interventions:  Mobility Interventions: Communicate number of staff needed for ambulation/transfer, Patient to call before getting OOB         Medication Interventions: Evaluate medications/consider consulting pharmacy, Patient to call before getting OOB, Teach patient to arise slowly                   Problem: TIA/CVA Stroke: 0-24 hours  Goal: Off Pathway (Use only if patient is Off Pathway)  Outcome: Progressing Towards Goal  Goal: Activity/Safety  Outcome: Progressing Towards Goal  Goal: Consults, if ordered  Outcome: Progressing Towards Goal  Goal: Diagnostic Test/Procedures  Outcome: Progressing Towards Goal  Goal: Nutrition/Diet  Outcome: Progressing Towards Goal  Goal: Discharge Planning  Outcome: Progressing Towards Goal  Goal: Medications  Outcome: Progressing Towards Goal  Goal: Respiratory  Outcome: Progressing Towards Goal  Goal: Treatments/Interventions/Procedures  Outcome: Progressing Towards Goal  Goal: Minimize risk of bleeding post-thrombolytic infusion  Outcome: Progressing Towards Goal  Goal: Monitor for complications post-thrombolytic infusion  Outcome: Progressing Towards Goal  Goal: *Hemodynamically stable  Outcome: Progressing Towards Goal  Goal: *Neurologically stable  Description: Absence of additional neurological deficits    Outcome: Progressing Towards Goal  Goal: *Dysphagia screen performed(Stroke Metric)  Outcome: Progressing Towards Goal     Problem: TIA/CVA Stroke: Day 2 Until Discharge  Goal: Activity/Safety  Outcome: Progressing Towards Goal  Goal: Diagnostic Test/Procedures  Outcome: Progressing Towards Goal  Goal: Nutrition/Diet  Outcome: Progressing Towards Goal  Goal: Discharge Planning  Outcome: Progressing Towards Goal  Goal: Medications  Outcome: Progressing Towards Goal  Goal: Respiratory  Outcome: Progressing Towards Goal  Goal: Treatments/Interventions/Procedures  Outcome: Progressing Towards Goal  Goal: *Absence of aspiration  Outcome: Progressing Towards Goal  Goal: *Absence of deep venous thrombosis signs and symptoms(Stroke Metric)  Outcome: Progressing Towards Goal  Goal: *Optimal pain control at patient's stated goal  Outcome: Progressing Towards Goal  Goal: *Tolerating diet  Outcome: Progressing Towards Goal  Goal: *Ability to perform ADLs and demonstrates progressive mobility and function  Outcome: Progressing Towards Goal     Problem: Ischemic Stroke: Discharge Outcomes  Goal: *Hemodynamically stable  Outcome: Progressing Towards Goal  Goal: *Absence of aspiration pneumonia  Outcome: Progressing Towards Goal  Goal: *Tolerating diet  Outcome: Progressing Towards Goal  Goal: *Ability to perform ADLs and demonstrates progressive mobility and function  Outcome: Progressing Towards Goal  Goal: *Absence of DVT(Stroke Metric)  Outcome: Progressing Towards Goal  Goal: *Absence of aspiration  Outcome: Progressing Towards Goal  Goal: *Optimal pain control at patient's stated goal  Outcome: Progressing Towards Goal  Goal: *Neurolgocially stable (absence of additional neurological deficits)  Outcome: Progressing Towards Goal

## 2021-06-14 NOTE — PROGRESS NOTES
[facility] Adult  Hospitalist Group     ICU Transfer/Accept Summary     [place] [address] [place]  DATE OF TRANSFER: 6/14/2021       PATIENT ID: Ritika Reed  MRN: [id number]   YOB: 1955    PRIMARY CARE PROVIDER: None   DATE OF ADMISSION: 6/12/2021  9:22 PM    ATTENDING PHYSICIAN: Kash Pratt NP  CONSULTATIONS:   IP CONSULT TO INTENSIVIST    PROCEDURES/SURGERIES:   * No surgery found *    REASON FOR ADMISSION: <principal problem not specified>     HOSPITAL PROBLEM LIST:  Patient Active Problem List   Diagnosis Code    Acute anterior wall MI ([facility] I21.09    Hyperlipidemia E78.5    Coronary atherosclerosis of native coronary artery I25.10    CVA (cerebral vascular accident) ([facility] I63.9    Obesity, morbid ([facility] E66.01    Impending cerebrovascular accident ([facility] I63.9    Long term (current) use of anticoagulants Z79.01    Ischemic stroke ([facility] I63.9         Brief HPI and Hospital Course:    Mr Jase Yi is a 75 yo male with a PMH of HLD, HTN, prior CVA (non-compliant with asa/coumadin), CAD, MI who presented to the ED on 6/12 with R hand numbness and expressive aphasia. CT head did not show hemorrhage, notable for remote R cerebellar infarct, possible L dense M2 segment. Teleneurology was consulted and TPA was given at 21:56 on 6/12. NIHSS 5 prior to TPA. CTA head/nect and CTP negative for major vessel occlusion/aneurysm/dissection. M4 posterior occlusion too distal for EVT per NIS. CTP notable for perfusion abnormality in the posterior L frontal lobe/L parietal lobe. No evidence of reversible ischemia. He was taken tot he ICU for further monitoring after TPA. Symptoms improved after TPA. NIHSS 1 after TPA, now 0.      06/14: Seen and examined patient sitting up in bed. AAOx3. MASON. States that he is feeling much better. All \"stroke symptoms\" have resolved. Wants to go home asap. No new complaints. Patient to transfer out of ICU.    Assessment and Plan:    Ischemic CVA: Distal L MCA occlusion not amenable to endovascular intervention per NIS, remote R cerebellar infarct, decreased perfusion L frontoparietal territory on CTP. Post 24h CT head w/o hemorrhage. MRI 6/13 with acute ischemia L temporal lobe and posterior frontal parietal deep white matter  - Aspirin 81mg/day  - Xarleto 20mg/day  - Dietary modifications for A1C 6.2  - Echo completed      HLD:   - Continue lipitor 40mg daily     HTN: Goal SBP <140  - Labetalol/cardene PRN    Morbid Obesity   - BMI 42.59  - Weight loss management to be followed up outpatient.               PHYSICAL EXAMINATION:  Visit Vitals  BP (!) 145/68   Pulse 66   Temp 97.7 °F (36.5 °C)   Resp 14   Ht 6' (1.829 m)   Wt 142.4 kg (314 lb)   SpO2 95%   BMI 42.59 kg/m²       General:          Alert, cooperative, no distress  HEENT:           Atraumatic, MMM            Neck:               Supple, symmetrical,  thyroid: non tender  Lungs:             Clear to auscultation bilaterally. No Wheezing or Rhonchi. No rales. Heart:              Regular  rhythm,  No  murmur   No edema  Abdomen:       Soft, non-tender. Not distended. Bowel sounds normal  Extremities:     No cyanosis. No clubbing,  +2 distal pulses  Skin:                Not pale. Not Jaundiced  No rashes   Psych:             Not anxious or agitated.   Neurologic:      Alert, moves all extremities, oriented X 3.     CODE STATUS:  X Full Code    DNR    Partial    Comfort Care     Signed:   Dell Lam NP  Date of Service:  6/14/2021  11:33 AM

## 2021-06-14 NOTE — PROGRESS NOTES
1930: Bedside and Verbal shift change report given to Darell Hernandez (oncoming nurse) by Chiquita Proctor (offgoing nurse). Report included the following information SBAR, Kardex, ED Summary, Procedure Summary, MAR, Recent Results and Cardiac Rhythm NSR/SB. 2000: Shift assessment completed. 0000: Reassessment completed. Spoke with Pablo Weir NP regarding the  frequency of patient's neuro checks. Okay for Q4 neuro checks at this time. 0400: Reassessment completed. 0730: Bedside and Verbal shift change report given to Ruben Hardy (oncoming nurse) by Darell Hernandez (offgoing nurse). Report included the following information SBAR, Kardex, ED Summary, Procedure Summary, MAR, Recent Results and Cardiac Rhythm NSR/SB.

## 2021-06-15 ENCOUNTER — TELEPHONE (OUTPATIENT)
Dept: CARDIOLOGY CLINIC | Age: 66
End: 2021-06-15

## 2021-06-15 DIAGNOSIS — I63.9 IMPENDING CEREBROVASCULAR ACCIDENT (HCC): Primary | ICD-10-CM

## 2021-06-15 DIAGNOSIS — I25.5 ISCHEMIC CARDIOMYOPATHY: ICD-10-CM

## 2021-06-15 NOTE — TELEPHONE ENCOUNTER
----- Message from Trish Andrea MD sent at 6/14/2021  5:20 PM EDT -----  Needs follow up with sherry as well as Limited echo with definity to assess EF and rule out Apical thrombus

## 2021-06-15 NOTE — TELEPHONE ENCOUNTER
Dr. Lee Ann Graf-  Will July be okay for limited echo with definity and f/u with you? Or do we need sooner?

## 2021-06-15 NOTE — TELEPHONE ENCOUNTER
Looks like he had significant stroke, so may go to rehab. So either next week or week after.  thanks

## 2021-06-15 NOTE — TELEPHONE ENCOUNTER
Called patient. Verified patient's identity with two identifiers. Scheduled limited echo with definity (time okay per Gladies Cadet) and f/u with Dr. Alexus Lam for next Thursday 6/24. Patient verbalized understanding and denied further questions or concerns.      Future Appointments   Date Time Provider Jack Lopez   6/24/2021 12:00 PM FARZANA LICEA   6/24/2021  1:00 PM MD YANNI Walker AMB

## 2021-06-24 ENCOUNTER — OFFICE VISIT (OUTPATIENT)
Dept: CARDIOLOGY CLINIC | Age: 66
End: 2021-06-24

## 2021-06-24 ENCOUNTER — TELEPHONE (OUTPATIENT)
Dept: CARDIOLOGY CLINIC | Age: 66
End: 2021-06-24

## 2021-06-24 ENCOUNTER — ANCILLARY PROCEDURE (OUTPATIENT)
Dept: CARDIOLOGY CLINIC | Age: 66
End: 2021-06-24

## 2021-06-24 VITALS
HEIGHT: 72 IN | DIASTOLIC BLOOD PRESSURE: 84 MMHG | SYSTOLIC BLOOD PRESSURE: 136 MMHG | BODY MASS INDEX: 41.04 KG/M2 | RESPIRATION RATE: 18 BRPM | OXYGEN SATURATION: 97 % | WEIGHT: 303 LBS | HEART RATE: 63 BPM

## 2021-06-24 VITALS
SYSTOLIC BLOOD PRESSURE: 124 MMHG | WEIGHT: 303 LBS | DIASTOLIC BLOOD PRESSURE: 86 MMHG | BODY MASS INDEX: 41.04 KG/M2 | HEIGHT: 72 IN

## 2021-06-24 DIAGNOSIS — I25.10 ATHEROSCLEROSIS OF NATIVE CORONARY ARTERY OF NATIVE HEART WITHOUT ANGINA PECTORIS: ICD-10-CM

## 2021-06-24 DIAGNOSIS — I25.5 ISCHEMIC CARDIOMYOPATHY: Primary | ICD-10-CM

## 2021-06-24 DIAGNOSIS — I63.9 ISCHEMIC STROKE (HCC): Primary | ICD-10-CM

## 2021-06-24 DIAGNOSIS — I50.9 CONGESTIVE HEART FAILURE, UNSPECIFIED HF CHRONICITY, UNSPECIFIED HEART FAILURE TYPE (HCC): ICD-10-CM

## 2021-06-24 DIAGNOSIS — I25.5 ISCHEMIC CARDIOMYOPATHY: ICD-10-CM

## 2021-06-24 DIAGNOSIS — I10 HYPERTENSION, UNSPECIFIED TYPE: ICD-10-CM

## 2021-06-24 DIAGNOSIS — E78.2 MIXED HYPERLIPIDEMIA: ICD-10-CM

## 2021-06-24 DIAGNOSIS — I63.9 IMPENDING CEREBROVASCULAR ACCIDENT (HCC): ICD-10-CM

## 2021-06-24 LAB
ECHO LV EDV A2C: 262.91 ML
ECHO LV EDV A4C: 296.53 ML
ECHO LV EDV BP: 284.95 ML (ref 67–155)
ECHO LV EDV INDEX A4C: 116.7 ML/M2
ECHO LV EDV INDEX BP: 112.2 ML/M2
ECHO LV EDV NDEX A2C: 103.5 ML/M2
ECHO LV EJECTION FRACTION A2C: 18 PERCENT
ECHO LV EJECTION FRACTION A4C: 25 PERCENT
ECHO LV EJECTION FRACTION BIPLANE: 21.1 PERCENT (ref 55–100)
ECHO LV ESV A2C: 216.45 ML
ECHO LV ESV A4C: 221.85 ML
ECHO LV ESV BP: 224.8 ML (ref 22–58)
ECHO LV ESV INDEX A2C: 85.2 ML/M2
ECHO LV ESV INDEX A4C: 87.3 ML/M2
ECHO LV ESV INDEX BP: 88.5 ML/M2
ECHO LV INTERNAL DIMENSION DIASTOLIC: 6.58 CM (ref 4.2–5.9)
ECHO LV INTERNAL DIMENSION SYSTOLIC: 5.74 CM
ECHO LV IVSD: 1.24 CM (ref 0.6–1)
ECHO LV MASS 2D: 417.8 G (ref 88–224)
ECHO LV MASS INDEX 2D: 164.5 G/M2 (ref 49–115)
ECHO LV POSTERIOR WALL DIASTOLIC: 1.41 CM (ref 0.6–1)

## 2021-06-24 PROCEDURE — 93308 TTE F-UP OR LMTD: CPT | Performed by: SPECIALIST

## 2021-06-24 PROCEDURE — 99214 OFFICE O/P EST MOD 30 MIN: CPT | Performed by: SPECIALIST

## 2021-06-24 RX ORDER — LISINOPRIL 10 MG/1
10 TABLET ORAL DAILY
Qty: 90 TABLET | Refills: 1 | Status: SHIPPED
Start: 2021-06-24 | End: 2021-06-25 | Stop reason: ALTCHOICE

## 2021-06-24 RX ORDER — CARVEDILOL 3.12 MG/1
3.12 TABLET ORAL 2 TIMES DAILY
Qty: 180 TABLET | Refills: 1 | Status: SHIPPED | OUTPATIENT
Start: 2021-06-24 | End: 2021-09-13 | Stop reason: SDUPTHER

## 2021-06-24 RX ORDER — SACUBITRIL AND VALSARTAN 24; 26 MG/1; MG/1
1 TABLET, FILM COATED ORAL 2 TIMES DAILY
Qty: 60 TABLET | Refills: 3 | Status: SHIPPED | OUTPATIENT
Start: 2021-06-24 | End: 2021-06-25 | Stop reason: SDUPTHER

## 2021-06-24 NOTE — PROGRESS NOTES
HISTORY OF PRESENT ILLNESS  Cuco Rodríguez is a 76 y.o. male     SUMMARY:   Problem List  Date Reviewed: 6/24/2021        Codes Class Noted    Ischemic stroke [facility]) ICD-10-CM: I63.9  ICD-9-CM: 434.91  6/12/2021        Impending cerebrovascular accident [facility]) ICD-10-CM: I63.9  ICD-9-CM: 435.9  2/8/2019        Long term (current) use of anticoagulants ICD-10-CM: Z79.01  ICD-9-CM: V58.61  2/8/2019        Obesity, morbid ([address] ICD-10-CM: E66.01  ICD-9-CM: 278.01  2/4/2019        CVA (cerebral vascular accident) [facility]) ICD-10-CM: I63.9  ICD-9-CM: 434.91  1/23/2019        Hyperlipidemia ICD-10-CM: E78.5  ICD-9-CM: 272.4  4/24/2012        Coronary atherosclerosis of native coronary artery ICD-10-CM: I25.10  ICD-9-CM: 414.01  4/24/2012        Acute anterior wall MI [facility]) ICD-10-CM: I21.09  ICD-9-CM: 410.10  4/10/2012    Overview Addendum 4/24/2012  3:40 PM by Birdie Baez MD     4/12 100% mid lad treated with single suly. 60% om1, 80% prox rca. Current Outpatient Medications on File Prior to Visit   Medication Sig    carvediloL (COREG) 3.125 mg tablet Take 1 Tablet by mouth two (2) times a day.  lisinopriL (PRINIVIL, ZESTRIL) 10 mg tablet Take 1 Tablet by mouth daily.  rivaroxaban (XARELTO) 20 mg tab tablet Take 1 Tablet by mouth daily.  aspirin 81 mg chewable tablet Take 1 Tab by mouth daily. No current facility-administered medications on file prior to visit. CARDIOLOGY STUDIES TO DATE:  PHI: Jade Moffett and Nadira Roberson  4/12 echo lvef 50% with mild anterior hypo  1/19 echo dilated lv with lvef 25-30%, lae, mild aortic stenosis  1/19 cardiac cath: LAD widely patent stent, no significant disease. LCX co-dominant, first marginal has multiple high grade lesions including at ostium. Ramus small with tight lesion, 2.0 vessel. RCA small with multiple lesions proximal and mid.  LV dilated with apical akinesis and severe global hypokinesis, EF 20%, LVEDP 40-45.  05/03/19  ECHO ADULT COMPLETE 05/06/2019 5/6/2019  Narrative  · Left Ventricle: Moderate-to-severe segmental systolic dysfunction. Calculated left ventricular ejection fraction is 35%. Biplane method used to measure ejection fraction. · The following segments are akinetic: mid anterior, mid anteroseptal, mid inferoseptal, apical anterior, apical septal, apical inferior, apical lateral and apex. The following segments are hypokinetic: basal inferior and mid inferior. All other segments are normal.· Left Atrium: Mildly dilated left atrium. · Right Ventricle: Mildly dilated right ventricle. · Normal wall thickness and global systolic function. · Right Atrium: Mildly dilated right atrium. · Aortic Valve: Aortic valve sclerosis. · Mitral Valve: Mild mitral annular calcification. Mild mitral valve regurgitation. Signed by: Zackery Sun MD    9/19 echo lvef 34%    Chief Complaint   Patient presents with    Follow-up     Echo     HPI :  After we last met he decided to stop all his medications. According to his wife is with him today is because he felt like it was taking all of his strength away and she said he looked landa. He did not inform us of this. He presented recently to [facility] with ischemic stroke which was probably embolic from his LV. His ejection fraction is declined and he may have an apical thrombus. He consistently has refused a defibrillator. He is now on Xarelto carvedilol and lisinopril and tolerating well thus far. He is trying to lose some weight. He is probably class III in terms of heart failure. CARDIAC ROS:   negative for chest pain, palpitations, syncope, orthopnea, paroxysmal nocturnal dyspnea, exertional chest pressure/discomfort, claudication, lower extremity edema    No family history on file.     Past Medical History:   Diagnosis Date    CAD (coronary artery disease)     stent, MI 4-10-12    Received intravenous tissue plasminogen activator (tPA) in emergency department     Stroke (cerebrum) [address] [address]        GENERAL ROS:  A comprehensive review of systems was negative except for that written in the HPI. Visit Vitals  /84 (BP 1 Location: Right arm, BP Patient Position: Sitting, BP Cuff Size: Adult)   Pulse 63   Resp 18   Ht 6' (1.829 m)   Wt 303 lb (137.4 kg)   SpO2 97%   BMI 41.09 kg/m²       Wt Readings from Last 3 Encounters:   06/24/21 303 lb (137.4 kg)   06/24/21 314 lb (142.4 kg)   06/14/21 314 lb (142.4 kg)            BP Readings from Last 3 Encounters:   06/24/21 136/84   06/24/21 124/86   06/14/21 125/86       PHYSICAL EXAM  General appearance: alert, cooperative, no distress, appears stated age  Neurologic: Alert and oriented X 3  Neck: supple, symmetrical, trachea midline, no adenopathy, no carotid bruit and no JVD  Lungs: clear to auscultation bilaterally  Heart: regular rate and rhythm, S1, S2 normal, no murmur, click, rub or gallop  Extremities: extremities normal, atraumatic, no cyanosis or edema    Lab Results   Component Value Date/Time    Cholesterol, total 168 06/13/2021 06:53 AM    Cholesterol, total 117 07/26/2019 10:24 AM    Cholesterol, total 191 01/23/2019 08:30 AM    Cholesterol, total 180 04/11/2012 04:18 AM    HDL Cholesterol 41 06/13/2021 06:53 AM    HDL Cholesterol 43 07/26/2019 10:24 AM    HDL Cholesterol 52 01/23/2019 08:30 AM    HDL Cholesterol 36 04/11/2012 04:18 AM    LDL, calculated 100.8 (H) 06/13/2021 06:53 AM    LDL, calculated 61 07/26/2019 10:24 AM    LDL, calculated 125 (H) 01/23/2019 08:30 AM    LDL, calculated 127 (H) 04/11/2012 04:18 AM    Triglyceride 131 06/13/2021 06:53 AM    Triglyceride 66 07/26/2019 10:24 AM    Triglyceride 70 01/23/2019 08:30 AM    Triglyceride 85 04/11/2012 04:18 AM    CHOL/HDL Ratio 4.1 06/13/2021 06:53 AM    CHOL/HDL Ratio 3.7 01/23/2019 08:30 AM    CHOL/HDL Ratio 5.0 04/11/2012 04:18 AM     ASSESSMENT :      He is stable and reasonably well compensated at this point.   I again tried emphasis the seriousness of his situation and the fact that things are getting worse. Perhaps that was because he stopped his medications. Currently he has really no income so working to see if we can get him patient assistance and switch him from lisinopril to [place] [place]. We will keep you on Xarelto indefinitely. At 1 point he was on spironolactone and maybe that was the medication that made him feel so bad so we will not retry that. At some point him to try to talk him into going back on a statin as he does have multivessel coronary disease. current treatment plan is effective, no change in therapy  lab results and schedule of future lab studies reviewed with patient  reviewed diet, exercise and weight control    Encounter Diagnoses   Name Primary?  Ischemic stroke ([facility] [address] Yes    Mixed hyperlipidemia     Ischemic cardiomyopathy     Atherosclerosis of native coronary artery of native heart without angina pectoris     Hypertension, unspecified type      No orders of the defined types were placed in this encounter. Follow-up and Dispositions    · Return in about 6 weeks (around 8/5/2021). Shey Reid MD  6/24/2021  Please note that this dictation was completed with [employer], the [employer] voice recognition software. Quite often unanticipated grammatical, syntax, homophones, and other interpretive errors are inadvertently transcribed by the computer software. Please disregard these errors. Please excuse any errors that have escaped final proofreading. Thank you.

## 2021-06-24 NOTE — TELEPHONE ENCOUNTER
Patient took forms to fill out for [facility] Patient Assistance and will bring back for us to fax. I did not know he did not have insurance. Can probably use co-pay card, but good to try both. Requested Prescriptions     Signed Prescriptions Disp Refills    sacubitriL-valsartan (Entresto) 24-26 mg tablet 60 Tablet 3     Sig: Take 1 Tablet by mouth two (2) times a day.      Authorizing Provider: Candido Wright     Ordering User: Parish Powers    Per Dr. Francis Henriquez verbal orders     Printed rx to send with forms

## 2021-06-25 RX ORDER — SACUBITRIL AND VALSARTAN 24; 26 MG/1; MG/1
1 TABLET, FILM COATED ORAL 2 TIMES DAILY
Qty: 60 TABLET | Refills: 5 | Status: SHIPPED | OUTPATIENT
Start: 2021-06-25 | End: 2021-06-29 | Stop reason: SDUPTHER

## 2021-06-25 NOTE — TELEPHONE ENCOUNTER
Called patient. Verified patient's identity with two identifiers. Advised we have co-pay cards for him to try when he drops off forms. Patient verbalized understanding and denied further questions or concerns.

## 2021-06-29 ENCOUNTER — TELEPHONE (OUTPATIENT)
Dept: CARDIAC REHAB | Age: 66
End: 2021-06-29

## 2021-06-29 DIAGNOSIS — I25.5 ISCHEMIC CARDIOMYOPATHY: ICD-10-CM

## 2021-06-29 RX ORDER — SACUBITRIL AND VALSARTAN 24; 26 MG/1; MG/1
1 TABLET, FILM COATED ORAL 2 TIMES DAILY
Qty: 60 TABLET | Refills: 5 | Status: SHIPPED | OUTPATIENT
Start: 2021-06-29 | End: 2021-08-16 | Stop reason: SDUPTHER

## 2021-06-29 NOTE — TELEPHONE ENCOUNTER
Patient came in office. He dropped off Patient Assistance forms and picked up printed entresto rx and co-pay cards. Went over instructions on how he will need to hold lisinopril 3 days prior to starting it if able to get it. Faxed Patient Assisance forms. Requested Prescriptions     Signed Prescriptions Disp Refills    sacubitriL-valsartan (Entresto) 24-26 mg tablet 60 Tablet 5     Sig: Take 1 Tablet by mouth two (2) times a day.  STOP LISINOPRIL 3 DAYS THEN START ENTRESTO     Authorizing Provider: Cecil Rubio     Ordering User: Dhaval Reza    Per Dr. Em Dodge verbal orders

## 2021-06-29 NOTE — TELEPHONE ENCOUNTER
Cardiac Rehab: Pt walked into cardiac rehab to make intake appointment for HF diagnosis with paper referral from Dr. Karen Orellana for cardiac rehab. Referral is also signed in [facility]. Intake made for 8/5/2021 at 9 am and pt given intake packet of forms and questionnaires to fill out prior to intake along with instructions to wear comfortable clothes, bring a list of all current medications, his schedule/calendar. Answered all questions.

## 2021-08-03 PROBLEM — I63.9 CVA (CEREBRAL VASCULAR ACCIDENT) (HCC): Status: RESOLVED | Noted: 2019-01-23 | Resolved: 2021-08-03

## 2021-08-05 ENCOUNTER — TELEPHONE (OUTPATIENT)
Dept: CARDIAC REHAB | Age: 66
End: 2021-08-05

## 2021-08-09 ENCOUNTER — TELEPHONE (OUTPATIENT)
Dept: CARDIAC REHAB | Age: 66
End: 2021-08-09

## 2021-08-09 NOTE — TELEPHONE ENCOUNTER
I spoke with Tang Metcalf and confirmed his new patient appointment at Cardiac Rehab for this Thursday, 8/12/21 at 2:30pm.  I reminded him to eat prior to the appointment and take his medications as prescribed.   Clem Martinez RN

## 2021-08-12 ENCOUNTER — HOSPITAL ENCOUNTER (OUTPATIENT)
Dept: CARDIAC REHAB | Age: 66
Discharge: HOME | End: 2021-08-12

## 2021-08-12 VITALS — BODY MASS INDEX: 39.1 KG/M2 | HEIGHT: 73 IN | WEIGHT: 295 LBS

## 2021-08-12 PROCEDURE — 93797 PHYS/QHP OP CAR RHAB WO ECG: CPT

## 2021-08-12 PROCEDURE — 93798 PHYS/QHP OP CAR RHAB W/ECG: CPT

## 2021-08-12 NOTE — CARDIO/PULMONARY
Melisa Ramos  78 y.o. presented to cardiac wellness for orientation and exercise tolerance test today with a primary diagnosis of HF. His EF is 20 - 25%. Melisa Ramos has a history of MI & Stent 2012 (attended our program at that time), HTN and CVA 1/2019. Cardiac risk factors include family history, dyslipidemia, obesity, hypertension and these were reviewed with Maylon Mohs. Melisa Ramos lives with his supprotive wife and her 80year old mother. He is not currently working. PHQ9, depression score, is 1 and this is considered mild. He denies devin depression or anxiety. Patient denied chest pain or SOB during 6 minute walk and was in SR IVCD w/o ectopy. Melisa Ramos will attend a 60 minute class once a week and will exercise 2 times weekly in cardiac wellness. An education manual was given to the patient and reviewed briefly. We will also address target guidelines for heart failure at his CR visits.     Peter Guevara RN  8/12/2021

## 2021-08-16 ENCOUNTER — OFFICE VISIT (OUTPATIENT)
Dept: CARDIOLOGY CLINIC | Age: 66
End: 2021-08-16

## 2021-08-16 VITALS
HEART RATE: 80 BPM | HEIGHT: 73 IN | SYSTOLIC BLOOD PRESSURE: 128 MMHG | BODY MASS INDEX: 38.43 KG/M2 | OXYGEN SATURATION: 96 % | DIASTOLIC BLOOD PRESSURE: 80 MMHG | RESPIRATION RATE: 18 BRPM | WEIGHT: 290 LBS

## 2021-08-16 DIAGNOSIS — I25.5 ISCHEMIC CARDIOMYOPATHY: ICD-10-CM

## 2021-08-16 DIAGNOSIS — E78.2 MIXED HYPERLIPIDEMIA: ICD-10-CM

## 2021-08-16 DIAGNOSIS — E66.01 OBESITY, MORBID (HCC): ICD-10-CM

## 2021-08-16 DIAGNOSIS — I10 HYPERTENSION, UNSPECIFIED TYPE: ICD-10-CM

## 2021-08-16 DIAGNOSIS — Z79.01 LONG TERM (CURRENT) USE OF ANTICOAGULANTS: ICD-10-CM

## 2021-08-16 DIAGNOSIS — I25.10 ATHEROSCLEROSIS OF NATIVE CORONARY ARTERY OF NATIVE HEART WITHOUT ANGINA PECTORIS: Primary | ICD-10-CM

## 2021-08-16 PROCEDURE — 99213 OFFICE O/P EST LOW 20 MIN: CPT | Performed by: SPECIALIST

## 2021-08-16 RX ORDER — ZINC GLUCONATE 100 MG
400 TABLET ORAL DAILY
COMMUNITY

## 2021-08-16 RX ORDER — HAWTHORN 500 MG
250 CAPSULE ORAL
COMMUNITY

## 2021-08-16 RX ORDER — BUTALB/ACETAMINOPHEN/CAFFEINE 50-325-40
TABLET ORAL
COMMUNITY

## 2021-08-16 RX ORDER — ATORVASTATIN CALCIUM 40 MG/1
40 TABLET, FILM COATED ORAL DAILY
COMMUNITY
End: 2021-09-13 | Stop reason: SDUPTHER

## 2021-08-16 RX ORDER — ASCORBIC ACID 500 MG
500 TABLET ORAL DAILY
COMMUNITY

## 2021-08-16 RX ORDER — AMPICILLIN TRIHYDRATE 250 MG
600 CAPSULE ORAL
COMMUNITY

## 2021-08-16 RX ORDER — SPIRONOLACTONE 25 MG/1
25 TABLET ORAL DAILY
COMMUNITY
End: 2021-09-13 | Stop reason: SDUPTHER

## 2021-08-16 RX ORDER — MULTIVIT WITH MINERALS/HERBS
1 TABLET ORAL DAILY
COMMUNITY

## 2021-08-16 RX ORDER — WARFARIN SODIUM 5 MG/1
5 TABLET ORAL DAILY
COMMUNITY
End: 2021-09-13 | Stop reason: ALTCHOICE

## 2021-08-16 RX ORDER — AA/PROT/LYSINE/METHIO/VIT C/B6 50-12.5 MG
TABLET ORAL
COMMUNITY

## 2021-08-16 RX ORDER — SACUBITRIL AND VALSARTAN 24; 26 MG/1; MG/1
1 TABLET, FILM COATED ORAL 2 TIMES DAILY
Qty: 28 TABLET | Refills: 0 | Status: SHIPPED | COMMUNITY
Start: 2021-08-16 | End: 2021-09-13 | Stop reason: SDUPTHER

## 2021-08-16 RX ORDER — MAGNESIUM 200 MG
TABLET ORAL
COMMUNITY

## 2021-08-16 NOTE — PROGRESS NOTES
HISTORY OF PRESENT ILLNESS  Lolly Rodriguez is a 78 y.o. male     SUMMARY:   Problem List  Date Reviewed: 8/16/2021        Codes Class Noted    Ischemic stroke [facility]) ICD-10-CM: I63.9  ICD-9-CM: 434.91  6/12/2021        Impending cerebrovascular accident [facility]) ICD-10-CM: I63.9  ICD-9-CM: 435.9  2/8/2019        Long term (current) use of anticoagulants ICD-10-CM: Z79.01  ICD-9-CM: V58.61  2/8/2019        Obesity, morbid ([address] ICD-10-CM: E66.01  ICD-9-CM: 278.01  2/4/2019        Hyperlipidemia ICD-10-CM: E78.5  ICD-9-CM: 272.4  4/24/2012        Coronary atherosclerosis of native coronary artery ICD-10-CM: I25.10  ICD-9-CM: 414.01  4/24/2012        Acute anterior wall MI [facility]) ICD-10-CM: I21.09  ICD-9-CM: 410.10  4/10/2012    Overview Addendum 4/24/2012  3:40 PM by Halima Mistry MD     4/12 100% mid lad treated with single suly. 60% om1, 80% prox rca. Current Outpatient Medications on File Prior to Visit   Medication Sig    atorvastatin (LIPITOR) 40 mg tablet Take 40 mg by mouth daily.  warfarin (Jantoven) 5 mg tablet Take 5 mg by mouth daily.  spironolactone (ALDACTONE) 25 mg tablet Take 25 mg by mouth daily.  vit A-vit D3-vit E-vit K 2,000 unit-2000 unit-1,000 mcg cap Take  by mouth.  calcium carbonate/vitamin D3 (CALCIUM 500 + D PO) Take  by mouth.  magnesium 200 mg tab Take  by mouth.  FOLIC ACID PO Take  by mouth. 400 mgs    red yeast rice extract 600 mg cap Take 600 mg by mouth now.  garlic 271 mg tab Take  by mouth.  vitamin k 100 mcg tablet Take 400 mcg by mouth daily.  coenzyme q10 10 mg cap Take  by mouth.  potassium 99 mg tablet Take 99 mg by mouth daily.  ascorbic acid, vitamin C, (Vitamin C) 500 mg tablet Take 500 mg by mouth daily.  b complex vitamins tablet Take 1 Tablet by mouth daily.  hawthorn (hawthorn berry) 500 mg cap Take 250 mg by mouth.     sacubitriL-valsartan (Entresto) 24-26 mg tablet Take 1 Tablet by mouth two (2) times a day. STOP LISINOPRIL 3 DAYS THEN START ENTRESTO    carvediloL (COREG) 3.125 mg tablet Take 1 Tablet by mouth two (2) times a day. (Patient taking differently: Take 3.125 mg by mouth daily.)    rivaroxaban (XARELTO) 20 mg tab tablet Take 1 Tablet by mouth daily.  aspirin 81 mg chewable tablet Take 1 Tab by mouth daily. No current facility-administered medications on file prior to visit. CARDIOLOGY STUDIES TO DATE:  PHI: Ivory Newellgladys and Gmshavonne Edwinnicolle  4/12 echo lvef 50% with mild anterior hypo  1/19 echo dilated lv with lvef 25-30%, lae, mild aortic stenosis  1/19 cardiac cath: LAD widely patent stent, no significant disease. LCX co-dominant, first marginal has multiple high grade lesions including at ostium. Ramus small with tight lesion, 2.0 vessel. RCA small with multiple lesions proximal and mid. LV dilated with apical akinesis and severe global hypokinesis, EF 20%, LVEDP 40-45.  05/03/19  ECHO ADULT COMPLETE 05/06/2019 5/6/2019  Narrative  · Left Ventricle: Moderate-to-severe segmental systolic dysfunction. Calculated left ventricular ejection fraction is 35%. Biplane method used to measure ejection fraction. · The following segments are akinetic: mid anterior, mid anteroseptal, mid inferoseptal, apical anterior, apical septal, apical inferior, apical lateral and apex. The following segments are hypokinetic: basal inferior and mid inferior. All other segments are normal.· Left Atrium: Mildly dilated left atrium. · Right Ventricle: Mildly dilated right ventricle. · Normal wall thickness and global systolic function. · Right Atrium: Mildly dilated right atrium. · Aortic Valve: Aortic valve sclerosis. · Mitral Valve: Mild mitral annular calcification. Mild mitral valve regurgitation. Signed by: Danley Hammans, MD    9/19 echo lvef 34%    Chief Complaint   Patient presents with    Follow-up     HPI :  He is doing remarkably well.   He still not back to work but he just did a big job and is hoping to get it. He is having great difficulty affording his meds were still trying to work with him with patient assistance. He is probably class II in terms of heart failure. CARDIAC ROS:   negative for chest pain, palpitations, syncope, orthopnea, paroxysmal nocturnal dyspnea, exertional chest pressure/discomfort, claudication, lower extremity edema    Family History   Problem Relation Age of Onset    Alzheimer Mother     Heart Disease Father        Past Medical History:   Diagnosis Date    CAD (coronary artery disease)     stent, MI 4-10-12    Heart failure ([facility] [address] 06/12/2021    EF 20 - 25 %    Hypertension     Received intravenous tissue plasminogen activator (tPA) in emergency department     [facility]    RT hand numbness and expressive aphasia       GENERAL ROS:  A comprehensive review of systems was negative except for that written in the HPI.     Visit Vitals  /80 (BP 1 Location: Left upper arm, BP Patient Position: Sitting, BP Cuff Size: Adult)   Pulse 80   Resp 18   Ht 6' 1\" (1.854 m)   Wt 290 lb (131.5 kg)   SpO2 96%   BMI 38.26 kg/m²       Wt Readings from Last 3 Encounters:   08/16/21 290 lb (131.5 kg)   08/12/21 295 lb (133.8 kg)   06/24/21 303 lb (137.4 kg)            BP Readings from Last 3 Encounters:   08/16/21 128/80   06/24/21 136/84   06/24/21 124/86       PHYSICAL EXAM  General appearance: alert, cooperative, no distress, appears stated age  Neck: supple, symmetrical, trachea midline, no adenopathy, no carotid bruit and no JVD  Lungs: clear to auscultation bilaterally  Heart: regular rate and rhythm, S1, S2 normal, no murmur, click, rub or gallop  Extremities: extremities normal, atraumatic, no cyanosis or edema    Lab Results   Component Value Date/Time    Cholesterol, total 168 06/13/2021 06:53 AM    Cholesterol, total 117 07/26/2019 10:24 AM    Cholesterol, total 191 01/23/2019 08:30 AM    Cholesterol, total 180 04/11/2012 04:18 AM    HDL Cholesterol 41 06/13/2021 06:53 AM    HDL Cholesterol 43 07/26/2019 10:24 AM    HDL Cholesterol 52 01/23/2019 08:30 AM    HDL Cholesterol 36 04/11/2012 04:18 AM    LDL, calculated 100.8 (H) 06/13/2021 06:53 AM    LDL, calculated 61 07/26/2019 10:24 AM    LDL, calculated 125 (H) 01/23/2019 08:30 AM    LDL, calculated 127 (H) 04/11/2012 04:18 AM    Triglyceride 131 06/13/2021 06:53 AM    Triglyceride 66 07/26/2019 10:24 AM    Triglyceride 70 01/23/2019 08:30 AM    Triglyceride 85 04/11/2012 04:18 AM    CHOL/HDL Ratio 4.1 06/13/2021 06:53 AM    CHOL/HDL Ratio 3.7 01/23/2019 08:30 AM    CHOL/HDL Ratio 5.0 04/11/2012 04:18 AM     ASSESSMENT :      He is stable and minimally symptomatic at this point on a reasonable medical regimen. Again working to try to get him on the meds he needs 1 where the other and then start to uptitrate his Entresto. He needs no further cardiac testing at this time. current treatment plan is effective, no change in therapy  lab results and schedule of future lab studies reviewed with patient  reviewed diet, exercise and weight control    Encounter Diagnoses   Name Primary?     Atherosclerosis of native coronary artery of native heart without angina pectoris Yes    Mixed hyperlipidemia     Long term (current) use of anticoagulants     Hypertension, unspecified type     Obesity, morbid ([facility] [address]     Ischemic cardiomyopathy      Orders Placed This Encounter    atorvastatin (LIPITOR) 40 mg tablet    warfarin (Jantoven) 5 mg tablet    spironolactone (ALDACTONE) 25 mg tablet    vit A-vit D3-vit E-vit K 2,000 unit-2000 unit-1,000 mcg cap    calcium carbonate/vitamin D3 (CALCIUM 500 + D PO)    magnesium 381 mg tab    FOLIC ACID PO    red yeast rice extract 617 mg cap    garlic 064 mg tab    vitamin k 100 mcg tablet    coenzyme q10 10 mg cap    potassium 99 mg tablet    ascorbic acid, vitamin C, (Vitamin C) 500 mg tablet    b complex vitamins tablet    hawthorn (hawthorn berry) 500 mg cap       Follow-up and Dispositions    · Return in about 3 months (around 11/16/2021). [address] [facility] [address], [place]  8/16/2021  Please note that this dictation was completed with [employer], the computer voice recognition software. Quite often unanticipated grammatical, syntax, homophones, and other interpretive errors are inadvertently transcribed by the computer software. Please disregard these errors. Please excuse any errors that have escaped final proofreading. Thank you.

## 2021-08-17 ENCOUNTER — HOSPITAL ENCOUNTER (OUTPATIENT)
Dept: CARDIAC REHAB | Age: 66
Discharge: HOME | End: 2021-08-17

## 2021-08-17 VITALS — WEIGHT: 291 LBS | BODY MASS INDEX: 38.39 KG/M2

## 2021-08-17 PROCEDURE — 93798 PHYS/QHP OP CAR RHAB W/ECG: CPT

## 2021-08-19 ENCOUNTER — HOSPITAL ENCOUNTER (OUTPATIENT)
Dept: CARDIAC REHAB | Age: 66
Discharge: HOME | End: 2021-08-19

## 2021-08-19 VITALS — BODY MASS INDEX: 38.79 KG/M2 | WEIGHT: 294 LBS

## 2021-08-19 PROCEDURE — 93798 PHYS/QHP OP CAR RHAB W/ECG: CPT

## 2021-08-19 PROCEDURE — 93797 PHYS/QHP OP CAR RHAB WO ECG: CPT

## 2021-08-24 ENCOUNTER — HOSPITAL ENCOUNTER (OUTPATIENT)
Dept: CARDIAC REHAB | Age: 66
Discharge: HOME | End: 2021-08-24

## 2021-08-24 VITALS — BODY MASS INDEX: 38.52 KG/M2 | WEIGHT: 292 LBS

## 2021-08-24 PROCEDURE — 93798 PHYS/QHP OP CAR RHAB W/ECG: CPT

## 2021-08-26 ENCOUNTER — APPOINTMENT (OUTPATIENT)
Dept: CARDIAC REHAB | Age: 66
End: 2021-08-26

## 2021-08-31 ENCOUNTER — HOSPITAL ENCOUNTER (OUTPATIENT)
Dept: CARDIAC REHAB | Age: 66
End: 2021-08-31

## 2021-08-31 ENCOUNTER — HOSPITAL ENCOUNTER (OUTPATIENT)
Dept: CARDIAC REHAB | Age: 66
Discharge: HOME | End: 2021-08-31

## 2021-08-31 VITALS — WEIGHT: 290 LBS | BODY MASS INDEX: 38.26 KG/M2

## 2021-08-31 PROCEDURE — 93798 PHYS/QHP OP CAR RHAB W/ECG: CPT

## 2021-09-02 ENCOUNTER — APPOINTMENT (OUTPATIENT)
Dept: CARDIAC REHAB | Age: 66
End: 2021-09-02

## 2021-09-07 ENCOUNTER — HOSPITAL ENCOUNTER (OUTPATIENT)
Dept: CARDIAC REHAB | Age: 66
Discharge: HOME | End: 2021-09-07

## 2021-09-07 VITALS — BODY MASS INDEX: 38.26 KG/M2 | WEIGHT: 290 LBS

## 2021-09-07 PROCEDURE — 93798 PHYS/QHP OP CAR RHAB W/ECG: CPT

## 2021-09-09 ENCOUNTER — APPOINTMENT (OUTPATIENT)
Dept: CARDIAC REHAB | Age: 66
End: 2021-09-09

## 2021-09-13 ENCOUNTER — APPOINTMENT (OUTPATIENT)
Dept: CARDIAC REHAB | Age: 66
End: 2021-09-13

## 2021-09-13 ENCOUNTER — TELEPHONE (OUTPATIENT)
Dept: CARDIOLOGY CLINIC | Age: 66
End: 2021-09-13

## 2021-09-13 DIAGNOSIS — I25.10 ATHEROSCLEROSIS OF NATIVE CORONARY ARTERY OF NATIVE HEART WITHOUT ANGINA PECTORIS: ICD-10-CM

## 2021-09-13 DIAGNOSIS — I63.9 ISCHEMIC STROKE (HCC): ICD-10-CM

## 2021-09-13 DIAGNOSIS — E78.2 MIXED HYPERLIPIDEMIA: ICD-10-CM

## 2021-09-13 DIAGNOSIS — I10 HYPERTENSION, UNSPECIFIED TYPE: ICD-10-CM

## 2021-09-13 DIAGNOSIS — I25.5 ISCHEMIC CARDIOMYOPATHY: ICD-10-CM

## 2021-09-13 RX ORDER — CARVEDILOL 3.12 MG/1
3.12 TABLET ORAL 2 TIMES DAILY
Qty: 180 TABLET | Refills: 0 | Status: SHIPPED | OUTPATIENT
Start: 2021-09-13 | End: 2021-11-18 | Stop reason: SDUPTHER

## 2021-09-13 RX ORDER — SPIRONOLACTONE 25 MG/1
25 TABLET ORAL DAILY
Qty: 90 TABLET | Refills: 0 | Status: SHIPPED | OUTPATIENT
Start: 2021-09-13 | End: 2021-11-18 | Stop reason: SDUPTHER

## 2021-09-13 RX ORDER — ATORVASTATIN CALCIUM 40 MG/1
40 TABLET, FILM COATED ORAL DAILY
Qty: 90 TABLET | Refills: 0 | Status: SHIPPED | OUTPATIENT
Start: 2021-09-13 | End: 2021-11-18 | Stop reason: SDUPTHER

## 2021-09-13 RX ORDER — SACUBITRIL AND VALSARTAN 24; 26 MG/1; MG/1
1 TABLET, FILM COATED ORAL 2 TIMES DAILY
Qty: 60 TABLET | Refills: 0 | Status: SHIPPED | OUTPATIENT
Start: 2021-09-13 | End: 2021-12-20 | Stop reason: DRUGHIGH

## 2021-09-13 NOTE — TELEPHONE ENCOUNTER
Pt's wife walked into office for printed prescription. Verified patient's identity with two identifiers and HIPAA. Requested Prescriptions     Signed Prescriptions Disp Refills    atorvastatin (LIPITOR) 40 mg tablet 90 Tablet 0     Sig: Take 1 Tablet by mouth daily. Authorizing Provider: Jennifer Brooks     Ordering User: Syd Duff spironolactone (ALDACTONE) 25 mg tablet 90 Tablet 0     Sig: Take 1 Tablet by mouth daily. Authorizing Provider: Jennifer Brooks     Ordering User: Syd Duff sacubitriL-valsartan Delio Rushing) 24-26 mg tablet 60 Tablet 0     Sig: Take 1 Tablet by mouth two (2) times a day. STOP LISINOPRIL 3 DAYS THEN START ENTRESTO     Authorizing Provider: Jennifer Brooks     Ordering User: Juice Laguna    carvediloL (COREG) 3.125 mg tablet 180 Tablet 0     Sig: Take 1 Tablet by mouth two (2) times a day. Authorizing Provider: Jennifer Alfred User: Syd Duff rivaroxaban (XARELTO) 20 mg tab tablet 90 Tablet 0     Sig: Take 1 Tablet by mouth daily.      Authorizing Provider: Jennifer Brooks     Ordering User: Juice Laguna    Per Dr. Hayden Lanes verbal orders

## 2021-09-13 NOTE — TELEPHONE ENCOUNTER
Patient called because he is trying to get a refill on his prescriptions because he is out of them completely and would like a script and not to be called in. Wife would like to pick them up so she can take them to him out of town. Please give a call back to his wife.     Phone Joaquinvegasara Reyna [phone]

## 2021-09-13 NOTE — TELEPHONE ENCOUNTER
Called # below. LM on VM. I stated they can call with the specific med(s) pt needs refill for or can call back with pharmacy phone # and I can call prescriptions in.

## 2021-09-14 ENCOUNTER — APPOINTMENT (OUTPATIENT)
Dept: CARDIAC REHAB | Age: 66
End: 2021-09-14

## 2021-09-16 ENCOUNTER — APPOINTMENT (OUTPATIENT)
Dept: CARDIAC REHAB | Age: 66
End: 2021-09-16

## 2021-09-21 ENCOUNTER — TELEPHONE (OUTPATIENT)
Dept: CARDIAC REHAB | Age: 66
End: 2021-09-21

## 2021-09-21 ENCOUNTER — APPOINTMENT (OUTPATIENT)
Dept: CARDIAC REHAB | Age: 66
End: 2021-09-21

## 2021-09-21 NOTE — TELEPHONE ENCOUNTER
9/21/2021 Cardiac Wellness: Mr. Deloris Charlton called to cancel today's appointment d/t out of town for work. Will return for next appointment.  Noble Rodriguez

## 2021-09-23 ENCOUNTER — APPOINTMENT (OUTPATIENT)
Dept: CARDIAC REHAB | Age: 66
End: 2021-09-23

## 2021-09-28 ENCOUNTER — APPOINTMENT (OUTPATIENT)
Dept: CARDIAC REHAB | Age: 66
End: 2021-09-28

## 2021-09-28 ENCOUNTER — TELEPHONE (OUTPATIENT)
Dept: CARDIAC REHAB | Age: 66
End: 2021-09-28

## 2021-09-28 NOTE — TELEPHONE ENCOUNTER
9/28/2021 Cardiac Wellness: Mr. Rufina Holder called, left voicemail message after hours to cancel today's appointment d/t working out of town. Will return for next appointment.  Sally Singh

## 2021-09-30 ENCOUNTER — APPOINTMENT (OUTPATIENT)
Dept: CARDIAC REHAB | Age: 66
End: 2021-09-30

## 2021-10-05 ENCOUNTER — TELEPHONE (OUTPATIENT)
Dept: CARDIAC REHAB | Age: 66
End: 2021-10-05

## 2021-10-05 NOTE — TELEPHONE ENCOUNTER
Cardiac Rehab: Laurita Hanna to inquire about his missed exercise session this morning. Left voicemail message asking for return call back.  Marva Bedolla RN

## 2021-10-07 ENCOUNTER — TELEPHONE (OUTPATIENT)
Dept: CARDIAC REHAB | Age: 66
End: 2021-10-07

## 2021-10-07 NOTE — TELEPHONE ENCOUNTER
I spoke with Obie Talbot today since he did not come to cardiac rehab. He is busy with a work project and has agreed to call us next week to inform us about his participation intentions at cardiac rehab and to make future appointments.   Michelle Varghese RN

## 2021-10-12 ENCOUNTER — APPOINTMENT (OUTPATIENT)
Dept: CARDIAC REHAB | Age: 66
End: 2021-10-12

## 2021-10-14 ENCOUNTER — APPOINTMENT (OUTPATIENT)
Dept: CARDIAC REHAB | Age: 66
End: 2021-10-14

## 2021-10-19 ENCOUNTER — APPOINTMENT (OUTPATIENT)
Dept: CARDIAC REHAB | Age: 66
End: 2021-10-19

## 2021-10-21 ENCOUNTER — APPOINTMENT (OUTPATIENT)
Dept: CARDIAC REHAB | Age: 66
End: 2021-10-21

## 2021-10-26 ENCOUNTER — APPOINTMENT (OUTPATIENT)
Dept: CARDIAC REHAB | Age: 66
End: 2021-10-26

## 2021-10-28 ENCOUNTER — APPOINTMENT (OUTPATIENT)
Dept: CARDIAC REHAB | Age: 66
End: 2021-10-28

## 2021-11-02 ENCOUNTER — APPOINTMENT (OUTPATIENT)
Dept: CARDIAC REHAB | Age: 66
End: 2021-11-02

## 2021-11-04 ENCOUNTER — APPOINTMENT (OUTPATIENT)
Dept: CARDIAC REHAB | Age: 66
End: 2021-11-04

## 2021-11-04 NOTE — CARDIO/PULMONARY
Melisa Ramos  78 y.o. With a diagnosis of Heart Failure, Melisa Ramos attended Phase II Cardiac Rehab for 8 sessions from 8/5/21 - 9/7/21. He has been working out of town and had not been able to continue his attendance at cardiac rehab so he has been discharged from the program. (3 calls made). I spoke with him today to notify him.     Elen Izaguirre RN  11/4/2021

## 2021-11-09 ENCOUNTER — APPOINTMENT (OUTPATIENT)
Dept: CARDIAC REHAB | Age: 66
End: 2021-11-09

## 2021-11-11 ENCOUNTER — APPOINTMENT (OUTPATIENT)
Dept: CARDIAC REHAB | Age: 66
End: 2021-11-11

## 2021-11-16 ENCOUNTER — APPOINTMENT (OUTPATIENT)
Dept: CARDIAC REHAB | Age: 66
End: 2021-11-16

## 2021-11-18 ENCOUNTER — APPOINTMENT (OUTPATIENT)
Dept: CARDIAC REHAB | Age: 66
End: 2021-11-18

## 2021-11-18 ENCOUNTER — OFFICE VISIT (OUTPATIENT)
Dept: CARDIOLOGY CLINIC | Age: 66
End: 2021-11-18

## 2021-11-18 VITALS
WEIGHT: 294 LBS | HEIGHT: 73 IN | DIASTOLIC BLOOD PRESSURE: 84 MMHG | RESPIRATION RATE: 14 BRPM | BODY MASS INDEX: 38.97 KG/M2 | HEART RATE: 64 BPM | OXYGEN SATURATION: 97 % | SYSTOLIC BLOOD PRESSURE: 136 MMHG

## 2021-11-18 DIAGNOSIS — E78.2 MIXED HYPERLIPIDEMIA: ICD-10-CM

## 2021-11-18 DIAGNOSIS — Z79.01 LONG TERM (CURRENT) USE OF ANTICOAGULANTS: ICD-10-CM

## 2021-11-18 DIAGNOSIS — I63.9 ISCHEMIC STROKE (HCC): ICD-10-CM

## 2021-11-18 DIAGNOSIS — I10 HYPERTENSION, UNSPECIFIED TYPE: ICD-10-CM

## 2021-11-18 DIAGNOSIS — I25.10 ATHEROSCLEROSIS OF NATIVE CORONARY ARTERY OF NATIVE HEART WITHOUT ANGINA PECTORIS: ICD-10-CM

## 2021-11-18 DIAGNOSIS — E66.01 OBESITY, MORBID (HCC): ICD-10-CM

## 2021-11-18 DIAGNOSIS — I25.5 ISCHEMIC CARDIOMYOPATHY: Primary | ICD-10-CM

## 2021-11-18 PROCEDURE — 99213 OFFICE O/P EST LOW 20 MIN: CPT | Performed by: SPECIALIST

## 2021-11-18 RX ORDER — ATORVASTATIN CALCIUM 40 MG/1
40 TABLET, FILM COATED ORAL DAILY
Qty: 90 TABLET | Refills: 1 | Status: SHIPPED | OUTPATIENT
Start: 2021-11-18 | End: 2022-01-19 | Stop reason: SDUPTHER

## 2021-11-18 RX ORDER — SPIRONOLACTONE 25 MG/1
25 TABLET ORAL DAILY
Qty: 90 TABLET | Refills: 1 | Status: SHIPPED | OUTPATIENT
Start: 2021-11-18 | End: 2022-01-19 | Stop reason: SDUPTHER

## 2021-11-18 RX ORDER — SACUBITRIL AND VALSARTAN 49; 51 MG/1; MG/1
1 TABLET, FILM COATED ORAL 2 TIMES DAILY
Qty: 60 TABLET | Refills: 1 | Status: SHIPPED
Start: 2021-11-18 | End: 2021-12-20 | Stop reason: DRUGHIGH

## 2021-11-18 RX ORDER — CARVEDILOL 3.12 MG/1
3.12 TABLET ORAL 2 TIMES DAILY
Qty: 180 TABLET | Refills: 1 | Status: SHIPPED | OUTPATIENT
Start: 2021-11-18 | End: 2022-02-22 | Stop reason: SDUPTHER

## 2021-11-18 NOTE — PROGRESS NOTES
HISTORY OF PRESENT ILLNESS  Adrian Em is a 78 y.o. male     SUMMARY:   Problem List  Date Reviewed: 11/18/2021          Codes Class Noted    Ischemic stroke [facility]) ICD-10-CM: I63.9  ICD-9-CM: 434.91  6/12/2021        Impending cerebrovascular accident [facility]) ICD-10-CM: I63.9  ICD-9-CM: 435.9  2/8/2019        Long term (current) use of anticoagulants ICD-10-CM: Z79.01  ICD-9-CM: V58.61  2/8/2019        Obesity, morbid ([address] ICD-10-CM: E66.01  ICD-9-CM: 278.01  2/4/2019        Hyperlipidemia ICD-10-CM: E78.5  ICD-9-CM: 272.4  4/24/2012        Coronary atherosclerosis of native coronary artery ICD-10-CM: I25.10  ICD-9-CM: 414.01  4/24/2012        Acute anterior wall MI [facility]) ICD-10-CM: I21.09  ICD-9-CM: 410.10  4/10/2012    Overview Addendum 4/24/2012  3:40 PM by Yvtete Cottrell MD     4/12 100% mid lad treated with single suly. 60% om1, 80% prox rca. Current Outpatient Medications on File Prior to Visit   Medication Sig    atorvastatin (LIPITOR) 40 mg tablet Take 1 Tablet by mouth daily.  spironolactone (ALDACTONE) 25 mg tablet Take 1 Tablet by mouth daily.  sacubitriL-valsartan (Entresto) 24-26 mg tablet Take 1 Tablet by mouth two (2) times a day. STOP LISINOPRIL 3 DAYS THEN START ENTRESTO    carvediloL (COREG) 3.125 mg tablet Take 1 Tablet by mouth two (2) times a day.  rivaroxaban (XARELTO) 20 mg tab tablet Take 1 Tablet by mouth daily.  vit A-vit D3-vit E-vit K 2,000 unit-2000 unit-1,000 mcg cap Take  by mouth.  calcium carbonate/vitamin D3 (CALCIUM 500 + D PO) Take  by mouth.  magnesium 200 mg tab Take  by mouth.  FOLIC ACID PO Take  by mouth. 400 mgs    red yeast rice extract 600 mg cap Take 600 mg by mouth now.  garlic 615 mg tab Take  by mouth.  vitamin k 100 mcg tablet Take 400 mcg by mouth daily.  coenzyme q10 10 mg cap Take  by mouth.  potassium 99 mg tablet Take 99 mg by mouth daily.     ascorbic acid, vitamin C, (Vitamin C) 500 mg tablet Take 500 mg by mouth daily.  b complex vitamins tablet Take 1 Tablet by mouth daily.  hawthorn (hawthorn berry) 500 mg cap Take 250 mg by mouth.  aspirin 81 mg chewable tablet Take 1 Tab by mouth daily. No current facility-administered medications on file prior to visit. CARDIOLOGY STUDIES TO DATE:  PHI: Janett Vega and Demetria Pineda  4/12 echo lvef 50% with mild anterior hypo  1/19 echo dilated lv with lvef 25-30%, lae, mild aortic stenosis  1/19 cardiac cath: LAD widely patent stent, no significant disease. LCX co-dominant, first marginal has multiple high grade lesions including at ostium. Ramus small with tight lesion, 2.0 vessel. RCA small with multiple lesions proximal and mid. LV dilated with apical akinesis and severe global hypokinesis, EF 20%, LVEDP 40-45.  05/03/19 echo, lvef 35%· . Lennie, rve with preserved rvef, mild mr    9/19 echo lvef 34%    6/21 echo lvef 20-25%, ?small apical thrombus    Chief Complaint   Patient presents with    Follow-up     HPI :  He continues to do remarkably well and really is class I in terms of heart failure. He is not having any problems with his medications except for the expense and is still opposed to getting any sort of insurance.   CARDIAC ROS:   negative for chest pain, dyspnea, palpitations, syncope, orthopnea, paroxysmal nocturnal dyspnea, exertional chest pressure/discomfort, claudication, lower extremity edema    Family History   Problem Relation Age of Onset    Alzheimer's Disease Mother     Heart Disease Father        Past Medical History:   Diagnosis Date    CAD (coronary artery disease)     stent, MI 4-10-12    Heart failure [address] [address] 06/12/2021    EF 20 - 25 %    Hypertension     Received intravenous tissue plasminogen activator (tPA) in emergency department     Stroke (cerebrum) ([facility] [address] 01/2019    RT hand numbness and expressive aphasia       GENERAL ROS:  A comprehensive review of systems was negative except for that written in the HPI.    Visit Vitals  /84 (BP 1 Location: Right arm, BP Patient Position: Sitting, BP Cuff Size: Adult)   Pulse 64   Resp 14   Ht 6' 1\" (1.854 m)   Wt 294 lb (133.4 kg)   SpO2 97%   BMI 38.79 kg/m²       Wt Readings from Last 3 Encounters:   11/18/21 294 lb (133.4 kg)   09/07/21 290 lb (131.5 kg)   08/31/21 290 lb (131.5 kg)            BP Readings from Last 3 Encounters:   11/18/21 136/84   08/16/21 128/80   06/24/21 136/84       PHYSICAL EXAM  General appearance: alert, cooperative, no distress, appears stated age  Neurologic: Alert and oriented X 3  Neck: supple, symmetrical, trachea midline, no adenopathy, no carotid bruit and no JVD  Lungs: clear to auscultation bilaterally  Heart: regular rate and rhythm, S1, S2 normal, no murmur, click, rub or gallop  Extremities: extremities normal, atraumatic, no cyanosis or edema    Lab Results   Component Value Date/Time    Cholesterol, total 168 06/13/2021 06:53 AM    Cholesterol, total 117 07/26/2019 10:24 AM    Cholesterol, total 191 01/23/2019 08:30 AM    Cholesterol, total 180 04/11/2012 04:18 AM    HDL Cholesterol 41 06/13/2021 06:53 AM    HDL Cholesterol 43 07/26/2019 10:24 AM    HDL Cholesterol 52 01/23/2019 08:30 AM    HDL Cholesterol 36 04/11/2012 04:18 AM    LDL, calculated 100.8 (H) 06/13/2021 06:53 AM    LDL, calculated 61 07/26/2019 10:24 AM    LDL, calculated 125 (H) 01/23/2019 08:30 AM    LDL, calculated 127 (H) 04/11/2012 04:18 AM    Triglyceride 131 06/13/2021 06:53 AM    Triglyceride 66 07/26/2019 10:24 AM    Triglyceride 70 01/23/2019 08:30 AM    Triglyceride 85 04/11/2012 04:18 AM    CHOL/HDL Ratio 4.1 06/13/2021 06:53 AM    CHOL/HDL Ratio 3.7 01/23/2019 08:30 AM    CHOL/HDL Ratio 5.0 04/11/2012 04:18 AM     ASSESSMENT :      He is stable and asymptomatic at this point well compensated on a good medical regimen and needs no cardiac testing. Were going double his Entresto. I do not think we can push his Coreg given his low heart rate.   current treatment plan is effective, no change in therapy  lab results and schedule of future lab studies reviewed with patient  reviewed diet, exercise and weight control    Encounter Diagnoses   Name Primary?  Ischemic cardiomyopathy Yes    Atherosclerosis of native coronary artery of native heart without angina pectoris     Mixed hyperlipidemia     Obesity, morbid ([address]     Long term (current) use of anticoagulants      No orders of the defined types were placed in this encounter. Follow-up and Dispositions    · Return in about 3 months (around 2/18/2022). Ambika Ragland MD  11/18/2021  Please note that this dictation was completed with [employer], the [employer] voice recognition software. Quite often unanticipated grammatical, syntax, homophones, and other interpretive errors are inadvertently transcribed by the computer software. Please disregard these errors. Please excuse any errors that have escaped final proofreading. Thank you.

## 2021-11-23 ENCOUNTER — APPOINTMENT (OUTPATIENT)
Dept: CARDIAC REHAB | Age: 66
End: 2021-11-23

## 2021-12-20 ENCOUNTER — TELEPHONE (OUTPATIENT)
Dept: CARDIOLOGY CLINIC | Age: 66
End: 2021-12-20

## 2021-12-20 DIAGNOSIS — I63.9 ISCHEMIC STROKE (HCC): ICD-10-CM

## 2021-12-20 DIAGNOSIS — I10 HYPERTENSION, UNSPECIFIED TYPE: ICD-10-CM

## 2021-12-20 DIAGNOSIS — E66.01 OBESITY, MORBID (HCC): ICD-10-CM

## 2021-12-20 DIAGNOSIS — I25.10 ATHEROSCLEROSIS OF NATIVE CORONARY ARTERY OF NATIVE HEART WITHOUT ANGINA PECTORIS: ICD-10-CM

## 2021-12-20 DIAGNOSIS — E78.2 MIXED HYPERLIPIDEMIA: ICD-10-CM

## 2021-12-20 DIAGNOSIS — I25.5 ISCHEMIC CARDIOMYOPATHY: ICD-10-CM

## 2021-12-20 DIAGNOSIS — Z79.01 LONG TERM (CURRENT) USE OF ANTICOAGULANTS: ICD-10-CM

## 2021-12-20 RX ORDER — SACUBITRIL AND VALSARTAN 49; 51 MG/1; MG/1
1 TABLET, FILM COATED ORAL 2 TIMES DAILY
Qty: 60 TABLET | Refills: 1 | Status: SHIPPED | OUTPATIENT
Start: 2021-12-20 | End: 2021-12-20 | Stop reason: SDUPTHER

## 2021-12-20 RX ORDER — SACUBITRIL AND VALSARTAN 49; 51 MG/1; MG/1
1 TABLET, FILM COATED ORAL 2 TIMES DAILY
Qty: 28 TABLET | Refills: 0 | Status: SHIPPED | COMMUNITY
Start: 2021-12-20 | End: 2022-03-11 | Stop reason: DRUGHIGH

## 2021-12-20 RX ORDER — SACUBITRIL AND VALSARTAN 49; 51 MG/1; MG/1
1 TABLET, FILM COATED ORAL 2 TIMES DAILY
Qty: 60 TABLET | Refills: 1 | Status: CANCELLED | OUTPATIENT
Start: 2021-12-20

## 2021-12-20 NOTE — TELEPHONE ENCOUNTER
Pt walked in requesting entresto refills. Verified patient's identity with two identifiers. Samples given. Verified pt taking double lowest dose so will  medium dose and take only one tab bid. Also sent rx to pharmacy down the rd. Requested Prescriptions     Signed Prescriptions Disp Refills    sacubitriL-valsartan (Entresto) 49-51 mg tab tablet 60 Tablet 1     Sig: Take 1 Tablet by mouth two (2) times a day.  This is an increased dose     Authorizing Provider: Brisa Trevizo     Ordering User: Rachel Taveras    Per Dr. Jose Velasquez verbal orders     Samples:

## 2022-01-15 DIAGNOSIS — Z79.01 LONG TERM (CURRENT) USE OF ANTICOAGULANTS: ICD-10-CM

## 2022-01-15 DIAGNOSIS — E78.2 MIXED HYPERLIPIDEMIA: ICD-10-CM

## 2022-01-15 DIAGNOSIS — I10 HYPERTENSION, UNSPECIFIED TYPE: ICD-10-CM

## 2022-01-15 DIAGNOSIS — E66.01 OBESITY, MORBID (HCC): ICD-10-CM

## 2022-01-15 DIAGNOSIS — I63.9 ISCHEMIC STROKE (HCC): ICD-10-CM

## 2022-01-15 DIAGNOSIS — I25.5 ISCHEMIC CARDIOMYOPATHY: ICD-10-CM

## 2022-01-15 DIAGNOSIS — I25.10 ATHEROSCLEROSIS OF NATIVE CORONARY ARTERY OF NATIVE HEART WITHOUT ANGINA PECTORIS: ICD-10-CM

## 2022-01-18 RX ORDER — RIVAROXABAN 20 MG/1
TABLET, FILM COATED ORAL
Qty: 30 TABLET | Refills: 2 | Status: SHIPPED | OUTPATIENT
Start: 2022-01-18 | End: 2022-04-13

## 2022-01-18 NOTE — TELEPHONE ENCOUNTER
Requested Prescriptions     Signed Prescriptions Disp Refills    Xarelto 20 mg tab tablet 30 Tablet 2     Sig: TAKE 1 TABLET BY MOUTH DAILY     Authorizing Provider: Alec Reyes     Ordering User: Radha Eckert    Per Dr. Adriana Oates verbal orders

## 2022-01-19 DIAGNOSIS — E78.2 MIXED HYPERLIPIDEMIA: ICD-10-CM

## 2022-01-19 DIAGNOSIS — I63.9 ISCHEMIC STROKE (HCC): ICD-10-CM

## 2022-01-19 DIAGNOSIS — I25.5 ISCHEMIC CARDIOMYOPATHY: ICD-10-CM

## 2022-01-19 DIAGNOSIS — Z79.01 LONG TERM (CURRENT) USE OF ANTICOAGULANTS: ICD-10-CM

## 2022-01-19 DIAGNOSIS — I10 HYPERTENSION, UNSPECIFIED TYPE: ICD-10-CM

## 2022-01-19 DIAGNOSIS — I25.10 ATHEROSCLEROSIS OF NATIVE CORONARY ARTERY OF NATIVE HEART WITHOUT ANGINA PECTORIS: ICD-10-CM

## 2022-01-19 DIAGNOSIS — E66.01 OBESITY, MORBID (HCC): ICD-10-CM

## 2022-01-19 RX ORDER — ATORVASTATIN CALCIUM 40 MG/1
40 TABLET, FILM COATED ORAL DAILY
Qty: 90 TABLET | Refills: 0 | Status: SHIPPED | OUTPATIENT
Start: 2022-01-19 | End: 2022-05-25

## 2022-01-19 RX ORDER — SPIRONOLACTONE 25 MG/1
25 TABLET ORAL DAILY
Qty: 90 TABLET | Refills: 0 | Status: SHIPPED | OUTPATIENT
Start: 2022-01-19 | End: 2022-05-25

## 2022-02-22 DIAGNOSIS — I25.5 ISCHEMIC CARDIOMYOPATHY: ICD-10-CM

## 2022-02-22 DIAGNOSIS — E78.2 MIXED HYPERLIPIDEMIA: ICD-10-CM

## 2022-02-22 DIAGNOSIS — I10 HYPERTENSION, UNSPECIFIED TYPE: ICD-10-CM

## 2022-02-22 DIAGNOSIS — I25.10 ATHEROSCLEROSIS OF NATIVE CORONARY ARTERY OF NATIVE HEART WITHOUT ANGINA PECTORIS: ICD-10-CM

## 2022-02-22 DIAGNOSIS — Z79.01 LONG TERM (CURRENT) USE OF ANTICOAGULANTS: ICD-10-CM

## 2022-02-22 DIAGNOSIS — E66.01 OBESITY, MORBID (HCC): ICD-10-CM

## 2022-02-22 DIAGNOSIS — I63.9 ISCHEMIC STROKE (HCC): ICD-10-CM

## 2022-02-22 RX ORDER — CARVEDILOL 3.12 MG/1
3.12 TABLET ORAL 2 TIMES DAILY
Qty: 180 TABLET | Refills: 0 | Status: SHIPPED | OUTPATIENT
Start: 2022-02-22 | End: 2022-09-30 | Stop reason: SDUPTHER

## 2022-02-22 NOTE — TELEPHONE ENCOUNTER
Pt thought his appt was today, came in office, r/s'ed tomorrow's appt because he is going out of town. He requested a refill for carvedilol printed to take with him. Verified patient's identity with two identifiers. Dr. Eulalio Leiva signed rx. Patient verbalized understanding and denied further questions or concerns. Requested Prescriptions     Signed Prescriptions Disp Refills    carvediloL (COREG) 3.125 mg tablet 180 Tablet 0     Sig: Take 1 Tablet by mouth two (2) times a day.      Authorizing Provider: Bibiana Dailey     Ordering User: Miesha Beth    Per Dr. Gisella Christian [facility] Appointments   Date Time Provider Jack Lopez   3/11/2022  9:40 AM MD YANNI Hammond AMB

## 2022-03-11 ENCOUNTER — OFFICE VISIT (OUTPATIENT)
Dept: CARDIOLOGY CLINIC | Age: 67
End: 2022-03-11

## 2022-03-11 VITALS
OXYGEN SATURATION: 96 % | WEIGHT: 310 LBS | SYSTOLIC BLOOD PRESSURE: 122 MMHG | DIASTOLIC BLOOD PRESSURE: 88 MMHG | HEART RATE: 75 BPM | HEIGHT: 73 IN | BODY MASS INDEX: 41.08 KG/M2 | RESPIRATION RATE: 22 BRPM

## 2022-03-11 DIAGNOSIS — I25.5 ISCHEMIC CARDIOMYOPATHY: ICD-10-CM

## 2022-03-11 DIAGNOSIS — E78.2 MIXED HYPERLIPIDEMIA: ICD-10-CM

## 2022-03-11 DIAGNOSIS — Z79.01 LONG TERM (CURRENT) USE OF ANTICOAGULANTS: ICD-10-CM

## 2022-03-11 DIAGNOSIS — I25.10 ATHEROSCLEROSIS OF NATIVE CORONARY ARTERY OF NATIVE HEART WITHOUT ANGINA PECTORIS: Primary | ICD-10-CM

## 2022-03-11 PROCEDURE — 99213 OFFICE O/P EST LOW 20 MIN: CPT | Performed by: SPECIALIST

## 2022-03-11 RX ORDER — SACUBITRIL AND VALSARTAN 97; 103 MG/1; MG/1
1 TABLET, FILM COATED ORAL 2 TIMES DAILY
COMMUNITY

## 2022-03-11 NOTE — PROGRESS NOTES
HISTORY OF PRESENT ILLNESS  Rohan Galvan is a 78 y.o. male     SUMMARY:   Problem List  Date Reviewed: 3/11/2022          Codes Class Noted    Ischemic stroke [facility]) ICD-10-CM: I63.9  ICD-9-CM: 434.91  6/12/2021        Impending cerebrovascular accident [facility]) ICD-10-CM: I63.9  ICD-9-CM: 435.9  2/8/2019        Long term (current) use of anticoagulants ICD-10-CM: Z79.01  ICD-9-CM: V58.61  2/8/2019        Obesity, morbid ([facility] [address] ICD-10-CM: E66.01  ICD-9-CM: 278.01  2/4/2019        Hyperlipidemia ICD-10-CM: E78.5  ICD-9-CM: 272.4  4/24/2012        Coronary atherosclerosis of native coronary artery ICD-10-CM: I25.10  ICD-9-CM: 414.01  4/24/2012        Acute anterior wall MI [facility]) ICD-10-CM: I21.09  ICD-9-CM: 410.10  4/10/2012    Overview Addendum 4/24/2012  3:40 PM by Joselito Mcdonnell MD     4/12 100% mid lad treated with single suly. 60% om1, 80% prox rca. Current Outpatient Medications on File Prior to Visit   Medication Sig    carvediloL (COREG) 3.125 mg tablet Take 1 Tablet by mouth two (2) times a day.  spironolactone (ALDACTONE) 25 mg tablet Take 1 Tablet by mouth daily.  atorvastatin (LIPITOR) 40 mg tablet Take 1 Tablet by mouth daily.  Xarelto 20 mg tab tablet TAKE 1 TABLET BY MOUTH DAILY    sacubitriL-valsartan (Entresto) 49-51 mg tab tablet Take 1 Tablet by mouth two (2) times a day. This is an increased dose    vit A-vit D3-vit E-vit K 2,000 unit-2000 unit-1,000 mcg cap Take  by mouth.  calcium carbonate/vitamin D3 (CALCIUM 500 + D PO) Take  by mouth.  magnesium 200 mg tab Take  by mouth.  FOLIC ACID PO Take  by mouth. 400 mgs    red yeast rice extract 600 mg cap Take 600 mg by mouth now.  garlic 035 mg tab Take  by mouth.  vitamin k 100 mcg tablet Take 400 mcg by mouth daily.  coenzyme q10 10 mg cap Take  by mouth.  potassium 99 mg tablet Take 99 mg by mouth daily.     ascorbic acid, vitamin C, (Vitamin C) 500 mg tablet Take 500 mg by mouth daily.    b complex vitamins tablet Take 1 Tablet by mouth daily.  hawthorn (hawthorn berry) 500 mg cap Take 250 mg by mouth.  aspirin 81 mg chewable tablet Take 1 Tab by mouth daily. No current facility-administered medications on file prior to visit. CARDIOLOGY STUDIES TO DATE:  PHI: Chula Gil and Adolfo Henriquez  4/12 echo lvef 50% with mild anterior hypo  1/19 echo dilated lv with lvef 25-30%, lae, mild aortic stenosis  1/19 cardiac cath: LAD widely patent stent, no significant disease. LCX co-dominant, first marginal has multiple high grade lesions including at ostium. Ramus small with tight lesion, 2.0 vessel. RCA small with multiple lesions proximal and mid. LV dilated with apical akinesis and severe global hypokinesis, EF 20%, LVEDP 40-45.  05/03/19 echo, lvef 35%· . Lennie, rve with preserved rvef, mild mr    9/19 echo lvef 34%    6/21 echo lvef 20-25%, ?small apical thrombus    Chief Complaint   Patient presents with    Follow-up     HPI :  He is doing remarkably well. Initial blood pressure was elevated but it was fine on recheck. They just finished a big job working in some -20 degree [employer] and now is got some time off and they are hoping to go to [place].   No problems with his medications  CARDIAC ROS:   negative for chest pain, dyspnea, palpitations, syncope, orthopnea, paroxysmal nocturnal dyspnea, exertional chest pressure/discomfort, claudication, lower extremity edema    Family History   Problem Relation Age of Onset    Alzheimer's Disease Mother     Heart Disease Father        Past Medical History:   Diagnosis Date    CAD (coronary artery disease)     stent, MI 4-10-12    Heart failure ([facility] [address] 06/12/2021    EF 20 - 25 %    Hypertension     Received intravenous tissue plasminogen activator (tPA) in emergency department     Stroke (cerebrum) ([facility] [address] 01/2019    RT hand numbness and expressive aphasia       GENERAL ROS:  A comprehensive review of systems was negative except for that written in the HPI. Visit Vitals  BP (!) 144/100 (BP 1 Location: Right arm, BP Patient Position: Sitting, BP Cuff Size: Adult)   Pulse 75   Resp 22   Ht 6' 1\" (1.854 m)   Wt 310 lb (140.6 kg)   SpO2 96%   BMI 40.90 kg/m²       Wt Readings from Last 3 Encounters:   03/11/22 310 lb (140.6 kg)   11/18/21 294 lb (133.4 kg)   09/07/21 290 lb (131.5 kg)            BP Readings from Last 3 Encounters:   03/11/22 (!) 144/100   11/18/21 136/84   08/16/21 128/80       PHYSICAL EXAM  General appearance: alert, cooperative, no distress, appears stated age  Neurologic: Alert and oriented X 3  Neck: supple, symmetrical, trachea midline, no adenopathy, no carotid bruit and no JVD  Lungs: clear to auscultation bilaterally  Heart: regular rate and rhythm, S1, S2 normal, no murmur, click, rub or gallop  Extremities: extremities normal, atraumatic, no cyanosis or edema    Lab Results   Component Value Date/Time    Cholesterol, total 168 06/13/2021 06:53 AM    Cholesterol, total 117 07/26/2019 10:24 AM    Cholesterol, total 191 01/23/2019 08:30 AM    Cholesterol, total 180 04/11/2012 04:18 AM    HDL Cholesterol 41 06/13/2021 06:53 AM    HDL Cholesterol 43 07/26/2019 10:24 AM    HDL Cholesterol 52 01/23/2019 08:30 AM    HDL Cholesterol 36 04/11/2012 04:18 AM    LDL, calculated 100.8 (H) 06/13/2021 06:53 AM    LDL, calculated 61 07/26/2019 10:24 AM    LDL, calculated 125 (H) 01/23/2019 08:30 AM    LDL, calculated 127 (H) 04/11/2012 04:18 AM    Triglyceride 131 06/13/2021 06:53 AM    Triglyceride 66 07/26/2019 10:24 AM    Triglyceride 70 01/23/2019 08:30 AM    Triglyceride 85 04/11/2012 04:18 AM    CHOL/HDL Ratio 4.1 06/13/2021 06:53 AM    CHOL/HDL Ratio 3.7 01/23/2019 08:30 AM    CHOL/HDL Ratio 5.0 04/11/2012 04:18 AM     ASSESSMENT :      He is stable and essentially asymptomatic on a good medical regimen. We are going to go up to full dose Entresto and repeat his echo when he returns for follow-up.   current treatment plan is effective, no change in therapy  lab results and schedule of future lab studies reviewed with patient  reviewed diet, exercise and weight control    Encounter Diagnoses   Name Primary?  Atherosclerosis of native coronary artery of native heart without angina pectoris Yes    Mixed hyperlipidemia     Ischemic cardiomyopathy     Long term (current) use of anticoagulants      No orders of the defined types were placed in this encounter. Follow-up and Dispositions    · Return in about 4 months (around 7/11/2022). Jethro Perry MD  3/11/2022  Please note that this dictation was completed with [employer], the [employer] voice recognition software. Quite often unanticipated grammatical, syntax, homophones, and other interpretive errors are inadvertently transcribed by the computer software. Please disregard these errors. Please excuse any errors that have escaped final proofreading. Thank you.

## 2022-03-18 PROBLEM — E66.01 OBESITY, MORBID (HCC): Status: ACTIVE | Noted: 2019-02-04

## 2022-03-19 PROBLEM — I63.9 IMPENDING CEREBROVASCULAR ACCIDENT (HCC): Status: ACTIVE | Noted: 2019-02-08

## 2022-03-19 PROBLEM — Z79.01 LONG TERM (CURRENT) USE OF ANTICOAGULANTS: Status: ACTIVE | Noted: 2019-02-08

## 2022-03-20 PROBLEM — I63.9 ISCHEMIC STROKE (HCC): Status: ACTIVE | Noted: 2021-06-12

## 2022-04-13 DIAGNOSIS — E66.01 OBESITY, MORBID (HCC): ICD-10-CM

## 2022-04-13 DIAGNOSIS — Z79.01 LONG TERM (CURRENT) USE OF ANTICOAGULANTS: ICD-10-CM

## 2022-04-13 DIAGNOSIS — E78.2 MIXED HYPERLIPIDEMIA: ICD-10-CM

## 2022-04-13 DIAGNOSIS — I25.10 ATHEROSCLEROSIS OF NATIVE CORONARY ARTERY OF NATIVE HEART WITHOUT ANGINA PECTORIS: ICD-10-CM

## 2022-04-13 DIAGNOSIS — I63.9 ISCHEMIC STROKE (HCC): ICD-10-CM

## 2022-04-13 DIAGNOSIS — I10 HYPERTENSION, UNSPECIFIED TYPE: ICD-10-CM

## 2022-04-13 DIAGNOSIS — I25.5 ISCHEMIC CARDIOMYOPATHY: ICD-10-CM

## 2022-04-13 RX ORDER — RIVAROXABAN 20 MG/1
TABLET, FILM COATED ORAL
Qty: 30 TABLET | Refills: 5 | Status: SHIPPED | OUTPATIENT
Start: 2022-04-13

## 2022-04-13 NOTE — TELEPHONE ENCOUNTER
Requested Prescriptions     Signed Prescriptions Disp Refills    Xarelto 20 mg tab tablet 30 Tablet 5     Sig: TAKE 1 TABLET BY MOUTH EVERY DAY     Authorizing Provider: Fernando Foster     Ordering User: Shantel Lopez    Per Dr. Chris Sales verbal orders

## 2022-05-24 DIAGNOSIS — E66.01 OBESITY, MORBID (HCC): ICD-10-CM

## 2022-05-24 DIAGNOSIS — E78.2 MIXED HYPERLIPIDEMIA: ICD-10-CM

## 2022-05-24 DIAGNOSIS — I63.9 ISCHEMIC STROKE (HCC): ICD-10-CM

## 2022-05-24 DIAGNOSIS — Z79.01 LONG TERM (CURRENT) USE OF ANTICOAGULANTS: ICD-10-CM

## 2022-05-24 DIAGNOSIS — I25.5 ISCHEMIC CARDIOMYOPATHY: ICD-10-CM

## 2022-05-24 DIAGNOSIS — I25.10 ATHEROSCLEROSIS OF NATIVE CORONARY ARTERY OF NATIVE HEART WITHOUT ANGINA PECTORIS: ICD-10-CM

## 2022-05-24 DIAGNOSIS — I10 HYPERTENSION, UNSPECIFIED TYPE: ICD-10-CM

## 2022-05-25 RX ORDER — SPIRONOLACTONE 25 MG/1
TABLET ORAL
Qty: 90 TABLET | Refills: 1 | Status: SHIPPED | OUTPATIENT
Start: 2022-05-25

## 2022-05-25 RX ORDER — ATORVASTATIN CALCIUM 40 MG/1
40 TABLET, FILM COATED ORAL
Qty: 90 TABLET | Refills: 1 | Status: SHIPPED | OUTPATIENT
Start: 2022-05-25

## 2022-05-25 NOTE — TELEPHONE ENCOUNTER
Requested Prescriptions     Signed Prescriptions Disp Refills    atorvastatin (LIPITOR) 40 mg tablet 90 Tablet 1     Sig: Take 1 Tablet by mouth nightly.      Authorizing Provider: Madalyn Oconnor     Ordering User: Debbie Wilkinson spironolactone (ALDACTONE) 25 mg tablet 90 Tablet 1     Sig: TAKE 1 TABLET BY MOUTH EVERY DAY     Authorizing Provider: Madalyn Oconnor     Ordering User: Marina Mcfarland verbal orders

## 2022-06-20 ENCOUNTER — ANCILLARY PROCEDURE (OUTPATIENT)
Dept: CARDIOLOGY CLINIC | Age: 67
End: 2022-06-20

## 2022-06-20 ENCOUNTER — OFFICE VISIT (OUTPATIENT)
Dept: CARDIOLOGY CLINIC | Age: 67
End: 2022-06-20

## 2022-06-20 VITALS
HEIGHT: 73 IN | DIASTOLIC BLOOD PRESSURE: 62 MMHG | WEIGHT: 315 LBS | HEIGHT: 73 IN | SYSTOLIC BLOOD PRESSURE: 120 MMHG | OXYGEN SATURATION: 96 % | WEIGHT: 315 LBS | BODY MASS INDEX: 41.75 KG/M2 | RESPIRATION RATE: 18 BRPM | DIASTOLIC BLOOD PRESSURE: 88 MMHG | SYSTOLIC BLOOD PRESSURE: 122 MMHG | BODY MASS INDEX: 41.75 KG/M2 | HEART RATE: 66 BPM

## 2022-06-20 DIAGNOSIS — I25.5 ISCHEMIC CARDIOMYOPATHY: ICD-10-CM

## 2022-06-20 DIAGNOSIS — E66.01 OBESITY, MORBID (HCC): ICD-10-CM

## 2022-06-20 DIAGNOSIS — E78.2 MIXED HYPERLIPIDEMIA: ICD-10-CM

## 2022-06-20 DIAGNOSIS — I25.10 ATHEROSCLEROSIS OF NATIVE CORONARY ARTERY OF NATIVE HEART WITHOUT ANGINA PECTORIS: Primary | ICD-10-CM

## 2022-06-20 LAB
ECHO AO ROOT DIAM: 3.2 CM
ECHO AO ROOT INDEX: 1.22 CM/M2
ECHO LA DIAMETER INDEX: 1.98 CM/M2
ECHO LA DIAMETER: 5.2 CM
ECHO LA TO AORTIC ROOT RATIO: 1.63
ECHO LA VOL 2C: 66 ML (ref 18–58)
ECHO LA VOL 4C: 80 ML (ref 18–58)
ECHO LA VOL BP: 72 ML (ref 18–58)
ECHO LA VOL/BSA BIPLANE: 27 ML/M2 (ref 16–34)
ECHO LA VOLUME AREA LENGTH: 72 ML
ECHO LA VOLUME INDEX A2C: 25 ML/M2 (ref 16–34)
ECHO LA VOLUME INDEX A4C: 31 ML/M2 (ref 16–34)
ECHO LA VOLUME INDEX AREA LENGTH: 27 ML/M2 (ref 16–34)
ECHO LV EDV A2C: 181 ML
ECHO LV EDV A4C: 213 ML
ECHO LV EDV BP: 201 ML (ref 67–155)
ECHO LV EDV INDEX A4C: 81 ML/M2
ECHO LV EDV INDEX BP: 77 ML/M2
ECHO LV EDV NDEX A2C: 69 ML/M2
ECHO LV EJECTION FRACTION A2C: 16 %
ECHO LV EJECTION FRACTION A4C: 40 %
ECHO LV EJECTION FRACTION BIPLANE: 30 % (ref 55–100)
ECHO LV ESV A2C: 153 ML
ECHO LV ESV A4C: 128 ML
ECHO LV ESV BP: 141 ML (ref 22–58)
ECHO LV ESV INDEX A2C: 58 ML/M2
ECHO LV ESV INDEX A4C: 49 ML/M2
ECHO LV ESV INDEX BP: 54 ML/M2
ECHO LV FRACTIONAL SHORTENING: 39 % (ref 28–44)
ECHO LV INTERNAL DIMENSION DIASTOLE INDEX: 2.37 CM/M2
ECHO LV INTERNAL DIMENSION DIASTOLIC: 6.2 CM (ref 4.2–5.9)
ECHO LV INTERNAL DIMENSION SYSTOLIC INDEX: 1.45 CM/M2
ECHO LV INTERNAL DIMENSION SYSTOLIC: 3.8 CM
ECHO LV IVSD: 1.5 CM (ref 0.6–1)
ECHO LV MASS 2D: 450.2 G (ref 88–224)
ECHO LV MASS INDEX 2D: 171.8 G/M2 (ref 49–115)
ECHO LV POSTERIOR WALL DIASTOLIC: 1.5 CM (ref 0.6–1)
ECHO LV RELATIVE WALL THICKNESS RATIO: 0.48

## 2022-06-20 PROCEDURE — 1123F ACP DISCUSS/DSCN MKR DOCD: CPT | Performed by: SPECIALIST

## 2022-06-20 PROCEDURE — 93308 TTE F-UP OR LMTD: CPT | Performed by: SPECIALIST

## 2022-06-20 PROCEDURE — 99213 OFFICE O/P EST LOW 20 MIN: CPT | Performed by: SPECIALIST

## 2022-06-20 NOTE — PROGRESS NOTES
HISTORY OF PRESENT ILLNESS  Lolly Rodriguez is a 78 y.o. male     SUMMARY:   Problem List  Date Reviewed: 6/20/2022          Codes Class Noted    Ischemic stroke [facility]) ICD-10-CM: I63.9  ICD-9-CM: 434.91  6/12/2021        Impending cerebrovascular accident [facility]) ICD-10-CM: I63.9  ICD-9-CM: 435.9  2/8/2019        Long term (current) use of anticoagulants ICD-10-CM: Z79.01  ICD-9-CM: V58.61  2/8/2019        Obesity, morbid ([address] ICD-10-CM: E66.01  ICD-9-CM: 278.01  2/4/2019        Hyperlipidemia ICD-10-CM: E78.5  ICD-9-CM: 272.4  4/24/2012        Coronary atherosclerosis of native coronary artery ICD-10-CM: I25.10  ICD-9-CM: 414.01  4/24/2012        Acute anterior wall MI [facility]) ICD-10-CM: I21.09  ICD-9-CM: 410.10  4/10/2012    Overview Addendum 4/24/2012  3:40 PM by Alberto Courtney MD     4/12 100% mid lad treated with single suly. 60% om1, 80% prox rca. Current Outpatient Medications on File Prior to Visit   Medication Sig    atorvastatin (LIPITOR) 40 mg tablet Take 1 Tablet by mouth nightly.  spironolactone (ALDACTONE) 25 mg tablet TAKE 1 TABLET BY MOUTH EVERY DAY    Xarelto 20 mg tab tablet TAKE 1 TABLET BY MOUTH EVERY DAY    sacubitriL-valsartan (Entresto)  mg tablet Take 1 Tablet by mouth two (2) times a day.  carvediloL (COREG) 3.125 mg tablet Take 1 Tablet by mouth two (2) times a day.  vit A-vit D3-vit E-vit K 2,000 unit-2000 unit-1,000 mcg cap Take  by mouth.  calcium carbonate/vitamin D3 (CALCIUM 500 + D PO) Take  by mouth.  magnesium 200 mg tab Take  by mouth.  FOLIC ACID PO Take  by mouth. 400 mgs    red yeast rice extract 600 mg cap Take 600 mg by mouth now.  garlic 808 mg tab Take  by mouth.  vitamin k 100 mcg tablet Take 400 mcg by mouth daily.  coenzyme q10 10 mg cap Take  by mouth.  potassium 99 mg tablet Take 99 mg by mouth daily.  ascorbic acid, vitamin C, (Vitamin C) 500 mg tablet Take 500 mg by mouth daily.     b complex vitamins tablet Take 1 Tablet by mouth daily.  hawthorn (hawthorn berry) 500 mg cap Take 250 mg by mouth.  aspirin 81 mg chewable tablet Take 1 Tab by mouth daily. No current facility-administered medications on file prior to visit. CARDIOLOGY STUDIES TO DATE:  PHI: Angelika Chen and Jacki Trejo  4/12 echo lvef 50% with mild anterior hypo  1/19 echo dilated lv with lvef 25-30%, lae, mild aortic stenosis  1/19 cardiac cath: LAD widely patent stent, no significant disease. LCX co-dominant, first marginal has multiple high grade lesions including at ostium. Ramus small with tight lesion, 2.0 vessel. RCA small with multiple lesions proximal and mid. LV dilated with apical akinesis and severe global hypokinesis, EF 20%, LVEDP 40-45.  05/03/19 echo, lvef 35%· . Lennie, rve with preserved rvef, mild mr    9/19 echo lvef 34%    6/21 echo lvef 20-25%, ?small apical thrombus    Chief Complaint   Patient presents with    Follow-up     HPI :  He is doing fairly well with his only cardiac symptom being some generalized fatigue. In spite of that he is still working some and putting pretty big garden, four 4 by 20 foot raise beds. He recently had all his blood work done at a health fair but did not bring those results with him. He was told everything was stable. He is not having any problems with his medications. His echo today showed his ejection fraction is around 30% and we we again talked about a defibrillator and he is not interested.   CARDIAC ROS:   negative for chest pain, dyspnea, palpitations, syncope, orthopnea, paroxysmal nocturnal dyspnea, exertional chest pressure/discomfort, claudication, lower extremity edema    Family History   Problem Relation Age of Onset    Alzheimer's Disease Mother     Heart Disease Father        Past Medical History:   Diagnosis Date    CAD (coronary artery disease)     stent, MI 4-10-12    Heart failure ([facility] [address] 06/12/2021    EF 20 - 25 %    Hypertension     Received intravenous tissue plasminogen activator (tPA) in emergency department     Stroke (cerebrum) ([facility] [address] 01/2019    RT hand numbness and expressive aphasia       GENERAL ROS:  A comprehensive review of systems was negative except for that written in the HPI.     Visit Vitals  /62 (BP 1 Location: Right arm, BP Patient Position: Sitting, BP Cuff Size: Adult)   Pulse 66   Resp 18   Ht 6' 1\" (1.854 m)   Wt 315 lb (142.9 kg)   SpO2 96%   BMI 41.56 kg/m²       Wt Readings from Last 3 Encounters:   06/20/22 315 lb (142.9 kg)   06/20/22 318 lb (144.2 kg)   03/11/22 310 lb (140.6 kg)            BP Readings from Last 3 Encounters:   06/20/22 120/62   06/20/22 122/88   03/11/22 122/88       PHYSICAL EXAM  General appearance: alert, cooperative, no distress, appears stated age  Neurologic: Alert and oriented X 3  Neck: supple, symmetrical, trachea midline, no adenopathy, no carotid bruit and no JVD  Lungs: clear to auscultation bilaterally  Heart: regular rate and rhythm, S1, S2 normal, no murmur, click, rub or gallop  Extremities: extremities normal, atraumatic, no cyanosis or edema    Lab Results   Component Value Date/Time    Cholesterol, total 168 06/13/2021 06:53 AM    Cholesterol, total 117 07/26/2019 10:24 AM    Cholesterol, total 191 01/23/2019 08:30 AM    Cholesterol, total 180 04/11/2012 04:18 AM    HDL Cholesterol 41 06/13/2021 06:53 AM    HDL Cholesterol 43 07/26/2019 10:24 AM    HDL Cholesterol 52 01/23/2019 08:30 AM    HDL Cholesterol 36 04/11/2012 04:18 AM    LDL, calculated 100.8 (H) 06/13/2021 06:53 AM    LDL, calculated 61 07/26/2019 10:24 AM    LDL, calculated 125 (H) 01/23/2019 08:30 AM    LDL, calculated 127 (H) 04/11/2012 04:18 AM    Triglyceride 131 06/13/2021 06:53 AM    Triglyceride 66 07/26/2019 10:24 AM    Triglyceride 70 01/23/2019 08:30 AM    Triglyceride 85 04/11/2012 04:18 AM    CHOL/HDL Ratio 4.1 06/13/2021 06:53 AM    CHOL/HDL Ratio 3.7 01/23/2019 08:30 AM    CHOL/HDL Ratio 5.0 04/11/2012 04:18 AM     ASSESSMENT :      He is stable and without any signs of decompensation minimally symptomatic on a good medical regimen. He needs no cardiac testing at this time. He will forward us a copy of his most recent blood work. current treatment plan is effective, no change in therapy  lab results and schedule of future lab studies reviewed with patient  reviewed diet, exercise and weight control    Encounter Diagnoses   Name Primary?  Atherosclerosis of native coronary artery of native heart without angina pectoris Yes    Obesity, morbid ([address]     Mixed hyperlipidemia     Ischemic cardiomyopathy      No orders of the defined types were placed in this encounter. Follow-up and Dispositions    · Return in about 4 months (around 10/20/2022). Litzy Cabrera MD  6/20/2022  Please note that this dictation was completed with [employer], the computer voice recognition software. Quite often unanticipated grammatical, syntax, homophones, and other interpretive errors are inadvertently transcribed by the computer software. Please disregard these errors. Please excuse any errors that have escaped final proofreading. Thank you.

## 2022-09-30 DIAGNOSIS — E66.01 OBESITY, MORBID (HCC): ICD-10-CM

## 2022-09-30 DIAGNOSIS — I63.9 ISCHEMIC STROKE (HCC): ICD-10-CM

## 2022-09-30 DIAGNOSIS — Z79.01 LONG TERM (CURRENT) USE OF ANTICOAGULANTS: ICD-10-CM

## 2022-09-30 DIAGNOSIS — I10 HYPERTENSION, UNSPECIFIED TYPE: ICD-10-CM

## 2022-09-30 DIAGNOSIS — I25.10 ATHEROSCLEROSIS OF NATIVE CORONARY ARTERY OF NATIVE HEART WITHOUT ANGINA PECTORIS: ICD-10-CM

## 2022-09-30 DIAGNOSIS — E78.2 MIXED HYPERLIPIDEMIA: ICD-10-CM

## 2022-09-30 DIAGNOSIS — I25.5 ISCHEMIC CARDIOMYOPATHY: ICD-10-CM

## 2022-09-30 RX ORDER — CARVEDILOL 3.12 MG/1
3.12 TABLET ORAL 2 TIMES DAILY
Qty: 180 TABLET | Refills: 0 | Status: SHIPPED | OUTPATIENT
Start: 2022-09-30

## 2022-09-30 NOTE — TELEPHONE ENCOUNTER
Requested Prescriptions     Signed Prescriptions Disp Refills    carvediloL (COREG) 3.125 mg tablet 180 Tablet 0     Sig: Take 1 Tablet by mouth two (2) times a day.      Authorizing Provider: Glenroy Cho     Ordering User: Sg King    Per Dr. Brianne Ferrer verbal orders

## 2022-11-12 NOTE — PROGRESS NOTES
1. Have you been to the ER, urgent care clinic since your last visit? Hospitalized since your last visit? Yes When: 1/23-1/28 Where: [facility] Reason for visit: CVA 2. Have you seen or consulted any other health care providers outside of the [address] [place] [address] since your last visit? Include any pap smears or colon screening. No 
 
3) Do you have an Advance Directive on file? no 
 
Patient is accompanied by self I have received verbal consent from Moises Muhammad to discuss any/all medical information while they are present in the room. 12-Nov-2022 10:27

## 2022-12-13 RX ORDER — SACUBITRIL AND VALSARTAN 49; 51 MG/1; MG/1
TABLET, FILM COATED ORAL
Qty: 60 TABLET | Refills: 1 | OUTPATIENT
Start: 2022-12-13

## 2023-01-06 DIAGNOSIS — I25.5 ISCHEMIC CARDIOMYOPATHY: ICD-10-CM

## 2023-01-06 DIAGNOSIS — E78.2 MIXED HYPERLIPIDEMIA: ICD-10-CM

## 2023-01-06 DIAGNOSIS — I10 HYPERTENSION, UNSPECIFIED TYPE: ICD-10-CM

## 2023-01-06 DIAGNOSIS — I63.9 ISCHEMIC STROKE (HCC): ICD-10-CM

## 2023-01-06 DIAGNOSIS — E66.01 OBESITY, MORBID (HCC): ICD-10-CM

## 2023-01-06 DIAGNOSIS — I25.10 ATHEROSCLEROSIS OF NATIVE CORONARY ARTERY OF NATIVE HEART WITHOUT ANGINA PECTORIS: ICD-10-CM

## 2023-01-06 DIAGNOSIS — Z79.01 LONG TERM (CURRENT) USE OF ANTICOAGULANTS: ICD-10-CM

## 2023-01-06 RX ORDER — CARVEDILOL 3.12 MG/1
TABLET ORAL
Qty: 180 TABLET | Refills: 0 | Status: SHIPPED | OUTPATIENT
Start: 2023-01-06

## 2023-01-06 NOTE — TELEPHONE ENCOUNTER
Requested Prescriptions     Signed Prescriptions Disp Refills    carvediloL (COREG) 3.125 mg tablet 180 Tablet 0     Sig: TAKE 1 TABLET BY MOUTH TWO TIMES A DAY.      Authorizing Provider: Praveena Gaston     Ordering User: Krystian Luan    Per Dr. Elena Rodriguez verbal orders

## 2023-01-11 ENCOUNTER — OFFICE VISIT (OUTPATIENT)
Dept: CARDIOLOGY CLINIC | Age: 68
End: 2023-01-11

## 2023-01-11 VITALS
BODY MASS INDEX: 41.75 KG/M2 | HEIGHT: 73 IN | WEIGHT: 315 LBS | OXYGEN SATURATION: 97 % | SYSTOLIC BLOOD PRESSURE: 136 MMHG | DIASTOLIC BLOOD PRESSURE: 86 MMHG | HEART RATE: 70 BPM | RESPIRATION RATE: 15 BRPM

## 2023-01-11 DIAGNOSIS — I25.5 ISCHEMIC CARDIOMYOPATHY: ICD-10-CM

## 2023-01-11 DIAGNOSIS — Z79.01 LONG TERM (CURRENT) USE OF ANTICOAGULANTS: ICD-10-CM

## 2023-01-11 DIAGNOSIS — E78.2 MIXED HYPERLIPIDEMIA: ICD-10-CM

## 2023-01-11 DIAGNOSIS — I25.10 ATHEROSCLEROSIS OF NATIVE CORONARY ARTERY OF NATIVE HEART WITHOUT ANGINA PECTORIS: Primary | ICD-10-CM

## 2023-01-11 RX ORDER — SACUBITRIL AND VALSARTAN 49; 51 MG/1; MG/1
1 TABLET, FILM COATED ORAL 2 TIMES DAILY
COMMUNITY

## 2023-01-11 NOTE — PROGRESS NOTES
HISTORY OF PRESENT ILLNESS  Pippa Heaton is a 75 y.o. male     SUMMARY:   Problem List  Date Reviewed: 6/20/2022            Codes Class Noted    Ischemic stroke [facility]) ICD-10-CM: I63.9  ICD-9-CM: 434.91  6/12/2021        Impending cerebrovascular accident [facility]) ICD-10-CM: I63.9  ICD-9-CM: 435.9  2/8/2019        Long term (current) use of anticoagulants ICD-10-CM: Z79.01  ICD-9-CM: V58.61  2/8/2019        Obesity, morbid ([facility] [address] ICD-10-CM: E66.01  ICD-9-CM: 278.01  2/4/2019        Hyperlipidemia ICD-10-CM: E78.5  ICD-9-CM: 272.4  4/24/2012        Coronary atherosclerosis of native coronary artery ICD-10-CM: I25.10  ICD-9-CM: 414.01  4/24/2012        Acute anterior wall MI [facility]) ICD-10-CM: I21.09  ICD-9-CM: 410.10  4/10/2012    Overview Addendum 4/24/2012  3:40 PM by Piter Pearson MD     4/12 100% mid lad treated with single suly. 60% om1, 80% prox rca. Current Outpatient Medications on File Prior to Visit   Medication Sig    sacubitriL-valsartan (Entresto) 49-51 mg tab tablet Take 1 Tablet by mouth two (2) times a day. carvediloL (COREG) 3.125 mg tablet TAKE 1 TABLET BY MOUTH TWO TIMES A DAY. atorvastatin (LIPITOR) 40 mg tablet Take 1 Tablet by mouth nightly. spironolactone (ALDACTONE) 25 mg tablet TAKE 1 TABLET BY MOUTH EVERY DAY    Xarelto 20 mg tab tablet TAKE 1 TABLET BY MOUTH EVERY DAY    sacubitriL-valsartan (Entresto)  mg tablet Take 1 Tablet by mouth two (2) times a day. calcium carbonate/vitamin D3 (CALCIUM 500 + D PO) Take  by mouth.    magnesium 200 mg tab Take  by mouth. red yeast rice extract 600 mg cap Take 600 mg by mouth now.    garlic 432 mg tab Take  by mouth.    coenzyme q10 10 mg cap Take 3-4 Capsules by mouth daily. ascorbic acid, vitamin C, (VITAMIN C) 500 mg tablet Take 500 mg by mouth daily. b complex vitamins tablet Take 1 Tablet by mouth daily. hawthorn (hawthorn berry) 500 mg cap Take 250 mg by mouth.     aspirin 81 mg chewable tablet Take 1 Tab by mouth daily. No current facility-administered medications on file prior to visit. CARDIOLOGY STUDIES TO DATE:  PHI: Agnes Rodriguez and Paco Sosa  4/12 echo lvef 50% with mild anterior hypo  1/19 echo dilated lv with lvef 25-30%, lae, mild aortic stenosis  1/19 cardiac cath: LAD widely patent stent, no significant disease. LCX co-dominant, first marginal has multiple high grade lesions including at ostium. Ramus small with tight lesion, 2.0 vessel. RCA small with multiple lesions proximal and mid. LV dilated with apical akinesis and severe global hypokinesis, EF 20%, LVEDP 40-45.  05/03/19 echo, lvef 35%· . Lennie, rve with preserved rvef, mild mr    9/19 echo lvef 34%    6/21 echo lvef 20-25%, ?small apical thrombus    6/22 echo lvef 30%, anteroapical hypo with apical akinesis    Chief Complaint   Patient presents with    Follow-up     HPI :  He is doing well with no cardiac complaints or problems with his medications. He had COVID back in early fall and ended up spending a couple days in the hospital at [facility]. All of his blood work from that encounter looked good and he had his lipids checked at a health fair just prior to getting COVID and that was good. He is going to get me a copy of that. He is active but not exercising.   He lost a bunch of weight with COVID but put it all back on  CARDIAC ROS:   negative for chest pain, dyspnea, palpitations, syncope, orthopnea, paroxysmal nocturnal dyspnea, exertional chest pressure/discomfort, claudication, lower extremity edema    Family History   Problem Relation Age of Onset    Alzheimer's Disease Mother     Heart Disease Father        Past Medical History:   Diagnosis Date    CAD (coronary artery disease)     stent, MI 4-10-12    Heart failure ([facility] [address] 06/12/2021    EF 20 - 25 %    Hypertension     Received intravenous tissue plasminogen activator (tPA) in emergency department     Stroke (cerebrum) ([facility] [address] 01/2019    RT hand numbness and expressive aphasia       GENERAL ROS:  A comprehensive review of systems was negative except for that written in the HPI.     Visit Vitals  /86 (BP 1 Location: Right arm, BP Patient Position: Sitting, BP Cuff Size: Adult)   Pulse 70   Resp 15   Ht 6' 1\" (1.854 m)   Wt 319 lb (144.7 kg)   SpO2 97%   BMI 42.09 kg/m²       Wt Readings from Last 3 Encounters:   01/11/23 319 lb (144.7 kg)   06/20/22 315 lb (142.9 kg)   06/20/22 318 lb (144.2 kg)            BP Readings from Last 3 Encounters:   01/11/23 136/86   06/20/22 120/62   06/20/22 122/88       PHYSICAL EXAM  General appearance: alert, cooperative, no distress, appears stated age  Neurologic: Alert and oriented X 3  Neck: supple, symmetrical, trachea midline, no adenopathy, no carotid bruit, and no JVD  Lungs: clear to auscultation bilaterally  Heart: regular rate and rhythm, S1, S2 normal, no murmur, click, rub or gallop  Extremities: extremities normal, atraumatic, no cyanosis or edema    Lab Results   Component Value Date/Time    Cholesterol, total 168 06/13/2021 06:53 AM    Cholesterol, total 117 07/26/2019 10:24 AM    Cholesterol, total 191 01/23/2019 08:30 AM    Cholesterol, total 180 04/11/2012 04:18 AM    HDL Cholesterol 41 06/13/2021 06:53 AM    HDL Cholesterol 43 07/26/2019 10:24 AM    HDL Cholesterol 52 01/23/2019 08:30 AM    HDL Cholesterol 36 04/11/2012 04:18 AM    LDL, calculated 100.8 (H) 06/13/2021 06:53 AM    LDL, calculated 61 07/26/2019 10:24 AM    LDL, calculated 125 (H) 01/23/2019 08:30 AM    LDL, calculated 127 (H) 04/11/2012 04:18 AM    Triglyceride 131 06/13/2021 06:53 AM    Triglyceride 66 07/26/2019 10:24 AM    Triglyceride 70 01/23/2019 08:30 AM    Triglyceride 85 04/11/2012 04:18 AM    CHOL/HDL Ratio 4.1 06/13/2021 06:53 AM    CHOL/HDL Ratio 3.7 01/23/2019 08:30 AM    CHOL/HDL Ratio 5.0 04/11/2012 04:18 AM     ASSESSMENT :      He is stable and asymptomatic, well compensated on a good medical regimen and needs no cardiac testing at this time.  current treatment plan is effective, no change in therapy  lab results and schedule of future lab studies reviewed with patient  reviewed diet, exercise and weight control    Encounter Diagnoses   Name Primary? Atherosclerosis of native coronary artery of native heart without angina pectoris Yes    Ischemic cardiomyopathy     Mixed hyperlipidemia     Long term (current) use of anticoagulants      Orders Placed This Encounter    sacubitriL-valsartan (Entresto) 49-51 mg tab tablet       Follow-up and Dispositions    Return in about 6 months (around 7/11/2023). Faby Grant MD  1/11/2023  Please note that this dictation was completed with [employer], the [employer] voice recognition software. Quite often unanticipated grammatical, syntax, homophones, and other interpretive errors are inadvertently transcribed by the computer software. Please disregard these errors. Please excuse any errors that have escaped final proofreading. Thank you.

## 2023-01-28 DIAGNOSIS — I63.9 ISCHEMIC STROKE (HCC): ICD-10-CM

## 2023-01-28 DIAGNOSIS — Z79.01 LONG TERM (CURRENT) USE OF ANTICOAGULANTS: ICD-10-CM

## 2023-01-28 DIAGNOSIS — I25.5 ISCHEMIC CARDIOMYOPATHY: ICD-10-CM

## 2023-01-28 DIAGNOSIS — I25.10 ATHEROSCLEROSIS OF NATIVE CORONARY ARTERY OF NATIVE HEART WITHOUT ANGINA PECTORIS: ICD-10-CM

## 2023-01-28 DIAGNOSIS — I10 HYPERTENSION, UNSPECIFIED TYPE: ICD-10-CM

## 2023-01-28 DIAGNOSIS — E66.01 OBESITY, MORBID (HCC): ICD-10-CM

## 2023-01-28 DIAGNOSIS — E78.2 MIXED HYPERLIPIDEMIA: ICD-10-CM

## 2023-01-31 RX ORDER — SPIRONOLACTONE 25 MG/1
TABLET ORAL
Qty: 90 TABLET | Refills: 1 | Status: SHIPPED | OUTPATIENT
Start: 2023-01-31

## 2023-01-31 RX ORDER — ATORVASTATIN CALCIUM 40 MG/1
40 TABLET, FILM COATED ORAL
Qty: 90 TABLET | Refills: 1 | Status: SHIPPED | OUTPATIENT
Start: 2023-01-31

## 2023-01-31 NOTE — TELEPHONE ENCOUNTER
Requested Prescriptions     Signed Prescriptions Disp Refills    atorvastatin (LIPITOR) 40 mg tablet 90 Tablet 1     Sig: TAKE 1 TABLET BY MOUTH NIGHTLY     Authorizing Provider: Kathy Donato     Ordering User: Destiny GARCIA    spironolactone (ALDACTONE) 25 mg tablet 90 Tablet 1     Sig: TAKE 1 TABLET BY MOUTH EVERY DAY     Authorizing Provider: Kathy Donato     Ordering User: Giuliano Johnson   Per Dr. Judson Romero verbal orders

## 2023-02-24 DIAGNOSIS — I10 HYPERTENSION, UNSPECIFIED TYPE: ICD-10-CM

## 2023-02-24 DIAGNOSIS — Z79.01 LONG TERM (CURRENT) USE OF ANTICOAGULANTS: ICD-10-CM

## 2023-02-24 DIAGNOSIS — I25.10 ATHEROSCLEROSIS OF NATIVE CORONARY ARTERY OF NATIVE HEART WITHOUT ANGINA PECTORIS: ICD-10-CM

## 2023-02-24 DIAGNOSIS — E78.2 MIXED HYPERLIPIDEMIA: ICD-10-CM

## 2023-02-24 DIAGNOSIS — I25.5 ISCHEMIC CARDIOMYOPATHY: ICD-10-CM

## 2023-02-24 DIAGNOSIS — I63.9 ISCHEMIC STROKE (HCC): ICD-10-CM

## 2023-02-24 DIAGNOSIS — E66.01 OBESITY, MORBID (HCC): ICD-10-CM

## 2023-02-24 RX ORDER — RIVAROXABAN 20 MG/1
TABLET, FILM COATED ORAL
Qty: 60 TABLET | Refills: 5 | Status: SHIPPED | OUTPATIENT
Start: 2023-02-24

## 2023-02-24 NOTE — TELEPHONE ENCOUNTER
Requested Prescriptions     Signed Prescriptions Disp Refills    rivaroxaban (Xarelto) 20 mg tab tablet 60 Tablet 5     Sig: TAKE 1 TABLET BY MOUTH EVERY DAY     Authorizing Provider: Wilmer Edward     Ordering User: Sugar Rader    Per Dr. Juan La verbal orders

## 2023-04-20 DIAGNOSIS — I25.5 ISCHEMIC CARDIOMYOPATHY: Primary | ICD-10-CM

## 2023-04-21 RX ORDER — SACUBITRIL AND VALSARTAN 49; 51 MG/1; MG/1
TABLET, FILM COATED ORAL
Qty: 60 TABLET | Refills: 3 | Status: SHIPPED | OUTPATIENT
Start: 2023-04-21

## 2023-04-21 NOTE — TELEPHONE ENCOUNTER
Requested Prescriptions     Signed Prescriptions Disp Refills    sacubitriL-valsartan (Entresto) 49-51 mg tab tablet 60 Tablet 3     Sig: TAKE 1 TABLET BY MOUTH TWO (2) TIMES A DAY.  THIS IS AN INCREASED DOSE     Authorizing Provider: Michael Carmen     Ordering User: Maci Hutchins   Per Dr. Tigre Buckley verbal orders

## 2023-07-14 ENCOUNTER — OFFICE VISIT (OUTPATIENT)
Age: 68
End: 2023-07-14

## 2023-07-14 VITALS
OXYGEN SATURATION: 96 % | WEIGHT: 306.6 LBS | SYSTOLIC BLOOD PRESSURE: 128 MMHG | HEIGHT: 73 IN | HEART RATE: 64 BPM | BODY MASS INDEX: 40.63 KG/M2 | DIASTOLIC BLOOD PRESSURE: 84 MMHG

## 2023-07-14 DIAGNOSIS — E78.2 MIXED HYPERLIPIDEMIA: ICD-10-CM

## 2023-07-14 DIAGNOSIS — I10 ESSENTIAL (PRIMARY) HYPERTENSION: ICD-10-CM

## 2023-07-14 DIAGNOSIS — Z79.01 LONG TERM (CURRENT) USE OF ANTICOAGULANTS: ICD-10-CM

## 2023-07-14 DIAGNOSIS — I25.10 CORONARY ARTERY DISEASE INVOLVING NATIVE CORONARY ARTERY OF NATIVE HEART WITHOUT ANGINA PECTORIS: ICD-10-CM

## 2023-07-14 DIAGNOSIS — I25.5 ISCHEMIC CARDIOMYOPATHY: Primary | ICD-10-CM

## 2023-07-14 PROCEDURE — 99214 OFFICE O/P EST MOD 30 MIN: CPT | Performed by: SPECIALIST

## 2023-07-14 PROCEDURE — 1123F ACP DISCUSS/DSCN MKR DOCD: CPT | Performed by: SPECIALIST

## 2023-07-14 PROCEDURE — 3074F SYST BP LT 130 MM HG: CPT | Performed by: SPECIALIST

## 2023-07-14 PROCEDURE — 3079F DIAST BP 80-89 MM HG: CPT | Performed by: SPECIALIST

## 2023-07-14 RX ORDER — SACUBITRIL AND VALSARTAN 49; 51 MG/1; MG/1
1 TABLET, FILM COATED ORAL 2 TIMES DAILY
Qty: 180 TABLET | Refills: 3 | Status: SHIPPED | OUTPATIENT
Start: 2023-07-14

## 2023-07-14 ASSESSMENT — PATIENT HEALTH QUESTIONNAIRE - PHQ9
SUM OF ALL RESPONSES TO PHQ9 QUESTIONS 1 & 2: 0
SUM OF ALL RESPONSES TO PHQ QUESTIONS 1-9: 0
1. LITTLE INTEREST OR PLEASURE IN DOING THINGS: 0
SUM OF ALL RESPONSES TO PHQ QUESTIONS 1-9: 0
2. FEELING DOWN, DEPRESSED OR HOPELESS: 0
SUM OF ALL RESPONSES TO PHQ QUESTIONS 1-9: 0
SUM OF ALL RESPONSES TO PHQ QUESTIONS 1-9: 0

## 2023-08-21 ENCOUNTER — TELEPHONE (OUTPATIENT)
Age: 68
End: 2023-08-21

## 2023-09-20 DIAGNOSIS — E78.2 MIXED HYPERLIPIDEMIA: ICD-10-CM

## 2023-09-20 DIAGNOSIS — I25.10 ATHEROSCLEROTIC HEART DISEASE OF NATIVE CORONARY ARTERY WITHOUT ANGINA PECTORIS: ICD-10-CM

## 2023-09-21 RX ORDER — CARVEDILOL 3.12 MG/1
3.12 TABLET ORAL 2 TIMES DAILY
Qty: 180 TABLET | Refills: 1 | Status: SHIPPED | OUTPATIENT
Start: 2023-09-21

## 2023-09-21 NOTE — TELEPHONE ENCOUNTER
Requested Prescriptions     Signed Prescriptions Disp Refills    carvedilol (COREG) 3.125 MG tablet 180 tablet 1     Sig: TAKE 1 TABLET BY MOUTH TWICE A DAY     Authorizing Provider: Willow Pete     Ordering User: Pritesh White    Per Dr. Ritika Castro verbal order.

## 2023-12-07 DIAGNOSIS — I25.10 CORONARY ARTERY DISEASE INVOLVING NATIVE CORONARY ARTERY OF NATIVE HEART WITHOUT ANGINA PECTORIS: Primary | ICD-10-CM

## 2023-12-07 RX ORDER — SPIRONOLACTONE 25 MG/1
25 TABLET ORAL DAILY
Qty: 90 TABLET | Refills: 0 | Status: SHIPPED | OUTPATIENT
Start: 2023-12-07

## 2024-04-05 ENCOUNTER — OFFICE VISIT (OUTPATIENT)
Age: 69
End: 2024-04-05

## 2024-04-05 VITALS
SYSTOLIC BLOOD PRESSURE: 132 MMHG | WEIGHT: 302.2 LBS | HEIGHT: 73 IN | OXYGEN SATURATION: 98 % | BODY MASS INDEX: 40.05 KG/M2 | DIASTOLIC BLOOD PRESSURE: 74 MMHG | HEART RATE: 67 BPM

## 2024-04-05 DIAGNOSIS — I25.5 ISCHEMIC CARDIOMYOPATHY: ICD-10-CM

## 2024-04-05 DIAGNOSIS — I25.10 CORONARY ARTERY DISEASE INVOLVING NATIVE CORONARY ARTERY OF NATIVE HEART WITHOUT ANGINA PECTORIS: Primary | ICD-10-CM

## 2024-04-05 DIAGNOSIS — I10 ESSENTIAL (PRIMARY) HYPERTENSION: ICD-10-CM

## 2024-04-05 DIAGNOSIS — I25.10 ATHEROSCLEROSIS OF NATIVE CORONARY ARTERY OF NATIVE HEART WITHOUT ANGINA PECTORIS: ICD-10-CM

## 2024-04-05 PROCEDURE — 99214 OFFICE O/P EST MOD 30 MIN: CPT | Performed by: SPECIALIST

## 2024-04-05 PROCEDURE — 1123F ACP DISCUSS/DSCN MKR DOCD: CPT | Performed by: SPECIALIST

## 2024-04-05 PROCEDURE — 3075F SYST BP GE 130 - 139MM HG: CPT | Performed by: SPECIALIST

## 2024-04-05 PROCEDURE — 3078F DIAST BP <80 MM HG: CPT | Performed by: SPECIALIST

## 2024-04-05 ASSESSMENT — PATIENT HEALTH QUESTIONNAIRE - PHQ9
SUM OF ALL RESPONSES TO PHQ QUESTIONS 1-9: 0
1. LITTLE INTEREST OR PLEASURE IN DOING THINGS: NOT AT ALL
SUM OF ALL RESPONSES TO PHQ9 QUESTIONS 1 & 2: 0
SUM OF ALL RESPONSES TO PHQ QUESTIONS 1-9: 0
2. FEELING DOWN, DEPRESSED OR HOPELESS: NOT AT ALL

## 2024-04-05 NOTE — PROGRESS NOTES
HISTORY OF PRESENT ILLNESS  Lon Mcleod is a 68 y.o. male     SUMMARY:   Patient Active Problem List   Diagnosis    Obesity, morbid (HCC)    Acute anterior wall MI (HCC)    Hyperlipidemia    Impending cerebrovascular accident (HCC)    Long term (current) use of anticoagulants    Ischemic stroke (HCC)    Coronary atherosclerosis of native coronary artery            CARDIOLOGY STUDIES TO DATE:  PHI: Johana Mcleod and Sulaiman Ache  4/12 echo lvef 50% with mild anterior hypo  1/19 echo dilated lv with lvef 25-30%, lae, mild aortic stenosis  1/19 cardiac cath: LAD widely patent stent, no significant disease. LCX co-dominant, first marginal has multiple high grade lesions including at ostium. Ramus small with tight lesion, 2.0 vessel. RCA small with multiple lesions proximal and mid. LV dilated with apical akinesis and severe global hypokinesis, EF 20%, LVEDP 40-45.  05/03/19 echo, lvef 35%· . Imelda, rve with preserved rvef, mild mr     9/19 echo lvef 34%     6/21 echo lvef 20-25%, ?small apical thrombus     6/22 echo lvef 30%, anteroapical hypo with apical akinesis    Chief Complaint   Patient presents with    Hyperlipidemia    Follow-up     Ischemic cardiomyopathy       HPI :  He is doing great with no cardiac complaints or problems with his medications.  He is actually lost a couple pounds since we last met but he is still very overweight.  He recently had blood work through his PCP but we have not yet received a copy.  They spent a month in [place] around [place] time and just recently got back from about 12 days in [place].    CARDIAC ROS:   negative for chest pain, claudication, dyspnea, irregular heart beat, lower extremity edema, orthopnea, paroxysmal nocturnal dyspnea, and syncope    Family History   Problem Relation Age of Onset    Alzheimer's Disease Mother     Heart Disease Father        Past Medical History:   Diagnosis Date    CAD (coronary artery disease)     stent, MI 4-10-12    Heart failure (HCC)

## 2024-10-18 ENCOUNTER — OFFICE VISIT (OUTPATIENT)
Age: 69
End: 2024-10-18

## 2024-10-18 VITALS
WEIGHT: 296 LBS | BODY MASS INDEX: 39.23 KG/M2 | SYSTOLIC BLOOD PRESSURE: 128 MMHG | OXYGEN SATURATION: 99 % | HEART RATE: 80 BPM | DIASTOLIC BLOOD PRESSURE: 74 MMHG | HEIGHT: 73 IN

## 2024-10-18 DIAGNOSIS — I10 ESSENTIAL (PRIMARY) HYPERTENSION: ICD-10-CM

## 2024-10-18 DIAGNOSIS — I25.5 ISCHEMIC CARDIOMYOPATHY: Primary | ICD-10-CM

## 2024-10-18 DIAGNOSIS — E78.2 MIXED HYPERLIPIDEMIA: ICD-10-CM

## 2024-10-18 DIAGNOSIS — Z79.01 LONG TERM (CURRENT) USE OF ANTICOAGULANTS: ICD-10-CM

## 2024-10-18 DIAGNOSIS — I25.10 CORONARY ARTERY DISEASE INVOLVING NATIVE CORONARY ARTERY OF NATIVE HEART WITHOUT ANGINA PECTORIS: ICD-10-CM

## 2024-10-18 PROCEDURE — 1123F ACP DISCUSS/DSCN MKR DOCD: CPT | Performed by: SPECIALIST

## 2024-10-18 PROCEDURE — 99214 OFFICE O/P EST MOD 30 MIN: CPT | Performed by: SPECIALIST

## 2024-10-18 PROCEDURE — 3078F DIAST BP <80 MM HG: CPT | Performed by: SPECIALIST

## 2024-10-18 PROCEDURE — 3074F SYST BP LT 130 MM HG: CPT | Performed by: SPECIALIST

## 2024-10-18 ASSESSMENT — PATIENT HEALTH QUESTIONNAIRE - PHQ9
SUM OF ALL RESPONSES TO PHQ QUESTIONS 1-9: 0
1. LITTLE INTEREST OR PLEASURE IN DOING THINGS: NOT AT ALL
SUM OF ALL RESPONSES TO PHQ QUESTIONS 1-9: 0
SUM OF ALL RESPONSES TO PHQ QUESTIONS 1-9: 0
2. FEELING DOWN, DEPRESSED OR HOPELESS: NOT AT ALL
SUM OF ALL RESPONSES TO PHQ9 QUESTIONS 1 & 2: 0
SUM OF ALL RESPONSES TO PHQ QUESTIONS 1-9: 0

## 2024-10-18 NOTE — PROGRESS NOTES
GFRAA >60 06/14/2021    AGRATIO 1.0 (L) 06/12/2021    GLOB 3.8 06/12/2021       Current Outpatient Medications   Medication Sig Dispense Refill    ascorbic acid (VITAMIN C) 500 MG tablet Take 1 tablet by mouth daily      Coenzyme Q10 10 MG CAPS Take 3-4 capsules by mouth daily      Red Yeast Rice Extract 600 MG CAPS Take 600 mg by mouth      spironolactone (ALDACTONE) 25 MG tablet TAKE 1 TABLET BY MOUTH EVERY DAY (Patient not taking: Reported on 10/18/2024) 90 tablet 0    carvedilol (COREG) 3.125 MG tablet TAKE 1 TABLET BY MOUTH TWICE A DAY (Patient not taking: Reported on 10/18/2024) 180 tablet 1    sacubitril-valsartan (ENTRESTO) 49-51 MG per tablet Take 1 tablet by mouth 2 times daily (Patient not taking: Reported on 10/18/2024) 180 tablet 3    atorvastatin (LIPITOR) 40 MG tablet Take 1 tablet by mouth nightly (Patient not taking: Reported on 10/18/2024)      rivaroxaban (XARELTO) 20 MG TABS tablet Take 1 tablet by mouth daily (Patient not taking: Reported on 10/18/2024)       No current facility-administered medications for this visit.       ASSESSMENT & PLAN:      His blood pressure is well-controlled he is not having any worrisome symptoms and his exam is unremarkable so I not sure that all this new potions are helping him but it does not appear that they are hurting him.  We talked about the use of normal or usual guideline directed medical therapy, which I think we could combine with what he is currently on but he wants to continue on his current path.  He needs no specific cardiac testing at this time.  He is always refused the idea of a defibrillator.    Current treatment plan is effective,reviewed diet, exercise and weight control     1. Ischemic cardiomyopathy  2. Coronary artery disease involving native coronary artery of native heart without angina pectoris  3. Essential (primary) hypertension  4. Mixed hyperlipidemia  5. Long term (current) use of anticoagulants       No future appointments.

## 2025-05-07 ENCOUNTER — OFFICE VISIT (OUTPATIENT)
Age: 70
End: 2025-05-07

## 2025-05-07 VITALS
DIASTOLIC BLOOD PRESSURE: 80 MMHG | WEIGHT: 289.7 LBS | BODY MASS INDEX: 38.4 KG/M2 | HEART RATE: 68 BPM | HEIGHT: 73 IN | SYSTOLIC BLOOD PRESSURE: 140 MMHG | OXYGEN SATURATION: 96 %

## 2025-05-07 DIAGNOSIS — E78.2 MIXED HYPERLIPIDEMIA: ICD-10-CM

## 2025-05-07 DIAGNOSIS — I10 ESSENTIAL (PRIMARY) HYPERTENSION: ICD-10-CM

## 2025-05-07 DIAGNOSIS — I25.5 ISCHEMIC CARDIOMYOPATHY: ICD-10-CM

## 2025-05-07 DIAGNOSIS — I25.10 CORONARY ARTERY DISEASE INVOLVING NATIVE CORONARY ARTERY OF NATIVE HEART WITHOUT ANGINA PECTORIS: Primary | ICD-10-CM

## 2025-05-07 PROCEDURE — 99213 OFFICE O/P EST LOW 20 MIN: CPT | Performed by: SPECIALIST

## 2025-05-07 PROCEDURE — 3079F DIAST BP 80-89 MM HG: CPT | Performed by: SPECIALIST

## 2025-05-07 PROCEDURE — 3077F SYST BP >= 140 MM HG: CPT | Performed by: SPECIALIST

## 2025-05-07 PROCEDURE — 1123F ACP DISCUSS/DSCN MKR DOCD: CPT | Performed by: SPECIALIST

## 2025-05-07 ASSESSMENT — PATIENT HEALTH QUESTIONNAIRE - PHQ9
2. FEELING DOWN, DEPRESSED OR HOPELESS: NOT AT ALL
SUM OF ALL RESPONSES TO PHQ QUESTIONS 1-9: 0
1. LITTLE INTEREST OR PLEASURE IN DOING THINGS: NOT AT ALL
SUM OF ALL RESPONSES TO PHQ QUESTIONS 1-9: 0

## 2025-05-07 NOTE — PROGRESS NOTES
HISTORY OF PRESENT ILLNESS  Lon Mcleod is a 69 y.o. male     SUMMARY:   Patient Active Problem List   Diagnosis    Obesity, morbid (HCC)    Acute anterior wall MI (HCC)    Hyperlipidemia    Impending cerebrovascular accident (HCC)    Long term (current) use of anticoagulants    Ischemic stroke (HCC)    Coronary atherosclerosis of native coronary artery            CARDIOLOGY STUDIES TO DATE:  PHI: Johana Mcleod and Sulaiman Ache  4/12 echo lvef 50% with mild anterior hypo  1/19 echo dilated lv with lvef 25-30%, lae, mild aortic stenosis  1/19 cardiac cath: LAD widely patent stent, no significant disease. LCX co-dominant, first marginal has multiple high grade lesions including at ostium. Ramus small with tight lesion, 2.0 vessel. RCA small with multiple lesions proximal and mid. LV dilated with apical akinesis and severe global hypokinesis, EF 20%, LVEDP 40-45.  05/03/19 echo, lvef 35%· . Imelda, rve with preserved rvef, mild mr     9/19 echo lvef 34%     6/21 echo lvef 20-25%, ?small apical thrombus     6/22 echo lvef 30%, anteroapical hypo with apical akinesis    Chief Complaint   Patient presents with    Cardiomyopathy       HPI :  He continues to do remarkably well.  He did get some blood work recently but he has not sent us those results and he still not on any traditional medicines rather relies on various herbs and concoctions that his wife who is naturopath provides him.  He is active but not exercising and is actually lost quite a bit of weight deliberately since we last met.  Blood pressure is well-controlled and he is having no cardiac type symptoms.    CARDIAC ROS:   negative for chest pain, claudication, dyspnea, irregular heart beat, lower extremity edema, orthopnea, paroxysmal nocturnal dyspnea, and syncope    Family History   Problem Relation Age of Onset    Alzheimer's Disease Mother     Heart Disease Father        Past Medical History:   Diagnosis Date    CAD (coronary artery disease)     stent,